# Patient Record
Sex: FEMALE | Race: BLACK OR AFRICAN AMERICAN | Employment: FULL TIME | URBAN - METROPOLITAN AREA
[De-identification: names, ages, dates, MRNs, and addresses within clinical notes are randomized per-mention and may not be internally consistent; named-entity substitution may affect disease eponyms.]

---

## 2017-01-24 PROBLEM — Z12.11 ENCOUNTER FOR HEMOCCULT SCREENING: Status: ACTIVE | Noted: 2017-01-24

## 2017-03-31 RX ORDER — PAROXETINE 10 MG/1
10 TABLET, FILM COATED ORAL DAILY
Qty: 30 TAB | Refills: 3 | Status: SHIPPED | OUTPATIENT
Start: 2017-03-31 | End: 2017-04-28 | Stop reason: SDUPTHER

## 2017-04-28 DIAGNOSIS — N95.1 HOT FLASH, MENOPAUSAL: Primary | ICD-10-CM

## 2017-04-28 RX ORDER — PAROXETINE 10 MG/1
10 TABLET, FILM COATED ORAL DAILY
Qty: 30 TAB | Refills: 6 | Status: SHIPPED | OUTPATIENT
Start: 2017-04-28 | End: 2017-10-30 | Stop reason: SDUPTHER

## 2017-06-30 ENCOUNTER — OFFICE VISIT (OUTPATIENT)
Dept: INTERNAL MEDICINE CLINIC | Age: 57
End: 2017-06-30

## 2017-06-30 VITALS
DIASTOLIC BLOOD PRESSURE: 82 MMHG | HEART RATE: 74 BPM | TEMPERATURE: 96.8 F | OXYGEN SATURATION: 98 % | WEIGHT: 226 LBS | HEIGHT: 66 IN | SYSTOLIC BLOOD PRESSURE: 130 MMHG | BODY MASS INDEX: 36.32 KG/M2 | RESPIRATION RATE: 17 BRPM

## 2017-06-30 DIAGNOSIS — N95.1 HOT FLASH, MENOPAUSAL: ICD-10-CM

## 2017-06-30 DIAGNOSIS — E66.01 MORBID OBESITY DUE TO EXCESS CALORIES (HCC): ICD-10-CM

## 2017-06-30 DIAGNOSIS — I10 ESSENTIAL HYPERTENSION: Primary | ICD-10-CM

## 2017-06-30 NOTE — MR AVS SNAPSHOT
Visit Information Date & Time Provider Department Dept. Phone Encounter #  
 6/30/2017  9:15 AM Kacie Tena [facility] [phone] [id number] Follow-up Instructions Return in about 6 months (around 12/30/2017). Follow-up and Disposition History Your Appointments 12/29/2017  9:15 AM  
Any with Neda Manriquez MD  
[address] [place] [address] and Primary Care [facility]) Appt Note: follow up 6mnths  
 BladeSiobhan Lui [address] 1 [facility] [place]  
  
   
 Oscar Lui 90 [id number] Upcoming Health Maintenance Date Due DTaP/Tdap/Td series (1 - Tdap) 6/1/1982 PAP AKA CERVICAL CYTOLOGY 6/1/1982 BREAST CANCER SCRN MAMMOGRAM 6/1/2011 INFLUENZA AGE 9 TO ADULT 8/1/2017 FOBT Q 1 YEAR AGE 50-75 1/15/2018 Allergies as of 6/30/2017  Review Complete On: 6/30/2017 By: Neda Manriquez MD  
 No Known Allergies Current Immunizations  Never Reviewed No immunizations on file. Not reviewed this visit You Were Diagnosed With   
  
 Codes Comments Essential hypertension    -  Primary ICD-10-CM: I10 
ICD-9-CM: 401.9 Morbid obesity due to excess calories (HCC)     ICD-10-CM: E66.01 
ICD-9-CM: 278.01 Hot flash, menopausal     ICD-10-CM: N95.1 ICD-9-CM: 627.2 Vitals BP Pulse Temp Resp Height(growth percentile) Weight(growth percentile) 130/82 (BP 1 Location: Right arm, BP Patient Position: Sitting) 74 96.8 °F (36 °C) (Oral) 17 5' 6\" (1.676 m) 226 lb (102.5 kg) SpO2 BMI OB Status Smoking Status 98% 36.48 kg/m2 Unknown Never Smoker BMI and BSA Data Body Mass Index Body Surface Area  
 36.48 kg/m 2 2.18 m 2 Preferred Pharmacy Pharmacy Name Phone [id number] [place], [place] - [address] [phone] Your Updated Medication List  
  
   
 This list is accurate as of: 17 10:37 AM.  Always use your most recent med list. amLODIPine 10 mg tablet Commonly known as:  Guy Goyo Take 0.5 Tabs by mouth daily. Diphth, Pertus(Acell), Tetanus 2.5-8-5 Lf-mcg-Lf/0.5mL Syrg Commonly known as:  BOOSTRIX TDAP  
0.5 mL by IntraMUSCular route once for 1 dose.  
  
 hydroCHLOROthiazide 12.5 mg tablet Commonly known as:  HYDRODIURIL Take 1 Tab by mouth daily. PARoxetine 10 mg tablet Commonly known as:  PAXIL Take 1 Tab by mouth daily. Prescriptions Sent to Pharmacy Refills Shirley Flalyce,, Tetanus (BOOSTRIX TDAP) 2.5-8-5 Lf-mcg-Lf/0.5mL syrg 0 Si.5 mL by IntraMUSCular route once for 1 dose. Class: Normal  
 Pharmacy: [facility] [id number] Ph #: [phone] Route: IntraMUSCular Follow-up Instructions Return in about 6 months (around 2017). To-Do List   
 2017 Imaging:  AC MAMMO BI SCREENING INCL CAD Introducing [facility]! Jazmin Titus introduces Med[employer] patient portal. Now you can access parts of your medical record, email your doctor's office, and request medication refills online. 1. In your internet browser, go to [email or web address] 2. Click on the First Time User? Click Here link in the Sign In box. You will see the New Member Sign Up page. 3. Enter your Med[employer] Access Code exactly as it appears below. You will not need to use this code after youve completed the sign-up process. If you do not sign up before the expiration date, you must request a new code. · Med[employer] Access Code: [email or web address] Expires: 2017 10:37 AM 
 
4. Enter the last four digits of your Social Security Number (xxxx) and Date of Birth (mm/dd/yyyy) as indicated and click Submit. You will be taken to the next sign-up page. 5. Create a [employer] ID. This will be your [employer] login ID and cannot be changed, so think of one that is secure and easy to remember. 6. Create a [employer] password. You can change your password at any time. 7. Enter your Password Reset Question and Answer. This can be used at a later time if you forget your password. 8. Enter your e-mail address. You will receive e-mail notification when new information is available in [address] 9. Click Sign Up. You can now view and download portions of your medical record. 10. Click the Download Summary menu link to download a portable copy of your medical information. If you have questions, please visit the Frequently Asked Questions section of the [employer] website. Remember, [employer] is NOT to be used for urgent needs. For medical emergencies, dial 911. Now available from your iPhone and Android! Please provide this summary of care documentation to your next provider. If you have any questions after today's visit, please call [phone].

## 2017-06-30 NOTE — PROGRESS NOTES
1. Have you been to the ER, urgent care clinic since your last visit? Hospitalized since your last visit? No    2. Have you seen or consulted any other health care providers outside of the [address] [place] [address] since your last visit? Include any pap smears or colon screening.  No

## 2017-06-30 NOTE — PROGRESS NOTES
SPORTS MEDICINE AND PRIMARY CARE  Rian Wu MD, [address] [address]  Phone:  [phone]  Fax: [phone]      Chief Complaint   Patient presents with    Follow-up     6 month visit          SUBECTIVE:    Alexander Arriaza is a 65 y.o. female The patient returns today alert, appropriate, ambulatory and has the capacity to give an accurate history. She has a known history of primary hypertension, obesity and degenerative joint disease. The patient returns today voicing no new complaints and tells me she is doing okay and is seen for evaluation. Current Outpatient Prescriptions   Medication Sig Dispense Refill    Diphth, Pertus,Acell,, Tetanus (BOOSTRIX TDAP) 2.5-8-5 Lf-mcg-Lf/0.5mL syrg 0.5 mL by IntraMUSCular route once for 1 dose. 0.5 mL 0    PARoxetine (PAXIL) 10 mg tablet Take 1 Tab by mouth daily. 30 Tab 6    hydroCHLOROthiazide (HYDRODIURIL) 12.5 mg tablet Take 1 Tab by mouth daily. 90 Tab 11    amLODIPine (NORVASC) 10 mg tablet Take 0.5 Tabs by mouth daily. 80 Tab 11     Past Medical History:   Diagnosis Date    Arthritis     Encounter for Hemoccult screening 01/24/2017    neg    Hot flash, menopausal 2015    Hypertension 2000    Obesity 2000    Prediabetes 12/29/17    [place]     Past Surgical History:   Procedure Laterality Date    HX GYN      HX ORTHOPAEDIC       No Known Allergies    REVIEW OF SYSTEMS:   No chest pain, no shortness of breath. Social History     Social History    Marital status:      Spouse name: N/A    Number of children: N/A    Years of education: N/A     Social History Main Topics    Smoking status: Never Smoker    Smokeless tobacco: Never Used    Alcohol use No    Drug use: None    Sexual activity: Not Asked     Other Topics Concern    None     Social History Narrative    Habits: There is no history of smoking, alcohol abuse or drug abuse.           Social History:   The patient is  and lives with her . The patient is the mother of 29 25and 39year-old children and three grandchildren. 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 Her Taoist background is Buddhism.          Family History:  Father  at 79 of a sickness. Mother  of complications of alcoholism. She has eight siblings. r  No family history on file. OBJECTIVE:  Visit Vitals    /82 (BP 1 Location: Right arm, BP Patient Position: Sitting)    Pulse 74    Temp 96.8 °F (36 °C) (Oral)    Resp 17    Ht 5' 6\" (1.676 m)    Wt 226 lb (102.5 kg)    SpO2 98%    BMI 36.48 kg/m2     ENT: perrla,  eom intact  NECK: supple. Thyroid normal  CHEST: clear to ascultation and percussion   HEART: regular rate and rhythm  ABD: soft, bowel sounds active  EXTREMITIES: no edema, pulse 1+     No visits with results within 3 Month(s) from this visit. Latest known visit with results is:    Office Visit on 2016   Component Date Value Ref Range Status    Specific Gravity 2016 1.013  1.005 - 1.030 Final    pH (UA) 2016 6.5  5.0 - 7.5 Final    Color 2016 Yellow  Yellow Final    Appearance 2016 Clear  Clear Final    Leukocyte Esterase 2016 Negative  Negative Final    Protein 2016 Negative  Negative/Trace Final    Glucose 2016 Negative  Negative Final    Ketone 2016 Negative  Negative Final    Blood 2016 Negative  Negative Final    Bilirubin 2016 Negative  Negative Final    Urobilinogen 2016 0.2  0.2 - 1.0 mg/dL Final    Nitrites 2016 Negative  Negative Final    Microscopic Examination 2016 Comment   Final    Microscopic not indicated and not performed.     WBC 2016 4.7  3.4 - 10.8 x10E3/uL Final    RBC 2016 5.22  3.77 - 5.28 x10E6/uL Final    HGB 2016 13.5  11.1 - 15.9 g/dL Final    HCT 2016 41.9  34.0 - 46.6 % Final    MCV 2016 80  79 - 97 fL Final    MCH 12/30/2016 25.9* 26.6 - 33.0 pg Final    MCHC 12/30/2016 32.2  31.5 - 35.7 g/dL Final    RDW 12/30/2016 14.8  12.3 - 15.4 % Final    PLATELET 09/56/0980 908  150 - 379 x10E3/uL Final    NEUTROPHILS 12/30/2016 44  % Final    Lymphocytes 12/30/2016 41  % Final    MONOCYTES 12/30/2016 9  % Final    EOSINOPHILS 12/30/2016 5  % Final    BASOPHILS 12/30/2016 1  % Final    ABS. NEUTROPHILS 12/30/2016 2.1  1.4 - 7.0 x10E3/uL Final    Abs Lymphocytes 12/30/2016 1.9  0.7 - 3.1 x10E3/uL Final    ABS. MONOCYTES 12/30/2016 0.4  0.1 - 0.9 x10E3/uL Final    ABS. EOSINOPHILS 12/30/2016 0.2  0.0 - 0.4 x10E3/uL Final    ABS. BASOPHILS 12/30/2016 0.0  0.0 - 0.2 x10E3/uL Final    IMMATURE GRANULOCYTES 12/30/2016 0  % Final    ABS. IMM. GRANS. 12/30/2016 0.0  0.0 - 0.1 x10E3/uL Final    Glucose 12/30/2016 79  65 - 99 mg/dL Final    BUN 12/30/2016 13  6 - 24 mg/dL Final    Creatinine 12/30/2016 0.81  0.57 - 1.00 mg/dL Final    GFR est non-AA 12/30/2016 82  >59 mL/min/1.73 Final    GFR est AA 12/30/2016 95  >59 mL/min/1.73 Final    BUN/Creatinine ratio 12/30/2016 16  9 - 23 Final    Sodium 12/30/2016 144  134 - 144 mmol/L Final    Potassium 12/30/2016 4.4  3.5 - 5.2 mmol/L Final    Chloride 12/30/2016 104  96 - 106 mmol/L Final    CO2 12/30/2016 24  18 - 29 mmol/L Final    Calcium 12/30/2016 9.9  8.7 - 10.2 mg/dL Final    Protein, total 12/30/2016 7.4  6.0 - 8.5 g/dL Final    Albumin 12/30/2016 4.4  3.5 - 5.5 g/dL Final    GLOBULIN, TOTAL 12/30/2016 3.0  1.5 - 4.5 g/dL Final    A-G Ratio 12/30/2016 1.5  1.1 - 2.5 Final    Bilirubin, total 12/30/2016 0.3  0.0 - 1.2 mg/dL Final    Alk.  phosphatase 12/30/2016 61  39 - 117 IU/L Final    AST (SGOT) 12/30/2016 11  0 - 40 IU/L Final    ALT (SGPT) 12/30/2016 9  0 - 32 IU/L Final    Cholesterol, total 12/30/2016 152  100 - 199 mg/dL Final    Triglyceride 12/30/2016 83  0 - 149 mg/dL Final    HDL Cholesterol 12/30/2016 60  >39 mg/dL Final    VLDL, calculated 12/30/2016 17  5 - 40 mg/dL Final    LDL, calculated 12/30/2016 75  0 - 99 mg/dL Final    TSH 12/30/2016 3.940  0.450 - 4.500 uIU/mL Final    Hemoglobin A1c 12/30/2016 5.9* 4.8 - 5.6 % Final    Comment:          Pre-diabetes: 5.7 - 6.4           Diabetes: >6.4           Glycemic control for adults with diabetes: <7.0      Estimated average glucose 12/30/2016 123  mg/dL Final    Hepatitis A Ab, IgM 12/30/2016 Negative  Negative Final    Hep B surface Ag screen 12/30/2016 Negative  Negative Final    Hep B Core Ab, IgM 12/30/2016 Negative  Negative Final    Hep C Virus Ab 12/30/2016 0.1  0.0 - 0.9 s/co ratio Final    Comment:                                   Negative:     < 0.8                               Indeterminate: 0.8 - 0.9                                    Positive:     > 0.9   The CDC recommends that a positive HCV antibody result   be followed up with a HCV Nucleic Acid Amplification   test (796856).  Occult Blood fecal, by [place] 01/15/2017 Negative  Negative Final          ASSESSMENT:  1. Essential hypertension    2. Morbid obesity due to excess calories ([address]    3. Hot flash, menopausal      The patient's blood pressure control is excellent and it is at goal.      She has a considerable amount of stress. Her  is in the hospital in [place] with complications of congestive heart failure. He had been seen by us and referred to a cardiologist.  Unfortunately, his condition deteriorated. She is concerned about his health. We encouraged diuretic with potassium supplement and orange juice and banana, which she will start doing. She will be due for her laboratory studies in 12/2017. We will see her at that time unless she has a problem before then and we could see her sooner. On her last set of laboratory studies, she was found to have prediabetes. We encouraged physical activity for 30 minutes five days a week and a heart healthy reducing diet. PLAN:  .  Orders Placed This Encounter    AC MAMMO BI SCREENING INCL CAD    Diphth, [address] Tetanus ([address] TDAP) 2.5-8-5 Lf-mcg-Lf/0.5mL syrg       Follow-up Disposition:  Return in about 6 months (around 12/30/2017). ATTENTION:   This medical record was transcribed using an electronic medical records system. Although proofread, it may and can contain electronic and spelling errors. Other human spelling and other errors may be present. Corrections may be executed at a later time. Please feel free to contact us for any clarifications as needed.

## 2017-07-08 ENCOUNTER — HOSPITAL ENCOUNTER (OUTPATIENT)
Dept: MAMMOGRAPHY | Age: 57
Discharge: HOME | End: 2017-07-08
Attending: INTERNAL MEDICINE
Payer: COMMERCIAL

## 2017-07-08 DIAGNOSIS — Z12.31 VISIT FOR SCREENING MAMMOGRAM: ICD-10-CM

## 2017-07-08 PROCEDURE — 77067 SCR MAMMO BI INCL CAD: CPT

## 2017-10-30 RX ORDER — PAROXETINE 10 MG/1
TABLET, FILM COATED ORAL
Qty: 30 TAB | Refills: 3 | Status: SHIPPED | OUTPATIENT
Start: 2017-10-30 | End: 2017-12-29 | Stop reason: SDUPTHER

## 2017-12-29 ENCOUNTER — OFFICE VISIT (OUTPATIENT)
Dept: INTERNAL MEDICINE CLINIC | Age: 57
End: 2017-12-29

## 2017-12-29 VITALS
DIASTOLIC BLOOD PRESSURE: 80 MMHG | WEIGHT: 234.1 LBS | RESPIRATION RATE: 18 BRPM | SYSTOLIC BLOOD PRESSURE: 127 MMHG | HEIGHT: 66 IN | TEMPERATURE: 97.8 F | OXYGEN SATURATION: 97 % | BODY MASS INDEX: 37.62 KG/M2 | HEART RATE: 83 BPM

## 2017-12-29 DIAGNOSIS — R73.03 PREDIABETES: ICD-10-CM

## 2017-12-29 DIAGNOSIS — Z23 ENCOUNTER FOR IMMUNIZATION: Primary | ICD-10-CM

## 2017-12-29 DIAGNOSIS — E04.1 THYROID NODULE: ICD-10-CM

## 2017-12-29 DIAGNOSIS — N95.1 HOT FLASH, MENOPAUSAL: ICD-10-CM

## 2017-12-29 DIAGNOSIS — E66.09 CLASS 2 OBESITY DUE TO EXCESS CALORIES WITHOUT SERIOUS COMORBIDITY WITH BODY MASS INDEX (BMI) OF 37.0 TO 37.9 IN ADULT: ICD-10-CM

## 2017-12-29 DIAGNOSIS — I10 ESSENTIAL HYPERTENSION: ICD-10-CM

## 2017-12-29 RX ORDER — PAROXETINE HYDROCHLORIDE 20 MG/1
20 TABLET, FILM COATED ORAL DAILY
Qty: 30 TAB | Refills: 11 | Status: SHIPPED | OUTPATIENT
Start: 2017-12-29 | End: 2018-03-30 | Stop reason: SDUPTHER

## 2017-12-29 NOTE — PROGRESS NOTES
The Paxil is no longer effective for her hot flashes. Around September she started having hot flashes more than previously noted. She wonders what else can be done. We suggest increasing Paxil to 20 mg daily to see if that helps and also suggest soy silk and black cohosh. We are very disappointed in her scales today. She obviously has had a wonderful Thanksgiving and [place]etu with a 12 pound weight gain since we last saw her. She tells me she is going to do better, so after the first of the year encouraged physical activity for 30 minutes five days a week and a heart healthy, weight reducing diet. Appropriate laboratory studies have been requested and will be sent to her in the mail. Six months from now we expect to see a significant improvement in her weight.

## 2017-12-29 NOTE — PROGRESS NOTES
SPORTS MEDICINE AND PRIMARY CARE  Oskar Rivera MD, [address] [address]  Phone:  [phone]  Fax: [phone]       Chief Complaint   Patient presents with    Hypertension     6 month f/u   . SUBJECTIVE:    Rohit Mendieta is a 65 y.o. female  Dictation on: 12/29/2017 10:28 AM by: Zeke Flores [[id number]]            Current Outpatient Prescriptions   Medication Sig Dispense Refill    PARoxetine (PAXIL) 20 mg tablet Take 1 Tab by mouth daily. 30 Tab 11    black cohosh 20 mg tab Take 40 mg by mouth daily. 30 Tab 11    hydroCHLOROthiazide (HYDRODIURIL) 12.5 mg tablet Take 1 Tab by mouth daily. 90 Tab 11    amLODIPine (NORVASC) 10 mg tablet Take 0.5 Tabs by mouth daily.  80 Tab 11     Past Medical History:   Diagnosis Date    Arthritis     Encounter for Hemoccult screening 01/24/2017    neg    Hot flash, menopausal 2015    Hypertension 2000    Obesity 2000    Prediabetes 12/29/17    [place]     Past Surgical History:   Procedure Laterality Date    HX GYN      HX ORTHOPAEDIC       No Known Allergies      REVIEW OF SYSTEMS:  General: negative for - chills or fever  ENT: negative for - headaches, nasal congestion or tinnitus  Respiratory: negative for - cough, hemoptysis, shortness of breath or wheezing  Cardiovascular : negative for - chest pain, edema, palpitations or shortness of breath  Gastrointestinal: negative for - abdominal pain, blood in stools, heartburn or nausea/vomiting  Genito-Urinary: no dysuria, trouble voiding, or hematuria  Musculoskeletal: negative for - gait disturbance, joint pain, joint stiffness or joint swelling  Neurological: no TIA or stroke symptoms  Hematologic: no bruises, no bleeding, no swollen glands  Integument: no lumps, mole changes, nail changes or rash  Endocrine: no malaise/lethargy or unexpected weight changes      Social History     Social History    Marital status:      Spouse name: N/A    Number of children: N/A    Years of education: N/A     Social History Main Topics    Smoking status: Never Smoker    Smokeless tobacco: Never Used    Alcohol use No    Drug use: No    Sexual activity: Yes     Partners: Male     Other Topics Concern    None     Social History Narrative    Habits: There is no history of smoking, alcohol abuse or drug abuse.           Social History:  The patient is  and lives with her . The patient is the mother of 29 25and 39year-old children and three grandchildren. 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 Her Alevism background is Hindu.          Family History:  Father  at 79 of a sickness. Mother  of complications of alcoholism. She has eight siblings. History reviewed. No pertinent family history. OBJECTIVE:    Visit Vitals    /80    Pulse 83    Temp 97.8 °F (36.6 °C) (Oral)    Resp 18    Ht 5' 6\" (1.676 m)    Wt 234 lb 1.6 oz (106.2 kg)    SpO2 97%    BMI 37.78 kg/m2     CONSTITUTIONAL: well , well nourished, appears age appropriate  EYES: perrla, eom intact  ENMT:moist mucous membranes, pharynx clear  NECK: supple. Thyroid normal  RESPIRATORY: Chest: clear bilaterally   CARDIOVASCULAR: Heart: regular rate and rhythm  GASTROINTESTINAL: Abdomen: soft, bowel sounds active  HEMATOLOGIC: no pathological lymph nodes palpated  MUSCULOSKELETAL: Extremities: no edema, pulse 1+   INTEGUMENT: No unusual rashes or suspicious skin lesions noted. Nails appear normal.  NEUROLOGIC: non-focal exam   MENTAL STATUS: alert and oriented, appropriate affect           ASSESSMENT:  1. Encounter for immunization    2. Essential hypertension    3. Prediabetes    4. Class 2 obesity due to excess calories without serious comorbidity with body mass index (BMI) of 37.0 to 37.9 in adult    5.  Hot flash, menopausal       Dictation on: 2017 10:30 AM by: Leesa Ceballos [[id number]] PLAN:  .  Orders Placed This Encounter    Influenza virus vaccine (QUADRIVALENT PRES FREE SYRINGE) IM ([id number])    URINALYSIS W/ RFLX MICROSCOPIC    CBC WITH AUTOMATED DIFF    METABOLIC PANEL, COMPREHENSIVE    LIPID PANEL    TSH 3RD GENERATION    HEMOGLOBIN A1C WITH EAG    PARoxetine (PAXIL) 20 mg tablet    black cohosh 20 mg tab       Follow-up Disposition:  Return in about 6 months (around 6/29/2018). ATTENTION:   This medical record was transcribed using an electronic medical records system. Although proofread, it may and can contain electronic and spelling errors. Other human spelling and other errors may be present. Corrections may be executed at a later time. Please feel free to contact us for any clarifications as needed.

## 2017-12-29 NOTE — PROGRESS NOTES
1. Have you been to the ER, urgent care clinic since your last visit? Hospitalized since your last visit? No    2. Have you seen or consulted any other health care providers outside of the [address] [place] [address] since your last visit? Include any pap smears or colon screening.  No

## 2017-12-29 NOTE — PROGRESS NOTES
Patient with history of hypertension, obesity, degenerative joint disease, and is seen for evaluation ***. Patient returns today voicing no new complaints. Her only concern is that related to her knees. Patient is seen for evaluation.

## 2017-12-29 NOTE — MR AVS SNAPSHOT
Visit Information Date & Time Provider Department Dept. Phone Encounter #  
 12/29/2017  9:15 AM Kacie Patel [facility] [phone] [id number] Follow-up Instructions Return in about 6 months (around 6/29/2018). Follow-up and Disposition History Upcoming Health Maintenance Date Due  
 PAP AKA CERVICAL CYTOLOGY 6/1/1982 Influenza Age 5 to Adult 8/1/2017 FOBT Q 1 YEAR AGE 50-75 1/15/2018 DTaP/Tdap/Td series (2 - Td) 12/29/2027 Allergies as of 12/29/2017  Review Complete On: 12/29/2017 By: Lia Vazquez MD  
 No Known Allergies Current Immunizations  Never Reviewed Name Date Influenza Vaccine (Quad) PF 12/29/2017 Not reviewed this visit You Were Diagnosed With   
  
 Codes Comments Encounter for immunization    -  Primary ICD-10-CM: Z93 ICD-9-CM: V03.89 Essential hypertension     ICD-10-CM: I10 
ICD-9-CM: 401.9 Prediabetes     ICD-10-CM: R73.03 
ICD-9-CM: 790.29 Class 2 obesity due to excess calories without serious comorbidity with body mass index (BMI) of 37.0 to 37.9 in adult     ICD-10-CM: E66.09, Q44.57 ICD-9-CM: 278.00, V85.37 Hot flash, menopausal     ICD-10-CM: N95.1 ICD-9-CM: 627.2 Vitals BP Pulse Temp Resp Height(growth percentile) Weight(growth percentile) 127/80 83 97.8 °F (36.6 °C) (Oral) 18 5' 6\" (1.676 m) 234 lb 1.6 oz (106.2 kg) SpO2 BMI OB Status Smoking Status 97% 37.78 kg/m2 Menopause Never Smoker Vitals History BMI and BSA Data Body Mass Index Body Surface Area 37.78 kg/m 2 2.22 m 2 Preferred Pharmacy Pharmacy Name Phone [id number], [place] - [address] [phone] Your Updated Medication List  
  
   
This list is accurate as of: 12/29/17 10:50 AM.  Always use your most recent med list. amLODIPine 10 mg tablet Commonly known as:  Rivero Fanti Take 0.5 Tabs by mouth daily. black cohosh 20 mg Tab Take 40 mg by mouth daily. hydroCHLOROthiazide 12.5 mg tablet Commonly known as:  HYDRODIURIL Take 1 Tab by mouth daily. PARoxetine 20 mg tablet Commonly known as:  PAXIL Take 1 Tab by mouth daily. Prescriptions Sent to Pharmacy Refills PARoxetine (PAXIL) 20 mg tablet 11 Sig: Take 1 Tab by mouth daily. Class: Normal  
 Pharmacy: [facility] [address] [place] [address] [address] Ph #: [phone] Route: Oral  
 black cohosh 20 mg tab 11 Sig: Take 40 mg by mouth daily. Class: Normal  
 Pharmacy: [facility]/pharmacy [address] [place] [address] S[address] Ph #: [phone] Route: Oral  
  
We Performed the Following CBC WITH AUTOMATED DIFF [84593 CPT(R)] HEMOGLOBIN A1C WITH EAG [32005 CPT(R)] INFLUENZA VIRUS VAC QUAD,SPLIT,PRESV FREE SYRINGE IM [id number] CPT(R)] LIPID PANEL [89525 CPT(R)] METABOLIC PANEL, COMPREHENSIVE [42712 CPT(R)] KY COLLECTION VENOUS BLOOD,VENIPUNCTURE [id number] CPT(R)] KY IMMUNIZ ADMIN,1 SINGLE/COMB VAC/TOXOID [id number] CPT(R)] TSH 3RD GENERATION [08125 CPT(R)] URINALYSIS W/ RFLX MICROSCOPIC [50772 CPT(R)] Follow-up Instructions Return in about 6 months (around 6/29/2018). Patient Instructions Vaccine Information Statement Influenza (Flu) Vaccine (Inactivated or Recombinant): What you need to know Many Vaccine Information Statements are available in Somali and other languages. See www.immunize.org/vis Hojas de Información Sobre Vacunas están disponibles en Español y en muchos otros idiomas. Visite www.immunize.org/vis 1. Why get vaccinated? Influenza (flu) is a contagious disease that spreads around the [place] every year, usually between October and May. Flu is caused by influenza viruses, and is spread mainly by coughing, sneezing, and close contact. Anyone can get flu. Flu strikes suddenly and can last several days. Symptoms vary by age, but can include: 
 fever/chills  sore throat  muscle aches  fatigue  cough  headache  runny or stuffy nose Flu can also lead to pneumonia and blood infections, and cause diarrhea and seizures in children. If you have a medical condition, such as heart or lung disease, flu can make it worse. Flu is more dangerous for some people. Infants and young children, people 72years of age and older, pregnant women, and people with certain health conditions or a weakened immune system are at greatest risk. Each year thousands of people in the [facility] die from flu, and many more are hospitalized. Flu vaccine can: 
 keep you from getting flu, 
 make flu less severe if you do get it, and 
 keep you from spreading flu to your family and other people. 2. Inactivated and recombinant flu vaccines A dose of flu vaccine is recommended every flu season. Children 6 months through 6years of age may need two doses during the same flu season. Everyone else needs only one dose each flu season. Some inactivated flu vaccines contain a very small amount of a mercury-based preservative called thimerosal. Studies have not shown thimerosal in vaccines to be harmful, but flu vaccines that do not contain thimerosal are available. There is no live flu virus in flu shots. They cannot cause the flu. There are many flu viruses, and they are always changing. Each year a new flu vaccine is made to protect against three or four viruses that are likely to cause disease in the upcoming flu season. But even when the vaccine doesnt exactly match these viruses, it may still provide some protection Flu vaccine cannot prevent: 
 flu that is caused by a virus not covered by the vaccine, or 
  illnesses that look like flu but are not. It takes about 2 weeks for protection to develop after vaccination, and protection lasts through the flu season. 3. Some people should not get this vaccine Tell the person who is giving you the vaccine:  If you have any severe, life-threatening allergies. If you ever had a life-threatening allergic reaction after a dose of flu vaccine, or have a severe allergy to any part of this vaccine, you may be advised not to get vaccinated. Most, but not all, types of flu vaccine contain a small amount of egg protein.  If you ever had Guillain-Barré Syndrome (also called GBS). Some people with a history of GBS should not get this vaccine. This should be discussed with your doctor.  If you are not feeling well. It is usually okay to get flu vaccine when you have a mild illness, but you might be asked to come back when you feel better. 4. Risks of a vaccine reaction With any medicine, including vaccines, there is a chance of reactions. These are usually mild and go away on their own, but serious reactions are also possible. Most people who get a flu shot do not have any problems with it. Minor problems following a flu shot include:  
 soreness, redness, or swelling where the shot was given  hoarseness  sore, red or itchy eyes  cough  fever  aches  headache  itching  fatigue If these problems occur, they usually begin soon after the shot and last 1 or 2 days. More serious problems following a flu shot can include the following:  There may be a small increased risk of Guillain-Barré Syndrome (GBS) after inactivated flu vaccine. This risk has been estimated at 1 or 2 additional cases per million people vaccinated. This is much lower than the risk of severe complications from flu, which can be prevented by flu vaccine.    
 
 Young children who get the flu shot along with pneumococcal vaccine (PCV13) and/or DTaP vaccine at the same time might be slightly more likely to have a seizure caused by fever. Ask your doctor for more information. Tell your doctor if a child who is getting flu vaccine has ever had a seizure. Problems that could happen after any injected vaccine:  People sometimes faint after a medical procedure, including vaccination. Sitting or lying down for about 15 minutes can help prevent fainting, and injuries caused by a fall. Tell your doctor if you feel dizzy, or have vision changes or ringing in the ears.  Some people get severe pain in the shoulder and have difficulty moving the arm where a shot was given. This happens very rarely.  Any medication can cause a severe allergic reaction. Such reactions from a vaccine are very rare, estimated at about 1 in a million doses, and would happen within a few minutes to a few hours after the vaccination. As with any medicine, there is a very remote chance of a vaccine causing a serious injury or death. The safety of vaccines is always being monitored. For more information, visit: www.cdc.gov/vaccinesafety/ 
 
 
The [facility] Vaccine Injury Compensation Program (VICP) is a federal program that was created to compensate people who may have been injured by certain vaccines. Persons who believe they may have been injured by a vaccine can learn about the program and about filing a claim by calling 9-299.450.5602 or visiting the [employer] website at www.[facility].gov/vaccinecompensation. There is a time limit to file a claim for compensation. 7. How can I learn more?  Ask your healthcare provider. He or she can give you the vaccine package insert or suggest other sources of information.  Call your local or state health department.  Contact the Centers for Disease Control and Prevention (CDC): 
- Call 5-817.266.1009 (1-800-CDC-INFO) or 
- Visit CDCs website at www.cdc.gov/flu Vaccine Information Statement Inactivated Influenza Vaccine 8/7/2015 
42 CARLOSSiobhan OmerAdam Edda 892DE-41 Department of Health and [employer] Centers for Disease Control and Prevention Office Use Only Menopause Diet: Care Instructions Your Care Instructions Healthy eating helps ease menopause symptoms. And it can reduce your risk for getting conditions such as osteoporosis and heart disease. Follow-up care is a key part of your treatment and safety. Be sure to make and go to all appointments, and call your doctor if you are having problems. It's also a good idea to know your test results and keep a list of the medicines you take. How can you care for yourself at home? · Limit fats in your diet. · Choose foods that have a lot of calcium. The recommended daily intake for adults ages 23 to 48 is 1,000 milligrams (mg). Adults over 50 need 1,200 mg a day. Take a calcium supplement if you don't get enough calcium in the foods you eat. · Add vitamin D to your daily diet. It helps your body use calcium.  The recommended daily intake of vitamin D is 600 international units (IU) a day for children and adults up to age 79. Adults age 70 and older need 800 IU a day. Take vitamin D supplements if you need to. · Include good sources of fiber in your diet each day. These include whole grains, beans, fruits, and vegetables. · Avoid simple sugars. This helps if you have mood swings, anxiety, or depression. · Avoid caffeine, or cut back on it. Caffeine can cause sleep problems. It can also make you feel anxious. To relieve these symptoms, pay attention to how much caffeine you are getting in drinks and chocolate. · Limit your intake of alcohol. Heavy drinking tends to make symptoms worse. Where can you learn more? Go to http://rafaela-joss.info/. Enter Q511 in the search box to learn more about \"Menopause Diet: Care Instructions. \" Current as of: May 12, 2017 Content Version: 11.4 © 4684-3344 [employer]. Care instructions adapted under license by [employer] (which disclaims liability or warranty for this information). If you have questions about a medical condition or this instruction, always ask your healthcare professional. [place] [address] any warranty or liability for your use of this information. Learning About Menopause What is menopause? For most women, menopause is a natural process of aging. Menstrual periods gradually stop. The ability to become pregnant ends. Some women feel relief that their childbearing years are ending. But other women struggle with the physical and emotional changes that come with menopause. For most women, menopause happens around age 48. But every woman's body has its own timeline. Some women stop having periods in their mid-40s. Others keep having periods well into their 50s. And some women go through menopause early because of cancer treatment or surgery to remove the ovaries. What can you expect with menopause? · It starts with perimenopause. This is the process of change that leads up to menopause. Perimenopause can start as early as your late 35s or as late as your early 46s. How long it lasts varies. But it usually lasts from 2 to 8 years. · During this time, your hormone levels will go up and down unevenly (fluctuate). This causes changes in your periods and other symptoms. In time, estrogen and progesterone levels drop enough that the menstrual cycle stops. Going a full year without having a period is usually considered menopause. · Low estrogen levels after menopause speed bone loss. This increases your risk of osteoporosis. Also, your risk of heart disease increases after menopause. · It's normal to have thinner, dryer, wrinkled skin after menopause. The vaginal lining and the lower urinary tract also thin and weaken. This can make it hard to have sex. It can also increase the risk of vaginal and urinary tract infections. What are the symptoms? · Lighter or heavier periods. Your menstrual cycle may be longer or shorter. You may skip periods. · Hot flashes. You may have a sudden feeling of intense body heat. You may sweat, and your head, neck, and chest may get red. Along with hot flashes, you may have a heartbeat that's too fast or not regular. You may also feel anxious or grouchy. In rare cases you might feel panic. · Trouble sleeping. · Mood swings or feeling grouchy, depressed, or worried. · Problems with remembering or thinking clearly. · Vaginal dryness. Some women have only a few mild symptoms. Others have severe symptoms that disrupt their sleep and daily lives. Symptoms tend to last or get worse the first year or more after menopause. Over time, hormones even out at low levels. Many symptoms improve or go away. But some women may have symptoms that don't go away. How are menopause symptoms treated? ?If you have mild symptoms, you may get some relief if you eat healthy foods, exercise, and lower your stress. Some women choose to take medicines if they have severe symptoms that aren't helped by making changes to their lifestyle. ? Lifestyle changes ? · Choose a heart-healthy diet that is low in saturated fat. It should include plenty of fish, fruits, vegetables, beans, and high-fiber grains and breads. Be sure you get enough calcium and vitamin D to help your bones stay strong. Low-fat or nonfat dairy products are a great source of calcium. ? · Get regular exercise. Exercise can help you manage your weight, keep your heart and bones strong, and lift your mood. ? · Limit caffeine, alcohol, and stress. These things can make symptoms worse. Limiting them may help you sleep better. ? · If you smoke, stop. Quitting smoking can reduce hot flashes and long-term health risks. Medicines ? If your symptoms are severe, talk with your doctor. You may want to try prescription medicines, such as: 
? · Low-dose birth control pills before menopause. ? · Low-dose hormone therapy (HT) after menopause. ? · Antidepressants. ? · A medicine called clonidine (Catapres) that is usually used to treat high blood pressure. ? All medicines for menopause symptoms have possible risks or side effects. A very small number of women develop serious health problems when taking hormone therapy. Be sure to talk to your doctor about your possible health risks before you start a treatment for menopause symptoms. ?Other treatments ? You can try: 
? · Breathing exercises. They may help reduce hot flashes and emotional symptoms. ? · Soy. Some women feel that eating lots of soy helps even out their menopause symptoms. ? · Yoga or biofeedback. They may help reduce stress. Follow-up care is a key part of your treatment and safety.  Be sure to make and go to all appointments, and call your doctor if you are having problems. It's also a good idea to know your test results and keep a list of the medicines you take. Where can you learn more? Go to http://rafaela-joss.info/. Enter H199 in the search box to learn more about \"Learning About Menopause. \" Current as of: October 13, 2016 Content Version: 11.4 © 5121-0977 [employer]. Care instructions adapted under license by [employer] (which disclaims liability or warranty for this information). If you have questions about a medical condition or this instruction, always ask your healthcare professional. [place] [address] any warranty or liability for your use of this information. Hot Flashes During Menopause: Care Instructions Your Care Instructions A hot flash is a sudden feeling of intense body heat. Your head, neck, and chest may get red. Your heartbeat may speed up, and you may feel anxious or irritable. You may find that hot flashes occur more often in warm rooms or during stressful times. Hot flashes and other symptoms are a normal response to the hormone changes that occur before your menstrual cycle goes away completely (menopause). Hot flashes often get better and go away with time. Making a few changes, such as exercising more, practicing meditation, quitting smoking, and drinking less alcohol, can help. Some women take hormone pills or other medicine to treat bothersome symptoms. Follow-up care is a key part of your treatment and safety. Be sure to make and go to all appointments, and call your doctor if you are having problems. It's also a good idea to know your test results and keep a list of the medicines you take. How can you care for yourself at home? · If you decide to take medicine to treat hot flashes, take it exactly as prescribed. Call your doctor if you think you are having a problem with your medicine. You will get more details on the specific medicine your doctor prescribes. · Learn to meditate. Sit quietly and focus on your breathing. Try to practice each day. Books, classes, and tapes can help you start a program. 
· Wear natural fabrics, such as cotton and silk. Dress in layers so you can take off clothes as needed. · Keep the room temperature cool, or use a fan. You are more likely to have a hot flash when you are too warm than when you are cool. · Use fewer blankets when you sleep at night. · Drink cold fluids rather than hot ones. Limit your intake of caffeine and alcohol. · Eat smaller meals more often during the day so your body makes less heat than when digesting large amounts of food. Eat low-fat and high-fiber foods. · Do not smoke. Smoking can make hot flashes worse. If you need help quitting, talk to your doctor about stop-smoking programs and medicines. These can increase your chances of quitting for good. · Get at least 30 minutes of exercise on most days of the week. Walking is a good choice. You also may want to do other activities, such as running, swimming, cycling, or playing tennis or team sports. Where can you learn more? Go to http://rafaela-joss.info/. Enter F700 in the search box to learn more about \"Hot Flashes During Menopause: Care Instructions. \" Current as of: October 13, 2016 Content Version: 11.4 © 5371-2926 Healthwise[employer]. Care instructions adapted under license by [employer] (which disclaims liability or warranty for this information). If you have questions about a medical condition or this instruction, always ask your healthcare professional. [place] [address] any warranty or liability for your use of this information. Patient Instructions History Introducing [facility]! Romayne Duster introduces [employer] patient portal. Now you can access parts of your medical record, email your doctor's office, and request medication refills online.    
 
1. In your internet browser, go to [email or web address] 2. Click on the First Time User? Click Here link in the Sign In box. You will see the New Member Sign Up page. 3. Enter your [email or web address] Access Code exactly as it appears below. You will not need to use this code after youve completed the sign-up process. If you do not sign up before the expiration date, you must request a new code. · [email or web address] Access Code: [email or web address] Expires: 3/29/2018 10:50 AM 
 
4. Enter the last four digits of your Social Security Number (xxxx) and Date of Birth (mm/dd/yyyy) as indicated and click Submit. You will be taken to the next sign-up page. 5. Create a [email or web address] ID. This will be your [email or web address] login ID and cannot be changed, so think of one that is secure and easy to remember. 6. Create a [email or web address] password. You can change your password at any time. 7. Enter your Password Reset Question and Answer. This can be used at a later time if you forget your password. 8. Enter your e-mail address. You will receive e-mail notification when new information is available in [address] 9. Click Sign Up. You can now view and download portions of your medical record. 10. Click the Download Summary menu link to download a portable copy of your medical information. If you have questions, please visit the Frequently Asked Questions section of the [email or web address] website. Remember, [email or web address] is NOT to be used for urgent needs. For medical emergencies, dial 911. Now available from your iPhone and Android! Please provide this summary of care documentation to your next provider. Your primary care clinician is listed as Cuauhtemoc Young. If you have any questions after today's visit, please call [phone].

## 2017-12-29 NOTE — PATIENT INSTRUCTIONS
Vaccine Information Statement    Influenza (Flu) Vaccine (Inactivated or Recombinant): What you need to know    Many Vaccine Information Statements are available in Turkmen and other languages. See www.immunize.org/vis  Hojas de Información Sobre Vacunas están disponibles en Español y en muchos otros idiomas. Visite www.immunize.org/vis    1. Why get vaccinated? Influenza (flu) is a contagious disease that spreads around the [place] every year, usually between October and May. Flu is caused by influenza viruses, and is spread mainly by coughing, sneezing, and close contact. Anyone can get flu. Flu strikes suddenly and can last several days. Symptoms vary by age, but can include:   fever/chills   sore throat   muscle aches   fatigue   cough   headache    runny or stuffy nose    Flu can also lead to pneumonia and blood infections, and cause diarrhea and seizures in children. If you have a medical condition, such as heart or lung disease, flu can make it worse. Flu is more dangerous for some people. Infants and young children, people 72years of age and older, pregnant women, and people with certain health conditions or a weakened immune system are at greatest risk. Each year thousands of people in the [facility] die from flu, and many more are hospitalized. Flu vaccine can:   keep you from getting flu,   make flu less severe if you do get it, and   keep you from spreading flu to your family and other people. 2. Inactivated and recombinant flu vaccines    A dose of flu vaccine is recommended every flu season. Children 6 months through 6years of age may need two doses during the same flu season. Everyone else needs only one dose each flu season.        Some inactivated flu vaccines contain a very small amount of a mercury-based preservative called thimerosal. Studies have not shown thimerosal in vaccines to be harmful, but flu vaccines that do not contain thimerosal are available. There is no live flu virus in flu shots. They cannot cause the flu. There are many flu viruses, and they are always changing. Each year a new flu vaccine is made to protect against three or four viruses that are likely to cause disease in the upcoming flu season. But even when the vaccine doesnt exactly match these viruses, it may still provide some protection    Flu vaccine cannot prevent:   flu that is caused by a virus not covered by the vaccine, or   illnesses that look like flu but are not. It takes about 2 weeks for protection to develop after vaccination, and protection lasts through the flu season. 3. Some people should not get this vaccine    Tell the person who is giving you the vaccine:     If you have any severe, life-threatening allergies. If you ever had a life-threatening allergic reaction after a dose of flu vaccine, or have a severe allergy to any part of this vaccine, you may be advised not to get vaccinated. Most, but not all, types of flu vaccine contain a small amount of egg protein.  If you ever had Guillain-Barré Syndrome (also called GBS). Some people with a history of GBS should not get this vaccine. This should be discussed with your doctor.  If you are not feeling well. It is usually okay to get flu vaccine when you have a mild illness, but you might be asked to come back when you feel better. 4. Risks of a vaccine reaction    With any medicine, including vaccines, there is a chance of reactions. These are usually mild and go away on their own, but serious reactions are also possible. Most people who get a flu shot do not have any problems with it.      Minor problems following a flu shot include:    soreness, redness, or swelling where the shot was given     hoarseness   sore, red or itchy eyes   cough   fever   aches   headache   itching   fatigue  If these problems occur, they usually begin soon after the shot and last 1 or 2 days. More serious problems following a flu shot can include the following:     There may be a small increased risk of Guillain-Barré Syndrome (GBS) after inactivated flu vaccine. This risk has been estimated at 1 or 2 additional cases per million people vaccinated. This is much lower than the risk of severe complications from flu, which can be prevented by flu vaccine.  Young children who get the flu shot along with pneumococcal vaccine (PCV13) and/or DTaP vaccine at the same time might be slightly more likely to have a seizure caused by fever. Ask your doctor for more information. Tell your doctor if a child who is getting flu vaccine has ever had a seizure. Problems that could happen after any injected vaccine:      People sometimes faint after a medical procedure, including vaccination. Sitting or lying down for about 15 minutes can help prevent fainting, and injuries caused by a fall. Tell your doctor if you feel dizzy, or have vision changes or ringing in the ears.  Some people get severe pain in the shoulder and have difficulty moving the arm where a shot was given. This happens very rarely.  Any medication can cause a severe allergic reaction. Such reactions from a vaccine are very rare, estimated at about 1 in a million doses, and would happen within a few minutes to a few hours after the vaccination. As with any medicine, there is a very remote chance of a vaccine causing a serious injury or death. The safety of vaccines is always being monitored. For more information, visit: www.cdc.gov/vaccinesafety/    5. What if there is a serious reaction? What should I look for?  Look for anything that concerns you, such as signs of a severe allergic reaction, very high fever, or unusual behavior.     Signs of a severe allergic reaction can include hives, swelling of the face and throat, difficulty breathing, a fast heartbeat, dizziness, and weakness  usually within a few minutes to a few hours after the vaccination. What should I do?  If you think it is a severe allergic reaction or other emergency that cant wait, call 9-1-1 and get the person to the nearest hospital. Otherwise, call your doctor.  Reactions should be reported to the Vaccine Adverse Event Reporting System (VAERS). Your doctor should file this report, or you can do it yourself through  the VAERS web site at www.vaers. [facility].gov, or by calling 4-865.565.7376. VAERS does not give medical advice. 6. The National Vaccine Injury Compensation Program    The [facility] Vaccine Injury Compensation Program (VICP) is a federal program that was created to compensate people who may have been injured by certain vaccines. Persons who believe they may have been injured by a vaccine can learn about the program and about filing a claim by calling 2-165.462.7838 or visiting the [employer] website at www.[facility].gov/vaccinecompensation. There is a time limit to file a claim for compensation. 7. How can I learn more?  Ask your healthcare provider. He or she can give you the vaccine package insert or suggest other sources of information.  Call your local or state health department.  Contact the Centers for Disease Control and Prevention (CDC):  - Call 8-661.302.5758 (1-800-CDC-INFO) or  - Visit CDCs website at www.cdc.gov/flu    Vaccine Information Statement   Inactivated Influenza Vaccine   8/7/2015  43 JOSÉ Mercedez Samantha [id number]    Department of Health and Human Services  Centers for Disease Control and Prevention    Office Use Only       Menopause Diet: Care Instructions  Your Care Instructions    Healthy eating helps ease menopause symptoms. And it can reduce your risk for getting conditions such as osteoporosis and heart disease. Follow-up care is a key part of your treatment and safety. Be sure to make and go to all appointments, and call your doctor if you are having problems.  It's also a good idea to know your test results and keep a list of the medicines you take. How can you care for yourself at home? · Limit fats in your diet. · Choose foods that have a lot of calcium. The recommended daily intake for adults ages 23 to 48 is 1,000 milligrams (mg). Adults over 50 need 1,200 mg a day. Take a calcium supplement if you don't get enough calcium in the foods you eat. · Add vitamin D to your daily diet. It helps your body use calcium. The recommended daily intake of vitamin D is 600 international units (IU) a day for children and adults up to age 79. Adults age 70 and older need 800 IU a day. Take vitamin D supplements if you need to. · Include good sources of fiber in your diet each day. These include whole grains, beans, fruits, and vegetables. · Avoid simple sugars. This helps if you have mood swings, anxiety, or depression. · Avoid caffeine, or cut back on it. Caffeine can cause sleep problems. It can also make you feel anxious. To relieve these symptoms, pay attention to how much caffeine you are getting in drinks and chocolate. · Limit your intake of alcohol. Heavy drinking tends to make symptoms worse. Where can you learn more? Go to http://rafaela-joss.info/. Enter Z720 in the search box to learn more about \"Menopause Diet: Care Instructions. \"  Current as of: May 12, 2017  Content Version: 11.4  © 4075-8404 [employer]. Care instructions adapted under license by [employer] (which disclaims liability or warranty for this information). If you have questions about a medical condition or this instruction, always ask your healthcare professional. [address] any warranty or liability for your use of this information. Learning About Menopause  What is menopause? For most women, menopause is a natural process of aging. Menstrual periods gradually stop. The ability to become pregnant ends.  Some women feel relief that their childbearing years are ending. But other women struggle with the physical and emotional changes that come with menopause. For most women, menopause happens around age 48. But every woman's body has its own timeline. Some women stop having periods in their mid-40s. Others keep having periods well into their 50s. And some women go through menopause early because of cancer treatment or surgery to remove the ovaries. What can you expect with menopause? · It starts with perimenopause. This is the process of change that leads up to menopause. Perimenopause can start as early as your late 35s or as late as your early 46s. How long it lasts varies. But it usually lasts from 2 to 8 years. · During this time, your hormone levels will go up and down unevenly (fluctuate). This causes changes in your periods and other symptoms. In time, estrogen and progesterone levels drop enough that the menstrual cycle stops. Going a full year without having a period is usually considered menopause. · Low estrogen levels after menopause speed bone loss. This increases your risk of osteoporosis. Also, your risk of heart disease increases after menopause. · It's normal to have thinner, dryer, wrinkled skin after menopause. The vaginal lining and the lower urinary tract also thin and weaken. This can make it hard to have sex. It can also increase the risk of vaginal and urinary tract infections. What are the symptoms? · Lighter or heavier periods. Your menstrual cycle may be longer or shorter. You may skip periods. · Hot flashes. You may have a sudden feeling of intense body heat. You may sweat, and your head, neck, and chest may get red. Along with hot flashes, you may have a heartbeat that's too fast or not regular. You may also feel anxious or grouchy. In rare cases you might feel panic. · Trouble sleeping. · Mood swings or feeling grouchy, depressed, or worried. · Problems with remembering or thinking clearly. · Vaginal dryness.   Some women have only a few mild symptoms. Others have severe symptoms that disrupt their sleep and daily lives. Symptoms tend to last or get worse the first year or more after menopause. Over time, hormones even out at low levels. Many symptoms improve or go away. But some women may have symptoms that don't go away. How are menopause symptoms treated? ?If you have mild symptoms, you may get some relief if you eat healthy foods, exercise, and lower your stress. Some women choose to take medicines if they have severe symptoms that aren't helped by making changes to their lifestyle. ? Lifestyle changes  ? · Choose a heart-healthy diet that is low in saturated fat. It should include plenty of fish, fruits, vegetables, beans, and high-fiber grains and breads. Be sure you get enough calcium and vitamin D to help your bones stay strong. Low-fat or nonfat dairy products are a great source of calcium. ? · Get regular exercise. Exercise can help you manage your weight, keep your heart and bones strong, and lift your mood. ? · Limit caffeine, alcohol, and stress. These things can make symptoms worse. Limiting them may help you sleep better. ? · If you smoke, stop. Quitting smoking can reduce hot flashes and long-term health risks. Medicines  ? If your symptoms are severe, talk with your doctor. You may want to try prescription medicines, such as:  ? · Low-dose birth control pills before menopause. ? · Low-dose hormone therapy (HT) after menopause. ? · Antidepressants. ? · A medicine called clonidine (Catapres) that is usually used to treat high blood pressure. ? All medicines for menopause symptoms have possible risks or side effects. A very small number of women develop serious health problems when taking hormone therapy. Be sure to talk to your doctor about your possible health risks before you start a treatment for menopause symptoms. ?Other treatments  ? You can try:  ? · Breathing exercises.  They may help reduce hot flashes and emotional symptoms. ? · Soy. Some women feel that eating lots of soy helps even out their menopause symptoms. ? · Yoga or biofeedback. They may help reduce stress. Follow-up care is a key part of your treatment and safety. Be sure to make and go to all appointments, and call your doctor if you are having problems. It's also a good idea to know your test results and keep a list of the medicines you take. Where can you learn more? Go to http://rafaela-joss.info/. Enter H199 in the search box to learn more about \"Learning About Menopause. \"  Current as of: October 13, 2016  Content Version: 11.4  © 6554-8525 [employer]. Care instructions adapted under license by [employer] (which disclaims liability or warranty for this information). If you have questions about a medical condition or this instruction, always ask your healthcare professional. [place] [address] any warranty or liability for your use of this information. Hot Flashes During Menopause: Care Instructions  Your Care Instructions    A hot flash is a sudden feeling of intense body heat. Your head, neck, and chest may get red. Your heartbeat may speed up, and you may feel anxious or irritable. You may find that hot flashes occur more often in warm rooms or during stressful times. Hot flashes and other symptoms are a normal response to the hormone changes that occur before your menstrual cycle goes away completely (menopause). Hot flashes often get better and go away with time. Making a few changes, such as exercising more, practicing meditation, quitting smoking, and drinking less alcohol, can help. Some women take hormone pills or other medicine to treat bothersome symptoms. Follow-up care is a key part of your treatment and safety. Be sure to make and go to all appointments, and call your doctor if you are having problems.  It's also a good idea to know your test results and keep a list of the medicines you take. How can you care for yourself at home? · If you decide to take medicine to treat hot flashes, take it exactly as prescribed. Call your doctor if you think you are having a problem with your medicine. You will get more details on the specific medicine your doctor prescribes. · Learn to meditate. Sit quietly and focus on your breathing. Try to practice each day. Books, classes, and tapes can help you start a program.  · Wear natural fabrics, such as cotton and silk. Dress in layers so you can take off clothes as needed. · Keep the room temperature cool, or use a fan. You are more likely to have a hot flash when you are too warm than when you are cool. · Use fewer blankets when you sleep at night. · Drink cold fluids rather than hot ones. Limit your intake of caffeine and alcohol. · Eat smaller meals more often during the day so your body makes less heat than when digesting large amounts of food. Eat low-fat and high-fiber foods. · Do not smoke. Smoking can make hot flashes worse. If you need help quitting, talk to your doctor about stop-smoking programs and medicines. These can increase your chances of quitting for good. · Get at least 30 minutes of exercise on most days of the week. Walking is a good choice. You also may want to do other activities, such as running, swimming, cycling, or playing tennis or team sports. Where can you learn more? Go to http://rafaela-joss.info/. Enter F700 in the search box to learn more about \"Hot Flashes During Menopause: Care Instructions. \"  Current as of: October 13, 2016  Content Version: 11.4  © 9145-6943 [employer]. Care instructions adapted under license by [employer] (which disclaims liability or warranty for this information).  If you have questions about a medical condition or this instruction, always ask your healthcare professional. Sean Linton disclaims any warranty or liability for your use of this information.

## 2017-12-30 LAB
ALBUMIN SERPL-MCNC: 4.1 G/DL (ref 3.5–5.5)
ALBUMIN/GLOB SERPL: 1.4 {RATIO} (ref 1.2–2.2)
ALP SERPL-CCNC: 60 IU/L (ref 39–117)
ALT SERPL-CCNC: 16 IU/L (ref 0–32)
APPEARANCE UR: CLEAR
AST SERPL-CCNC: 16 IU/L (ref 0–40)
BASOPHILS # BLD AUTO: 0 X10E3/UL (ref 0–0.2)
BASOPHILS NFR BLD AUTO: 1 %
BILIRUB SERPL-MCNC: 0.3 MG/DL (ref 0–1.2)
BILIRUB UR QL STRIP: NEGATIVE
BUN SERPL-MCNC: 14 MG/DL (ref 6–24)
BUN/CREAT SERPL: 17 (ref 9–23)
CALCIUM SERPL-MCNC: 10 MG/DL (ref 8.7–10.2)
CHLORIDE SERPL-SCNC: 105 MMOL/L (ref 96–106)
CHOLEST SERPL-MCNC: 164 MG/DL (ref 100–199)
CO2 SERPL-SCNC: 28 MMOL/L (ref 18–29)
COLOR UR: YELLOW
CREAT SERPL-MCNC: 0.81 MG/DL (ref 0.57–1)
EOSINOPHIL # BLD AUTO: 0.2 X10E3/UL (ref 0–0.4)
EOSINOPHIL NFR BLD AUTO: 4 %
ERYTHROCYTE [DISTWIDTH] IN BLOOD BY AUTOMATED COUNT: 15.1 % (ref 12.3–15.4)
EST. AVERAGE GLUCOSE BLD GHB EST-MCNC: 123 MG/DL
GLOBULIN SER CALC-MCNC: 3 G/DL (ref 1.5–4.5)
GLUCOSE SERPL-MCNC: 78 MG/DL (ref 65–99)
GLUCOSE UR QL: NEGATIVE
HBA1C MFR BLD: 5.9 % (ref 4.8–5.6)
HCT VFR BLD AUTO: 41.1 % (ref 34–46.6)
HDLC SERPL-MCNC: 55 MG/DL
HGB BLD-MCNC: 13.2 G/DL (ref 11.1–15.9)
HGB UR QL STRIP: NEGATIVE
IMM GRANULOCYTES # BLD: 0 X10E3/UL (ref 0–0.1)
IMM GRANULOCYTES NFR BLD: 0 %
KETONES UR QL STRIP: NEGATIVE
LDLC SERPL CALC-MCNC: 84 MG/DL (ref 0–99)
LEUKOCYTE ESTERASE UR QL STRIP: NEGATIVE
LYMPHOCYTES # BLD AUTO: 1.9 X10E3/UL (ref 0.7–3.1)
LYMPHOCYTES NFR BLD AUTO: 39 %
MCH RBC QN AUTO: 25.9 PG (ref 26.6–33)
MCHC RBC AUTO-ENTMCNC: 32.1 G/DL (ref 31.5–35.7)
MCV RBC AUTO: 81 FL (ref 79–97)
MICRO URNS: NORMAL
MONOCYTES # BLD AUTO: 0.4 X10E3/UL (ref 0.1–0.9)
MONOCYTES NFR BLD AUTO: 9 %
NEUTROPHILS # BLD AUTO: 2.4 X10E3/UL (ref 1.4–7)
NEUTROPHILS NFR BLD AUTO: 47 %
NITRITE UR QL STRIP: NEGATIVE
PH UR STRIP: 5 [PH] (ref 5–7.5)
PLATELET # BLD AUTO: 288 X10E3/UL (ref 150–379)
POTASSIUM SERPL-SCNC: 4.4 MMOL/L (ref 3.5–5.2)
PROT SERPL-MCNC: 7.1 G/DL (ref 6–8.5)
PROT UR QL STRIP: NEGATIVE
RBC # BLD AUTO: 5.1 X10E6/UL (ref 3.77–5.28)
SODIUM SERPL-SCNC: 146 MMOL/L (ref 134–144)
SP GR UR: 1.02 (ref 1–1.03)
TRIGL SERPL-MCNC: 126 MG/DL (ref 0–149)
TSH SERPL DL<=0.005 MIU/L-ACNC: 3.15 UIU/ML (ref 0.45–4.5)
UROBILINOGEN UR STRIP-MCNC: 0.2 MG/DL (ref 0.2–1)
VLDLC SERPL CALC-MCNC: 25 MG/DL (ref 5–40)
WBC # BLD AUTO: 4.9 X10E3/UL (ref 3.4–10.8)

## 2018-01-08 ENCOUNTER — HOSPITAL ENCOUNTER (OUTPATIENT)
Dept: ULTRASOUND IMAGING | Age: 58
Discharge: HOME | End: 2018-01-08
Attending: INTERNAL MEDICINE
Payer: COMMERCIAL

## 2018-01-08 DIAGNOSIS — E04.1 THYROID NODULE: Primary | ICD-10-CM

## 2018-01-08 DIAGNOSIS — E04.1 THYROID NODULE: ICD-10-CM

## 2018-01-08 PROCEDURE — 76536 US EXAM OF HEAD AND NECK: CPT

## 2018-01-09 DIAGNOSIS — E04.1 THYROID NODULE: Primary | ICD-10-CM

## 2018-01-10 DIAGNOSIS — E04.1 THYROID NODULE: Primary | ICD-10-CM

## 2018-01-11 DIAGNOSIS — I10 ESSENTIAL HYPERTENSION: Primary | ICD-10-CM

## 2018-01-11 DIAGNOSIS — E04.1 THYROID NODULE: ICD-10-CM

## 2018-01-17 ENCOUNTER — HOSPITAL ENCOUNTER (OUTPATIENT)
Dept: ULTRASOUND IMAGING | Age: 58
Discharge: HOME | End: 2018-01-17
Attending: INTERNAL MEDICINE
Payer: COMMERCIAL

## 2018-01-17 DIAGNOSIS — E04.1 THYROID NODULE: ICD-10-CM

## 2018-01-17 PROCEDURE — 88172 CYTP DX EVAL FNA 1ST EA SITE: CPT | Performed by: RADIOLOGY

## 2018-01-17 PROCEDURE — 10022 US GUIDE FINE NDL ASP W IMAGE: CPT

## 2018-01-17 PROCEDURE — 74011000250 HC RX REV CODE- 250: Performed by: RADIOLOGY

## 2018-01-17 PROCEDURE — 88173 CYTOPATH EVAL FNA REPORT: CPT | Performed by: RADIOLOGY

## 2018-01-17 RX ORDER — LIDOCAINE HYDROCHLORIDE 10 MG/ML
4 INJECTION, SOLUTION EPIDURAL; INFILTRATION; INTRACAUDAL; PERINEURAL
Status: COMPLETED | OUTPATIENT
Start: 2018-01-17 | End: 2018-01-17

## 2018-01-17 RX ADMIN — LIDOCAINE HYDROCHLORIDE 4 ML: 10 INJECTION, SOLUTION EPIDURAL; INFILTRATION; INTRACAUDAL; PERINEURAL at 09:40

## 2018-01-17 NOTE — DISCHARGE INSTRUCTIONS
[facility]  Department of Radiology  [phone] Ultrasound Department  [phone] Emergency Department    BIOPSY DISCHARGE INSTRUCTIONS for Therese Lua  General Instructions:  - A biopsy is the removal of a small piece of tissue for microscopic examination or testing.  - Healthy tissue can be obtained for the purpose of tissue-type matching for transplants. - Unhealthy tissues are more commonly biopsied to diagnose disease. General Biopsy:  - A mass can grow in any area of the body, and we are taking a specimen as ordered by your doctor. - The risks are the same. They include bleeding, pain, and infection. Home Care Instructions:  - You may resume your regular diet and medication regimen. - You may use over the counter acetaminophen (Tylenol) or ibuprofen (Advil) for the soreness. - You may apply an ice pack to the affected area for 20-30 minutes at time for the first 24 hours. -- After that, you may apply a heat pack. - You may remove the bandaid(s) tonight. - You may take a shower tonight. - Please keep the site clean and dry for 24-48 hours. - Do not soak in any kind of water (bath tub, hot tub, pool, ocean, etc) for 24-48 hours. - Do not participate in any kind of activity making you vigorously sweat for 24-48 hours. - Do not use any moisturizer or makeup on the site for 24-48 hours. Call If:  - You should call your doctor if you have any questions or concerns about the biopsy site. - Call if you should have increased pain, fever, redness, drainage, or bleeding more than a small spot on the bandage.      Follow-Up Instructions:  - Please see your doctor as he has requested.        ______________________________                                                   ______________________________  Doreen Reyes

## 2018-01-19 RX ORDER — HYDROCHLOROTHIAZIDE 12.5 MG/1
TABLET ORAL
Qty: 90 TAB | Refills: 3 | Status: SHIPPED | OUTPATIENT
Start: 2018-01-19 | End: 2019-01-16 | Stop reason: SDUPTHER

## 2018-02-13 PROBLEM — E04.1 RIGHT THYROID NODULE: Status: ACTIVE | Noted: 2018-01-17

## 2018-03-02 RX ORDER — AMLODIPINE BESYLATE 10 MG/1
TABLET ORAL
Qty: 90 TAB | Refills: 4 | Status: SHIPPED | OUTPATIENT
Start: 2018-03-02 | End: 2019-04-08 | Stop reason: SDUPTHER

## 2018-03-05 ENCOUNTER — CLINICAL SUPPORT (OUTPATIENT)
Dept: INTERNAL MEDICINE CLINIC | Age: 58
End: 2018-03-05

## 2018-03-05 VITALS — TEMPERATURE: 98.1 F

## 2018-03-05 DIAGNOSIS — Z23 ENCOUNTER FOR IMMUNIZATION: ICD-10-CM

## 2018-03-05 DIAGNOSIS — Z11.1 SCREENING EXAMINATION FOR PULMONARY TUBERCULOSIS: ICD-10-CM

## 2018-03-05 LAB
MM INDURATION POC: NORMAL MM (ref 0–5)
PPD POC: NORMAL NEGATIVE

## 2018-03-05 NOTE — MR AVS SNAPSHOT
[facility] 
 
 
 [address] [address] 
[phone] Patient: Po Ahn MRN: [id number] Visit Information Date & Time Provider Department Dept. Phone Encounter #  
 3/5/2018  9:50 AM [facility] [address] [place] [address] [phone] [id number] Your Appointments 6/29/2018  9:00 AM  
Any with Sienna Gonzalez MD  
[address] [place] [address] and Primary Care [address] [place] [address]) Appt Note: follow up 6mnths hypertension [address] [place]  
  
   
 [address] [id number] Upcoming Health Maintenance Date Due  
 PAP AKA CERVICAL CYTOLOGY 6/1/1982 FOBT Q 1 YEAR AGE 50-75 1/15/2018 BREAST CANCER SCRN MAMMOGRAM 7/8/2019 DTaP/Tdap/Td series (2 - Td) 12/29/2027 Allergies as of 3/5/2018  Review Complete On: 12/29/2017 By: Sienna Gonzalez MD  
 No Known Allergies Current Immunizations  Never Reviewed Name Date Influenza Vaccine (Quad) PF 12/29/2017 TB Skin Test (PPD) Intradermal  Incomplete Not reviewed this visit You Were Diagnosed With   
  
 Codes Comments Encounter for immunization     ICD-10-CM: P22 ICD-9-CM: V03.89 Screening examination for pulmonary tuberculosis     ICD-10-CM: Z11.1 ICD-9-CM: V74.1 Vitals Temp OB Status Smoking Status 98.1 °F (36.7 °C) Menopause Never Smoker Preferred Pharmacy Pharmacy Name Phone [id number], [place] - [address] [phone] Your Updated Medication List  
  
   
This list is accurate as of 3/5/18 10:25 AM.  Always use your most recent med list. amLODIPine 10 mg tablet Commonly known as:  John Nearing TAKE 0.5 TABS BY MOUTH DAILY. black cohosh 20 mg Tab Take 40 mg by mouth daily. hydroCHLOROthiazide 12.5 mg tablet Commonly known as:  HYDRODIURIL  
TAKE 1 TAB BY MOUTH DAILY. PARoxetine 20 mg tablet Commonly known as:  PAXIL Take 1 Tab by mouth daily. We Performed the Following AMB POC TUBERCULOSIS, INTRADERMAL (SKIN TEST) [34922 CPT(R)] MA IMMUNIZ ADMIN,1 SINGLE/COMB VAC/TOXOID [id number] CPT(R)] Introducing [facility]! Sancho Yan introduces [facility] patient portal. Now you can access parts of your medical record, email your doctor's office, and request medication refills online. 1. In your internet browser, go to [email or web address] 2. Click on the First Time User? Click Here link in the Sign In box. You will see the New Member Sign Up page. 3. Enter your [facility] Access Code exactly as it appears below. You will not need to use this code after youve completed the sign-up process. If you do not sign up before the expiration date, you must request a new code. · [facility] Access Code: [email or web address] Expires: 3/29/2018 10:50 AM 
 
4. Enter the last four digits of your Social Security Number (xxxx) and Date of Birth (mm/dd/yyyy) as indicated and click Submit. You will be taken to the next sign-up page. 5. Create a [facility] ID. This will be your [facility] login ID and cannot be changed, so think of one that is secure and easy to remember. 6. Create a [facility] password. You can change your password at any time. 7. Enter your Password Reset Question and Answer. This can be used at a later time if you forget your password. 8. Enter your e-mail address. You will receive e-mail notification when new information is available in [address] 9. Click Sign Up. You can now view and download portions of your medical record. 10. Click the Download Summary menu link to download a portable copy of your medical information. If you have questions, please visit the Frequently Asked Questions section of the [facility] website.  Remember, [facility] is NOT to be used for urgent needs. For medical emergencies, dial 911. Now available from your iPhone and Android! Please provide this summary of care documentation to your next provider. Your primary care clinician is listed as Anna Beaver. If you have any questions after today's visit, please call [phone].

## 2018-03-06 NOTE — PROGRESS NOTES
Patient was given PPD  0.1 mL in LFA . Patient was instructed to RTO before 3-8-18 to have it read.   Kanika Vera LPN

## 2018-03-30 RX ORDER — PAROXETINE 10 MG/1
TABLET, FILM COATED ORAL
Qty: 30 TAB | Refills: 3 | Status: SHIPPED | OUTPATIENT
Start: 2018-03-30 | End: 2018-07-29 | Stop reason: SDUPTHER

## 2018-05-12 DIAGNOSIS — E04.1 THYROID NODULE: ICD-10-CM

## 2018-05-24 DIAGNOSIS — E04.1 RIGHT THYROID NODULE: Primary | ICD-10-CM

## 2018-06-29 ENCOUNTER — OFFICE VISIT (OUTPATIENT)
Dept: INTERNAL MEDICINE CLINIC | Age: 58
End: 2018-06-29

## 2018-06-29 VITALS
HEART RATE: 73 BPM | SYSTOLIC BLOOD PRESSURE: 113 MMHG | TEMPERATURE: 97.9 F | WEIGHT: 236.8 LBS | HEIGHT: 66 IN | RESPIRATION RATE: 18 BRPM | DIASTOLIC BLOOD PRESSURE: 75 MMHG | BODY MASS INDEX: 38.06 KG/M2 | OXYGEN SATURATION: 96 %

## 2018-06-29 DIAGNOSIS — E04.1 RIGHT THYROID NODULE: ICD-10-CM

## 2018-06-29 DIAGNOSIS — R19.00 PELVIC MASS IN FEMALE: ICD-10-CM

## 2018-06-29 DIAGNOSIS — E66.01 SEVERE OBESITY (BMI 35.0-39.9): ICD-10-CM

## 2018-06-29 DIAGNOSIS — I10 ESSENTIAL HYPERTENSION: Primary | ICD-10-CM

## 2018-06-29 NOTE — PROGRESS NOTES
SPORTS MEDICINE AND PRIMARY CARE  Renita Junior MD, [address] [address]  Phone:  [phone]  Fax: [phone]       Chief Complaint   Patient presents with    Hypertension     f/u   . SUBJECTIVE:    Bennie Reaves is a 63 y.o. female The patient returns today with known history of primary hypertension, perimenopausal symptoms, prediabetes, obesity, a thyroid nodule with fine needle aspiration that revealed atypia of undetermined significance and recommended repeat. The patient returns today without new complaints. She continues to take care of Tabitha Patterson and is a very faithful wife. She sates her  is doing well. Current Outpatient Prescriptions   Medication Sig Dispense Refill    PARoxetine (PAXIL) 10 mg tablet TAKE 1 TAB BY MOUTH DAILY. 30 Tab 3    amLODIPine (NORVASC) 10 mg tablet TAKE 0.5 TABS BY MOUTH DAILY. 90 Tab 4    hydroCHLOROthiazide (HYDRODIURIL) 12.5 mg tablet TAKE 1 TAB BY MOUTH DAILY. 90 Tab 3    black cohosh 20 mg tab Take 40 mg by mouth daily.  27 Tab 11     Past Medical History:   Diagnosis Date    Arthritis     Encounter for Hemoccult screening 01/24/2017    neg    Hot flash, menopausal 2015    Hypertension 2000    Obesity 2000    Pelvic mass in female     Prediabetes 12/29/17    Right thyroid nodule 01/17/2018    Atypia of undetermined significance - repeat recommended    [place]     Past Surgical History:   Procedure Laterality Date    HX GYN      HX ORTHOPAEDIC       No Known Allergies      REVIEW OF SYSTEMS:  General: negative for - chills or fever  ENT: negative for - headaches, nasal congestion or tinnitus  Respiratory: negative for - cough, hemoptysis, shortness of breath or wheezing  Cardiovascular : negative for - chest pain, edema, palpitations or shortness of breath  Gastrointestinal: negative for - abdominal pain, blood in stools, heartburn or nausea/vomiting  Genito-Urinary: no dysuria, trouble voiding, or hematuria  Musculoskeletal: negative for - gait disturbance, joint pain, joint stiffness or joint swelling  Neurological: no TIA or stroke symptoms  Hematologic: no bruises, no bleeding, no swollen glands  Integument: no lumps, mole changes, nail changes or rash  Endocrine: no malaise/lethargy or unexpected weight changes      Social History     Social History    Marital status:      Spouse name: N/A    Number of children: N/A    Years of education: N/A     Social History Main Topics    Smoking status: Never Smoker    Smokeless tobacco: Never Used    Alcohol use No    Drug use: No    Sexual activity: Yes     Partners: Male     Other Topics Concern    None     Social History Narrative    Habits: There is no history of smoking, alcohol abuse or drug abuse.           Social History:  The patient is  and lives with her . The patient is the mother of 29 25and 39year-old children and three grandchildren. 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 Her [employer]ism background is [employer].          Family History:  Father  at 79 of a sickness. Mother  of complications of alcoholism. She has eight siblings. History reviewed. No pertinent family history. OBJECTIVE:    Visit Vitals    /75    Pulse 73    Temp 97.9 °F (36.6 °C) (Oral)    Resp 18    Ht 5' 6\" (1.676 m)    Wt 236 lb 12.8 oz (107.4 kg)    SpO2 96%    BMI 38.22 kg/m2     CONSTITUTIONAL: well , well nourished, appears age appropriate  EYES: perrla, eom intact  ENMT:moist mucous membranes, pharynx clear  NECK: supple.  Thyroid normal  RESPIRATORY: Chest: clear bilaterally   CARDIOVASCULAR: Heart: regular rate and rhythm  GASTROINTESTINAL: Abdomen: soft, bowel sounds active  HEMATOLOGIC: no pathological lymph nodes palpated  MUSCULOSKELETAL: Extremities: no edema, pulse 1+   INTEGUMENT: No unusual rashes or suspicious skin lesions noted. Nails appear normal.  NEUROLOGIC: non-focal exam   MENTAL STATUS: alert and oriented, appropriate affect           ASSESSMENT:  1. Essential hypertension    2. Right thyroid nodule    3. Pelvic mass in female    4. Severe obesity (BMI 35.0-39.9) (HCC)      On palpation, the thyroid nodule has not changed. We advised her the atypical cells seen on fine-needle aspiration and the recommendation for repeat aspiration. We gave her the alternative of surgical removal of the thyroid, specifically of the thyroid nodule, neither of which does she desire to have done. We gave her the consequences of her decision including the possibility of missing cancer of her thyroid. She is going to remain conservative. Abdominal examination reveals an enlarged fibroid presumably. We will ask for a pelvic sonogram but also refer her to gynecology for a Pap smear. She has not had a Pap smear since she has been here. Her BMI was discussed. Blood pressure control is at goal.     She will return to see us in about six months, sooner if she has any problems. She is reminded that she can walk in to see us at any time should she have an urgency and is unable to get an appointment. The patient is agreeable to repeating the ultrasound a year after the first one, therefore before her next appointment, we will have the ultrasound completed. PLAN:  .  Orders Placed This Encounter    US PELV NON OBS    US THYROID/PARATHYROID/SOFT TISS    [facility]       Follow-up Disposition:  Return in about 7 months (around 1/29/2019). ATTENTION:   This medical record was transcribed using an electronic medical records system. Although proofread, it may and can contain electronic and spelling errors. Other human spelling and other errors may be present. Corrections may be executed at a later time. Please feel free to contact us for any clarifications as needed.

## 2018-06-29 NOTE — PATIENT INSTRUCTIONS
Body Mass Index: Care Instructions  Your Care Instructions    Body mass index (BMI) can help you see if your weight is raising your risk for health problems. It uses a formula to compare how much you weigh with how tall you are. · A BMI lower than 18.5 is considered underweight. · A BMI between 18.5 and 24.9 is considered healthy. · A BMI between 25 and 29.9 is considered overweight. A BMI of 30 or higher is considered obese. If your BMI is in the normal range, it means that you have a lower risk for weight-related health problems. If your BMI is in the overweight or obese range, you may be at increased risk for weight-related health problems, such as high blood pressure, heart disease, stroke, arthritis or joint pain, and diabetes. If your BMI is in the underweight range, you may be at increased risk for health problems such as fatigue, lower protection (immunity) against illness, muscle loss, bone loss, hair loss, and hormone problems. BMI is just one measure of your risk for weight-related health problems. You may be at higher risk for health problems if you are not active, you eat an unhealthy diet, or you drink too much alcohol or use tobacco products. Follow-up care is a key part of your treatment and safety. Be sure to make and go to all appointments, and call your doctor if you are having problems. It's also a good idea to know your test results and keep a list of the medicines you take. How can you care for yourself at home? · Practice healthy eating habits. This includes eating plenty of fruits, vegetables, whole grains, lean protein, and low-fat dairy. · If your doctor recommends it, get more exercise. Walking is a good choice. Bit by bit, increase the amount you walk every day. Try for at least 30 minutes on most days of the week. · Do not smoke. Smoking can increase your risk for health problems. If you need help quitting, talk to your doctor about stop-smoking programs and medicines. These can increase your chances of quitting for good. · Limit alcohol to 2 drinks a day for men and 1 drink a day for women. Too much alcohol can cause health problems. If you have a BMI higher than 25  · Your doctor may do other tests to check your risk for weight-related health problems. This may include measuring the distance around your waist. A waist measurement of more than 40 inches in men or 35 inches in women can increase the risk of weight-related health problems. · Talk with your doctor about steps you can take to stay healthy or improve your health. You may need to make lifestyle changes to lose weight and stay healthy, such as changing your diet and getting regular exercise. If you have a BMI lower than 18.5  · Your doctor may do other tests to check your risk for health problems. · Talk with your doctor about steps you can take to stay healthy or improve your health. You may need to make lifestyle changes to gain or maintain weight and stay healthy, such as getting more healthy foods in your diet and doing exercises to build muscle. Where can you learn more? Go to http://rafaela-joss.info/. Enter S176 in the search box to learn more about \"Body Mass Index: Care Instructions. \"  Current as of: October 13, 2016  Content Version: 11.4  © 3067-2917 Healthwise, Incorporated. Care instructions adapted under license by [employer] (which disclaims liability or warranty for this information). If you have questions about a medical condition or this instruction, always ask your healthcare professional. [address] any warranty or liability for your use of this information.

## 2018-06-29 NOTE — MR AVS SNAPSHOT
Sameera Pulido 
 
 
 [address] [address] 
[phone] Patient: Parris Estrada MRN: [id number] NQR:0/0/8213 Visit Information Date & Time Provider Department Dept. Phone Encounter #  
 6/29/2018  9:00 AM [facility] [phone] [id number] Follow-up Instructions Return in about 7 months (around 1/29/2019). Follow-up and Disposition History Your Appointments 12/20/2018  9:00 AM  
Any with La Leyva MD  
[address] [place] [address] and Primary Care Ale Johnson) Appt Note: 6 mos follow up  
 [address] [facility]  
  
   
 [address] [id number] Upcoming Health Maintenance Date Due COLONOSCOPY 6/1/1979 PAP AKA CERVICAL CYTOLOGY 6/1/1982 Influenza Age 5 to Adult 8/1/2018 BREAST CANCER SCRN MAMMOGRAM 7/8/2019 DTaP/Tdap/Td series (2 - Td) 12/29/2027 Allergies as of 6/29/2018  Review Complete On: 6/29/2018 By: La Leyva MD  
 No Known Allergies Current Immunizations  Never Reviewed Name Date Influenza Vaccine (Quad) PF 12/29/2017 TB Skin Test (PPD) Intradermal  Incomplete Not reviewed this visit You Were Diagnosed With   
  
 Codes Comments Essential hypertension    -  Primary ICD-10-CM: I10 
ICD-9-CM: 401.9 Right thyroid nodule     ICD-10-CM: E04.1 ICD-9-CM: 241.0 Pelvic mass in female     ICD-10-CM: R19.00 ICD-9-CM: 789.30 Severe obesity (BMI 35.0-39.9) (HCC)     ICD-10-CM: E66.01 
ICD-9-CM: 278.01 Vitals BP Pulse Temp Resp Height(growth percentile) Weight(growth percentile) 113/75 73 97.9 °F (36.6 °C) (Oral) 18 5' 6\" (1.676 m) 236 lb 12.8 oz (107.4 kg) SpO2 BMI OB Status Smoking Status 96% 38.22 kg/m2 Menopause Never Smoker Vitals History BMI and BSA Data Body Mass Index Body Surface Area 38.22 kg/m 2 2.24 m 2 Preferred Pharmacy Pharmacy Name Phone [id number]- [facility] [address] [phone] Your Updated Medication List  
  
   
This list is accurate as of 6/29/18 10:16 AM.  Always use your most recent med list. amLODIPine 10 mg tablet Commonly known as:  Achilles Kitzmiller TAKE 0.5 TABS BY MOUTH DAILY. black cohosh 20 mg Tab Take 40 mg by mouth daily. hydroCHLOROthiazide 12.5 mg tablet Commonly known as:  HYDRODIURIL  
TAKE 1 TAB BY MOUTH DAILY. PARoxetine 10 mg tablet Commonly known as:  PAXIL TAKE 1 TAB BY MOUTH DAILY. We Performed the Following REFERRAL TO OBSTETRICS AND GYNECOLOGY [REF51 Custom] Follow-up Instructions Return in about 7 months (around 1/29/2019). To-Do List   
 06/29/2018 Imaging:  US PELV NON OBS   
  
 01/28/2019 Imaging:  US THYROID/PARATHYROID/SOFT TISS Referral Information Referral ID Referred By Referred To  
  
 [id number] Germaine Galdamez [address] Visits Status Start Date End Date 1 New Request 6/29/18 6/29/19 If your referral has a status of pending review or denied, additional information will be sent to support the outcome of this decision. Patient Instructions Body Mass Index: Care Instructions Your Care Instructions Body mass index (BMI) can help you see if your weight is raising your risk for health problems. It uses a formula to compare how much you weigh with how tall you are. · A BMI lower than 18.5 is considered underweight. · A BMI between 18.5 and 24.9 is considered healthy. · A BMI between 25 and 29.9 is considered overweight. A BMI of 30 or higher is considered obese.  
If your BMI is in the normal range, it means that you have a lower risk for weight-related health problems. If your BMI is in the overweight or obese range, you may be at increased risk for weight-related health problems, such as high blood pressure, heart disease, stroke, arthritis or joint pain, and diabetes. If your BMI is in the underweight range, you may be at increased risk for health problems such as fatigue, lower protection (immunity) against illness, muscle loss, bone loss, hair loss, and hormone problems. BMI is just one measure of your risk for weight-related health problems. You may be at higher risk for health problems if you are not active, you eat an unhealthy diet, or you drink too much alcohol or use tobacco products. Follow-up care is a key part of your treatment and safety. Be sure to make and go to all appointments, and call your doctor if you are having problems. It's also a good idea to know your test results and keep a list of the medicines you take. How can you care for yourself at home? · Practice healthy eating habits. This includes eating plenty of fruits, vegetables, whole grains, lean protein, and low-fat dairy. · If your doctor recommends it, get more exercise. Walking is a good choice. Bit by bit, increase the amount you walk every day. Try for at least 30 minutes on most days of the week. · Do not smoke. Smoking can increase your risk for health problems. If you need help quitting, talk to your doctor about stop-smoking programs and medicines. These can increase your chances of quitting for good. · Limit alcohol to 2 drinks a day for men and 1 drink a day for women. Too much alcohol can cause health problems. If you have a BMI higher than 25 · Your doctor may do other tests to check your risk for weight-related health problems. This may include measuring the distance around your waist. A waist measurement of more than 40 inches in men or 35 inches in women can increase the risk of weight-related health problems. · Talk with your doctor about steps you can take to stay healthy or improve your health. You may need to make lifestyle changes to lose weight and stay healthy, such as changing your diet and getting regular exercise. If you have a BMI lower than 18.5 · Your doctor may do other tests to check your risk for health problems. · Talk with your doctor about steps you can take to stay healthy or improve your health. You may need to make lifestyle changes to gain or maintain weight and stay healthy, such as getting more healthy foods in your diet and doing exercises to build muscle. Where can you learn more? Go to http://rafaela-joss.info/. Enter S176 in the search box to learn more about \"Body Mass Index: Care Instructions. \" Current as of: October 13, 2016 Content Version: 11.4 © 4719-9666 [employer]. Care instructions adapted under license by [employer] (which disclaims liability or warranty for this information). If you have questions about a medical condition or this instruction, always ask your healthcare professional. [address] any warranty or liability for your use of this information. Introducing [facility]! [facility] introduces Traveler | VIP patient portal. Now you can access parts of your medical record, email your doctor's office, and request medication refills online. 1. In your internet browser, go to [email or web address] 2. Click on the First Time User? Click Here link in the Sign In box. You will see the New Member Sign Up page. 3. Enter your Traveler | VIP Access Code exactly as it appears below. You will not need to use this code after youve completed the sign-up process. If you do not sign up before the expiration date, you must request a new code. · Traveler | VIP Access Code: [email or web address] Expires: 8/23/2018  1:35 PM 
 
4.  Enter the last four digits of your Social Security Number (xxxx) and Date of Birth (mm/dd/yyyy) as indicated and click Submit. You will be taken to the next sign-up page. 5. Create a Vastrm ID. This will be your Vastrm login ID and cannot be changed, so think of one that is secure and easy to remember. 6. Create a Vastrm password. You can change your password at any time. 7. Enter your Password Reset Question and Answer. This can be used at a later time if you forget your password. 8. Enter your e-mail address. You will receive e-mail notification when new information is available in [address] 9. Click Sign Up. You can now view and download portions of your medical record. 10. Click the Download Summary menu link to download a portable copy of your medical information. If you have questions, please visit the Frequently Asked Questions section of the Vastrm website. Remember, Vastrm is NOT to be used for urgent needs. For medical emergencies, dial 911. Now available from your iPhone and Android! Please provide this summary of care documentation to your next provider. Your primary care clinician is listed as Elis Mendiola. If you have any questions after today's visit, please call [phone].

## 2018-07-29 RX ORDER — PAROXETINE 10 MG/1
TABLET, FILM COATED ORAL
Qty: 30 TAB | Refills: 3 | Status: SHIPPED | OUTPATIENT
Start: 2018-07-29 | End: 2018-12-07 | Stop reason: SDUPTHER

## 2018-09-12 ENCOUNTER — HOSPITAL ENCOUNTER (OUTPATIENT)
Dept: MAMMOGRAPHY | Age: 58
Discharge: HOME | End: 2018-09-12
Attending: INTERNAL MEDICINE
Payer: COMMERCIAL

## 2018-09-12 DIAGNOSIS — Z12.39 BREAST SCREENING: ICD-10-CM

## 2018-09-12 PROCEDURE — 77067 SCR MAMMO BI INCL CAD: CPT

## 2018-12-07 RX ORDER — PAROXETINE 10 MG/1
TABLET, FILM COATED ORAL
Qty: 30 TAB | Refills: 3 | Status: SHIPPED | OUTPATIENT
Start: 2018-12-07 | End: 2019-04-13 | Stop reason: SDUPTHER

## 2018-12-20 ENCOUNTER — OFFICE VISIT (OUTPATIENT)
Dept: INTERNAL MEDICINE CLINIC | Age: 58
End: 2018-12-20

## 2018-12-20 VITALS
HEIGHT: 66 IN | BODY MASS INDEX: 38.11 KG/M2 | OXYGEN SATURATION: 96 % | DIASTOLIC BLOOD PRESSURE: 89 MMHG | RESPIRATION RATE: 18 BRPM | TEMPERATURE: 98 F | WEIGHT: 237.1 LBS | SYSTOLIC BLOOD PRESSURE: 131 MMHG | HEART RATE: 77 BPM

## 2018-12-20 DIAGNOSIS — E66.01 SEVERE OBESITY WITH BODY MASS INDEX (BMI) OF 35.0 TO 39.9 WITH SERIOUS COMORBIDITY (HCC): ICD-10-CM

## 2018-12-20 DIAGNOSIS — E04.1 RIGHT THYROID NODULE: ICD-10-CM

## 2018-12-20 DIAGNOSIS — Z23 ENCOUNTER FOR IMMUNIZATION: Primary | ICD-10-CM

## 2018-12-20 DIAGNOSIS — R73.03 PREDIABETES: ICD-10-CM

## 2018-12-20 DIAGNOSIS — I10 ESSENTIAL HYPERTENSION: ICD-10-CM

## 2018-12-20 NOTE — PATIENT INSTRUCTIONS
Vaccine Information Statement    Influenza (Flu) Vaccine (Inactivated or Recombinant): What you need to know    Many Vaccine Information Statements are available in Nepali and other languages. See www.immunize.org/vis  Hojas de Información Sobre Vacunas están disponibles en Español y en muchos otros idiomas. Visite www.immunize.org/vis    1. Why get vaccinated? Influenza (flu) is a contagious disease that spreads around the [place] every year, usually between October and May. Flu is caused by influenza viruses, and is spread mainly by coughing, sneezing, and close contact. Anyone can get flu. Flu strikes suddenly and can last several days. Symptoms vary by age, but can include:   fever/chills   sore throat   muscle aches   fatigue   cough   headache    runny or stuffy nose    Flu can also lead to pneumonia and blood infections, and cause diarrhea and seizures in children. If you have a medical condition, such as heart or lung disease, flu can make it worse. Flu is more dangerous for some people. Infants and young children, people 72years of age and older, pregnant women, and people with certain health conditions or a weakened immune system are at greatest risk. Each year thousands of people in the [facility] die from flu, and many more are hospitalized. Flu vaccine can:   keep you from getting flu,   make flu less severe if you do get it, and   keep you from spreading flu to your family and other people. 2. Inactivated and recombinant flu vaccines    A dose of flu vaccine is recommended every flu season. Children 6 months through 6years of age may need two doses during the same flu season. Everyone else needs only one dose each flu season.        Some inactivated flu vaccines contain a very small amount of a mercury-based preservative called thimerosal. Studies have not shown thimerosal in vaccines to be harmful, but flu vaccines that do not contain thimerosal are available. There is no live flu virus in flu shots. They cannot cause the flu. There are many flu viruses, and they are always changing. Each year a new flu vaccine is made to protect against three or four viruses that are likely to cause disease in the upcoming flu season. But even when the vaccine doesnt exactly match these viruses, it may still provide some protection    Flu vaccine cannot prevent:   flu that is caused by a virus not covered by the vaccine, or   illnesses that look like flu but are not. It takes about 2 weeks for protection to develop after vaccination, and protection lasts through the flu season. 3. Some people should not get this vaccine    Tell the person who is giving you the vaccine:     If you have any severe, life-threatening allergies. If you ever had a life-threatening allergic reaction after a dose of flu vaccine, or have a severe allergy to any part of this vaccine, you may be advised not to get vaccinated. Most, but not all, types of flu vaccine contain a small amount of egg protein.  If you ever had Guillain-Barré Syndrome (also called GBS). Some people with a history of GBS should not get this vaccine. This should be discussed with your doctor.  If you are not feeling well. It is usually okay to get flu vaccine when you have a mild illness, but you might be asked to come back when you feel better. 4. Risks of a vaccine reaction    With any medicine, including vaccines, there is a chance of reactions. These are usually mild and go away on their own, but serious reactions are also possible. Most people who get a flu shot do not have any problems with it.      Minor problems following a flu shot include:    soreness, redness, or swelling where the shot was given     hoarseness   sore, red or itchy eyes   cough   fever   aches   headache   itching   fatigue  If these problems occur, they usually begin soon after the shot and last 1 or 2 days. More serious problems following a flu shot can include the following:     There may be a small increased risk of Guillain-Barré Syndrome (GBS) after inactivated flu vaccine. This risk has been estimated at 1 or 2 additional cases per million people vaccinated. This is much lower than the risk of severe complications from flu, which can be prevented by flu vaccine.  Young children who get the flu shot along with pneumococcal vaccine (PCV13) and/or DTaP vaccine at the same time might be slightly more likely to have a seizure caused by fever. Ask your doctor for more information. Tell your doctor if a child who is getting flu vaccine has ever had a seizure. Problems that could happen after any injected vaccine:      People sometimes faint after a medical procedure, including vaccination. Sitting or lying down for about 15 minutes can help prevent fainting, and injuries caused by a fall. Tell your doctor if you feel dizzy, or have vision changes or ringing in the ears.  Some people get severe pain in the shoulder and have difficulty moving the arm where a shot was given. This happens very rarely.  Any medication can cause a severe allergic reaction. Such reactions from a vaccine are very rare, estimated at about 1 in a million doses, and would happen within a few minutes to a few hours after the vaccination. As with any medicine, there is a very remote chance of a vaccine causing a serious injury or death. The safety of vaccines is always being monitored. For more information, visit: www.cdc.gov/vaccinesafety/    5. What if there is a serious reaction? What should I look for?  Look for anything that concerns you, such as signs of a severe allergic reaction, very high fever, or unusual behavior.     Signs of a severe allergic reaction can include hives, swelling of the face and throat, difficulty breathing, a fast heartbeat, dizziness, and weakness  usually within a few minutes to a few hours after the vaccination. What should I do?  If you think it is a severe allergic reaction or other emergency that cant wait, call 9-1-1 and get the person to the nearest hospital. Otherwise, call your doctor.  Reactions should be reported to the Vaccine Adverse Event Reporting System (VAERS). Your doctor should file this report, or you can do it yourself through  the VAERS web site at www.vaers. [facility].gov, or by calling 5-532.643.7594. VAERS does not give medical advice. 6. The National Vaccine Injury Compensation Program    The [facility] Vaccine Injury Compensation Program (VICP) is a federal program that was created to compensate people who may have been injured by certain vaccines. Persons who believe they may have been injured by a vaccine can learn about the program and about filing a claim by calling 1-859.750.8203 or visiting the [employer] website at www.[facility].gov/vaccinecompensation. There is a time limit to file a claim for compensation. 7. How can I learn more?  Ask your healthcare provider. He or she can give you the vaccine package insert or suggest other sources of information.  Call your local or state health department.  Contact the Centers for Disease Control and Prevention (CDC):  - Call 8-191.959.6451 (1-800-CDC-INFO) or  - Visit CDCs website at www.cdc.gov/flu    Vaccine Information Statement   Inactivated Influenza Vaccine   8/7/2015  [address] JOS[address] [id number]-61    Department of Health and Human Services  Centers for Disease Control and Prevention    Office Use Only

## 2018-12-20 NOTE — PROGRESS NOTES
SPORTS MEDICINE AND PRIMARY CARE  Lillian Borden MD, [address] [address]  Phone:  [phone]  Fax: [phone]       Chief Complaint   Patient presents with    Hypertension   . SUBJECTIVE:    Hammad Pan is a 63 y.o. female Patient returns today with known history of primary hypertension, prediabetes, thyroid nodule, atypical of undetermined significance, and recommended repeat. Patient declined and is seen for evaluation. Current Outpatient Medications   Medication Sig Dispense Refill    PARoxetine (PAXIL) 10 mg tablet TAKE 1 TAB BY MOUTH DAILY. 30 Tab 3    amLODIPine (NORVASC) 10 mg tablet TAKE 0.5 TABS BY MOUTH DAILY. 90 Tab 4    hydroCHLOROthiazide (HYDRODIURIL) 12.5 mg tablet TAKE 1 TAB BY MOUTH DAILY. 90 Tab 3    black cohosh 20 mg tab Take 40 mg by mouth daily.  27 Tab 11     Past Medical History:   Diagnosis Date    Arthritis     Encounter for Hemoccult screening 01/24/2017    neg    Hot flash, menopausal 2015    Hypertension 2000    Menopause     Obesity 2000    Pelvic mass in female     Prediabetes 12/29/17    Right thyroid nodule 01/17/2018    Atypia of undetermined significance - repeat recommended    [place] 11[place]     Past Surgical History:   Procedure Laterality Date    HX GYN      HX ORTHOPAEDIC       No Known Allergies      REVIEW OF SYSTEMS:  General: negative for - chills or fever  ENT: negative for - headaches, nasal congestion or tinnitus  Respiratory: negative for - cough, hemoptysis, shortness of breath or wheezing  Cardiovascular : negative for - chest pain, edema, palpitations or shortness of breath  Gastrointestinal: negative for - abdominal pain, blood in stools, heartburn or nausea/vomiting  Genito-Urinary: no dysuria, trouble voiding, or hematuria  Musculoskeletal: negative for - gait disturbance, joint pain, joint stiffness or joint swelling  Neurological: no TIA or stroke symptoms  Hematologic: no bruises, no bleeding, no swollen glands  Integument: no lumps, mole changes, nail changes or rash  Endocrine: no malaise/lethargy or unexpected weight changes      Social History     Socioeconomic History    Marital status:      Spouse name: Not on file    Number of children: Not on file    Years of education: Not on file    Highest education level: Not on file   Tobacco Use    Smoking status: Never Smoker    Smokeless tobacco: Never Used   Substance and Sexual Activity    Alcohol use: No    Drug use: No    Sexual activity: Yes     Partners: Male   Social History Narrative    Habits: There is no history of smoking, alcohol abuse or drug abuse.           Social History:  The patient is  and lives with her . The patient is the mother of 29 26[address]year-old children and three grandchildren. 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 Her Gnosticism background is Synagogue.          Family History:  Father  at 79 of a sickness. Mother  of complications of alcoholism. She has eight siblings. Family History   Problem Relation Age of Onset    Breast Cancer Maternal Grandmother     Breast Cancer Maternal Aunt        OBJECTIVE:    Visit Vitals  /89   Pulse 77   Temp 98 °F (36.7 °C) (Oral)   Resp 18   Ht 5' 6\" (1.676 m)   Wt 237 lb 1.6 oz (107.5 kg)   SpO2 96%   BMI 38.27 kg/m²     CONSTITUTIONAL: well , well nourished, appears age appropriate  EYES: perrla, eom intact  ENMT:moist mucous membranes, pharynx clear  NECK: supple. Thyroid normal  RESPIRATORY: Chest: clear bilaterally   CARDIOVASCULAR: Heart: regular rate and rhythm  GASTROINTESTINAL: Abdomen: soft, bowel sounds active  HEMATOLOGIC: no pathological lymph nodes palpated  MUSCULOSKELETAL: Extremities: no edema, pulse 1+   INTEGUMENT: No unusual rashes or suspicious skin lesions noted.  Nails appear normal.  NEUROLOGIC: non-focal exam   MENTAL STATUS: alert and oriented, appropriate affect           ASSESSMENT:  1. Encounter for immunization    2. Essential hypertension    3. Severe obesity with body mass index (BMI) of 35.0 to 39.9 with serious comorbidity ([address]    4. Right thyroid nodule    5. Prediabetes      Patient's medical status is stable. We see that she had a wonderful Thanksgiving and we hope she has a nice [place] and New Year's and at the beginning of the year begins a heart healthy, weight reducing diet with physical activity for 30 minutes five days a week. We discuss with her the atypical cells seen on her thyroid biopsy, as opposed to repeating the biopsy we will get a second opinion from Dr. Kilo Contreras and she is in agreement with this. Her blood pressure control is approaching goal.    Laboratory studies will be requested. We ask her to try and get the colon results. I would like to see them before feeling comfortable that everything is okay. She will be back to see us in one year, sooner if she has any problems. She is reminded she can walk in to see us any time should she have an urgency and unable to get an appointment, and that we use [facility].      I have discussed the diagnosis with the patient and the intended plan as seen in the  orders above. The patient understands and agees with the plan. The patient has   received an after visit summary and questions were answered concerning  future plans  Patient labs and/or xrays were reviewed  Past records were reviewed. PLAN:  .  Orders Placed This Encounter    Influenza virus vaccine (QUADRIVALENT PRES FREE SYRINGE) IM (35792)    URINALYSIS W/ RFLX MICROSCOPIC    CBC WITH AUTOMATED DIFF    METABOLIC PANEL, COMPREHENSIVE    LIPID PANEL    TSH 3RD GENERATION    HEMOGLOBIN A1C WITH EAG    REFERRAL TO GENERAL SURGERY       Follow-up Disposition:  Return in about 1 year (around 12/20/2019), or if symptoms worsen or fail to improve.            ATTENTION:   This medical record was transcribed using an electronic medical records system. Although proofread, it may and can contain electronic and spelling errors. Other human spelling and other errors may be present. Corrections may be executed at a later time. Please feel free to contact us for any clarifications as needed.

## 2018-12-20 NOTE — PROGRESS NOTES
1. Have you been to the ER, urgent care clinic since your last visit? Hospitalized since your last visit? No    2. Have you seen or consulted any other health care providers outside of the [address] [place] [address] since your last visit? Include any pap smears or colon screening.  No

## 2018-12-21 LAB
ALBUMIN SERPL-MCNC: 4.2 G/DL (ref 3.5–5.5)
ALBUMIN/GLOB SERPL: 1.5 {RATIO} (ref 1.2–2.2)
ALP SERPL-CCNC: 61 IU/L (ref 39–117)
ALT SERPL-CCNC: 13 IU/L (ref 0–32)
APPEARANCE UR: CLEAR
AST SERPL-CCNC: 13 IU/L (ref 0–40)
BASOPHILS # BLD AUTO: 0 X10E3/UL (ref 0–0.2)
BASOPHILS NFR BLD AUTO: 1 %
BILIRUB SERPL-MCNC: 0.2 MG/DL (ref 0–1.2)
BILIRUB UR QL STRIP: NEGATIVE
BUN SERPL-MCNC: 14 MG/DL (ref 6–24)
BUN/CREAT SERPL: 20 (ref 9–23)
CALCIUM SERPL-MCNC: 10 MG/DL (ref 8.7–10.2)
CHLORIDE SERPL-SCNC: 106 MMOL/L (ref 96–106)
CHOLEST SERPL-MCNC: 152 MG/DL (ref 100–199)
CO2 SERPL-SCNC: 29 MMOL/L (ref 20–29)
COLOR UR: YELLOW
CREAT SERPL-MCNC: 0.69 MG/DL (ref 0.57–1)
EOSINOPHIL # BLD AUTO: 0.1 X10E3/UL (ref 0–0.4)
EOSINOPHIL NFR BLD AUTO: 3 %
ERYTHROCYTE [DISTWIDTH] IN BLOOD BY AUTOMATED COUNT: 15.1 % (ref 12.3–15.4)
EST. AVERAGE GLUCOSE BLD GHB EST-MCNC: 123 MG/DL
GLOBULIN SER CALC-MCNC: 2.8 G/DL (ref 1.5–4.5)
GLUCOSE SERPL-MCNC: 78 MG/DL (ref 65–99)
GLUCOSE UR QL: NEGATIVE
HBA1C MFR BLD: 5.9 % (ref 4.8–5.6)
HCT VFR BLD AUTO: 39.9 % (ref 34–46.6)
HDLC SERPL-MCNC: 51 MG/DL
HGB BLD-MCNC: 13.1 G/DL (ref 11.1–15.9)
HGB UR QL STRIP: NEGATIVE
IMM GRANULOCYTES # BLD: 0 X10E3/UL (ref 0–0.1)
IMM GRANULOCYTES NFR BLD: 0 %
KETONES UR QL STRIP: NEGATIVE
LDLC SERPL CALC-MCNC: 76 MG/DL (ref 0–99)
LEUKOCYTE ESTERASE UR QL STRIP: NEGATIVE
LYMPHOCYTES # BLD AUTO: 1.7 X10E3/UL (ref 0.7–3.1)
LYMPHOCYTES NFR BLD AUTO: 38 %
MCH RBC QN AUTO: 26.1 PG (ref 26.6–33)
MCHC RBC AUTO-ENTMCNC: 32.8 G/DL (ref 31.5–35.7)
MCV RBC AUTO: 80 FL (ref 79–97)
MICRO URNS: NORMAL
MONOCYTES # BLD AUTO: 0.4 X10E3/UL (ref 0.1–0.9)
MONOCYTES NFR BLD AUTO: 8 %
NEUTROPHILS # BLD AUTO: 2.2 X10E3/UL (ref 1.4–7)
NEUTROPHILS NFR BLD AUTO: 50 %
NITRITE UR QL STRIP: NEGATIVE
PH UR STRIP: 7 [PH] (ref 5–7.5)
PLATELET # BLD AUTO: 302 X10E3/UL (ref 150–379)
POTASSIUM SERPL-SCNC: 3.8 MMOL/L (ref 3.5–5.2)
PROT SERPL-MCNC: 7 G/DL (ref 6–8.5)
PROT UR QL STRIP: NEGATIVE
RBC # BLD AUTO: 5.01 X10E6/UL (ref 3.77–5.28)
SODIUM SERPL-SCNC: 147 MMOL/L (ref 134–144)
SP GR UR: 1.01 (ref 1–1.03)
TRIGL SERPL-MCNC: 127 MG/DL (ref 0–149)
TSH SERPL DL<=0.005 MIU/L-ACNC: 3.27 UIU/ML (ref 0.45–4.5)
UROBILINOGEN UR STRIP-MCNC: 0.2 MG/DL (ref 0.2–1)
VLDLC SERPL CALC-MCNC: 25 MG/DL (ref 5–40)
WBC # BLD AUTO: 4.4 X10E3/UL (ref 3.4–10.8)

## 2019-01-02 ENCOUNTER — OFFICE VISIT (OUTPATIENT)
Dept: SURGERY | Age: 59
End: 2019-01-02

## 2019-01-02 VITALS
DIASTOLIC BLOOD PRESSURE: 88 MMHG | RESPIRATION RATE: 18 BRPM | HEART RATE: 71 BPM | WEIGHT: 237 LBS | SYSTOLIC BLOOD PRESSURE: 130 MMHG | TEMPERATURE: 97.7 F | HEIGHT: 66 IN | BODY MASS INDEX: 38.09 KG/M2 | OXYGEN SATURATION: 97 %

## 2019-01-02 DIAGNOSIS — E04.1 RIGHT THYROID NODULE: Primary | ICD-10-CM

## 2019-01-02 NOTE — PROGRESS NOTES
Subjective:  
  
Samantha Bcaon  is a 63 y.o.  female who presents for evaluation of right thyroid nodule. Pt states that she he previously had an ultrasound of her thyroid approximately 1 year ago along with a needle biopsy with [facility] which confirmed it was benign. Since then, her PCP believes it has gotten bigger. Past Medical History:  
Diagnosis Date  Arthritis  Encounter for Hemoccult screening 01/24/2017  
 neg  Hot flash, menopausal 2015  Hypertension 2000  Menopause  Obesity 2000  Pelvic mass in female  Prediabetes 12/29/17  Right thyroid nodule 01/17/2018 Atypia of undetermined significance - repeat recommended  S/P colonoscopy 11/2015  
 [place] Past Surgical History:  
Procedure Laterality Date  HX GYN    
 HX ORTHOPAEDIC Social History Tobacco Use  Smoking status: Never Smoker  Smokeless tobacco: Never Used Substance Use Topics  Alcohol use: No  
 
 
Family History Problem Relation Age of Onset  Breast Cancer Maternal Grandmother  Breast Cancer Maternal Aunt Current Outpatient Medications on File Prior to Visit Medication Sig Dispense Refill  PARoxetine (PAXIL) 10 mg tablet TAKE 1 TAB BY MOUTH DAILY. 30 Tab 3  
 amLODIPine (NORVASC) 10 mg tablet TAKE 0.5 TABS BY MOUTH DAILY. 90 Tab 4  
 hydroCHLOROthiazide (HYDRODIURIL) 12.5 mg tablet TAKE 1 TAB BY MOUTH DAILY. 90 Tab 3  
 black cohosh 20 mg tab Take 40 mg by mouth daily. 30 Tab 11 No current facility-administered medications on file prior to visit. No Known Allergies Review of Systems:   
Pertinent items are noted in the History of Present Illness. Objective:  
 
Visit Vitals /88 (BP 1 Location: Left arm, BP Patient Position: Sitting) Pulse 71 Temp 97.7 °F (36.5 °C) (Oral) Resp 18 Ht 5' 6\" (1.676 m) Wt 237 lb (107.5 kg) SpO2 97% BMI 38.25 kg/m² Physical Exam: GENERAL: alert, cooperative, no distress, appears stated age NECK: the right lobe of the thyroid feels slightly larger than the left, but I cannot appreciate any nodules, there is no cervical adenopathy. Labs: No results found for this or any previous visit (from the past 24 hour(s)). Data Review:  US guided fine needle biopsy with [facility] (1/10/2018) Assessment and Plan: ICD-10-CM ICD-9-CM 1. Right thyroid nodule E04.1 241.0 I would recommend she have another ultrasound for comparison to the ultrasound from 1 year ago and if the lesion is any larger, we will repeat the needle biopsy. This document was scribed by Jennifer Diaz as dictated by Dr. Demarcus Marie. Signed By: Chao Cunningham MD   
 01/02/19

## 2019-01-02 NOTE — PROGRESS NOTES
1. Have you been to the ER, urgent care clinic since your last visit? Hospitalized since your last visit? No 
 
2. Have you seen or consulted any other health care providers outside of the [address] [place] [address] since your last visit? Include any pap smears or colon screening.  No

## 2019-01-12 ENCOUNTER — HOSPITAL ENCOUNTER (OUTPATIENT)
Dept: ULTRASOUND IMAGING | Age: 59
Discharge: HOME | End: 2019-01-12
Attending: SURGERY
Payer: COMMERCIAL

## 2019-01-12 DIAGNOSIS — E04.1 RIGHT THYROID NODULE: ICD-10-CM

## 2019-01-12 PROCEDURE — 76536 US EXAM OF HEAD AND NECK: CPT

## 2019-01-16 RX ORDER — HYDROCHLOROTHIAZIDE 12.5 MG/1
TABLET ORAL
Qty: 90 TAB | Refills: 3 | Status: SHIPPED | OUTPATIENT
Start: 2019-01-16 | End: 2019-12-11 | Stop reason: SDUPTHER

## 2019-03-04 ENCOUNTER — CLINICAL SUPPORT (OUTPATIENT)
Dept: INTERNAL MEDICINE CLINIC | Age: 59
End: 2019-03-04

## 2019-03-04 DIAGNOSIS — I10 ESSENTIAL HYPERTENSION: Primary | ICD-10-CM

## 2019-03-06 LAB
GAMMA INTERFERON BACKGROUND BLD IA-ACNC: 0.02 IU/ML
M TB IFN-G BLD-IMP: ABNORMAL
M TB IFN-G CD4+ BCKGRND COR BLD-ACNC: 0.03 IU/ML
MITOGEN IGNF BLD-ACNC: 0.26 IU/ML
QUANTIFERON TB2 AG: 0.02 IU/ML
SERVICE CMNT-IMP: ABNORMAL

## 2019-03-11 DIAGNOSIS — Z23 ENCOUNTER FOR IMMUNIZATION: ICD-10-CM

## 2019-03-11 DIAGNOSIS — Z11.1 SCREENING EXAMINATION FOR PULMONARY TUBERCULOSIS: ICD-10-CM

## 2019-03-13 ENCOUNTER — CLINICAL SUPPORT (OUTPATIENT)
Dept: INTERNAL MEDICINE CLINIC | Age: 59
End: 2019-03-13

## 2019-03-13 DIAGNOSIS — Z23 ENCOUNTER FOR IMMUNIZATION: Primary | ICD-10-CM

## 2019-03-13 LAB
MM INDURATION POC: NORMAL MM (ref 0–5)
PPD POC: NEGATIVE NEGATIVE

## 2019-03-18 DIAGNOSIS — M17.0 PRIMARY OSTEOARTHRITIS OF BOTH KNEES: Primary | ICD-10-CM

## 2019-04-08 RX ORDER — AMLODIPINE BESYLATE 10 MG/1
TABLET ORAL
Qty: 90 TAB | Refills: 0 | Status: SHIPPED | OUTPATIENT
Start: 2019-04-08 | End: 2019-10-02 | Stop reason: SDUPTHER

## 2019-04-13 RX ORDER — PAROXETINE 10 MG/1
TABLET, FILM COATED ORAL
Qty: 30 TAB | Refills: 3 | Status: SHIPPED | OUTPATIENT
Start: 2019-04-13 | End: 2019-08-03 | Stop reason: SDUPTHER

## 2019-07-18 ENCOUNTER — OFFICE VISIT (OUTPATIENT)
Dept: INTERNAL MEDICINE CLINIC | Age: 59
End: 2019-07-18

## 2019-07-18 VITALS
RESPIRATION RATE: 20 BRPM | HEART RATE: 86 BPM | WEIGHT: 231.6 LBS | OXYGEN SATURATION: 96 % | HEIGHT: 66 IN | TEMPERATURE: 97.8 F | SYSTOLIC BLOOD PRESSURE: 130 MMHG | BODY MASS INDEX: 37.22 KG/M2 | DIASTOLIC BLOOD PRESSURE: 83 MMHG

## 2019-07-18 DIAGNOSIS — M19.90 ARTHRITIS: ICD-10-CM

## 2019-07-18 DIAGNOSIS — R06.02 SOB (SHORTNESS OF BREATH): ICD-10-CM

## 2019-07-18 DIAGNOSIS — R19.00 PELVIC MASS IN FEMALE: ICD-10-CM

## 2019-07-18 DIAGNOSIS — R73.02 IGT (IMPAIRED GLUCOSE TOLERANCE): ICD-10-CM

## 2019-07-18 DIAGNOSIS — N95.1 HOT FLASH, MENOPAUSAL: ICD-10-CM

## 2019-07-18 DIAGNOSIS — E66.01 SEVERE OBESITY WITH BODY MASS INDEX (BMI) OF 35.0 TO 39.9 WITH SERIOUS COMORBIDITY (HCC): ICD-10-CM

## 2019-07-18 DIAGNOSIS — I10 ESSENTIAL HYPERTENSION: Primary | ICD-10-CM

## 2019-07-18 DIAGNOSIS — R73.03 PREDIABETES: ICD-10-CM

## 2019-07-18 NOTE — PROGRESS NOTES
1. Have you been to the ER, urgent care clinic since your last visit? Hospitalized since your last visit? No    2. Have you seen or consulted any other health care providers outside of the [address] [place] [address] since your last visit? Include any pap smears or colon screening.  No

## 2019-07-18 NOTE — PROGRESS NOTES
SPORTS MEDICINE AND PRIMARY CARE  Peewee Ferris MD, [address] [address]  Phone:  [phone]  Fax: [phone]       Chief Complaint   Patient presents with    Hypertension   . SUBJECTIVE:    Abhishek Delvalle is a 63 y.o. female Patient returns today with a known history of hypertension, menopausal hot flashes, obesity, thyroid nodules, prediabetes, and is seen for evaluation. Patient returns today with information from [address] [place] [address], where she had her colonoscopy at [address] [place] [address],[address], [address]. The fax number is [phone]. Will send a note to ask them to fax the colonoscopy. Since we last saw her, Dr. Precious Castellano has planned for a total knee replacement on the left on 10/07/19. She is losing weight in an effort to proceed with the procedure. We will begin our evaluation with an echocardiogram, repeat EKG today since she has not had one for three years, as we go forward preparing for her procedure. Other new complaints denied. Patient is seen for evaluation. Current Outpatient Medications   Medication Sig Dispense Refill    amLODIPine (NORVASC) 10 mg tablet TAKE 0.5 TABS BY MOUTH DAILY. 90 Tab 0    hydroCHLOROthiazide (HYDRODIURIL) 12.5 mg tablet TAKE 1 TAB BY MOUTH DAILY. 90 Tab 3    black cohosh 20 mg tab Take 40 mg by mouth daily. 30 Tab 11    PARoxetine (PAXIL) 10 mg tablet TAKE 1 TAB BY MOUTH DAILY.  27 Tab 3     Past Medical History:   Diagnosis Date    Arthritis     Encounter for Hemoccult screening 01/24/2017    neg    Hot flash, menopausal 2015    Hypertension 2000    Menopause     Obesity 2000    Pelvic mass in female     Prediabetes 12/29/17    Right thyroid nodule 01/17/2018    Atypia of undetermined significance - pt states dr. Juan Pablo Head said no further eval    [place] 11[place]     Past Surgical History:   Procedure Laterality Date    HX GYN      HX ORTHOPAEDIC       No Known Allergies      REVIEW OF SYSTEMS:  General: negative for - chills or fever  ENT: negative for - headaches, nasal congestion or tinnitus  Respiratory: negative for - cough, hemoptysis, shortness of breath or wheezing  Cardiovascular : negative for - chest pain, edema, palpitations or shortness of breath  Gastrointestinal: negative for - abdominal pain, blood in stools, heartburn or nausea/vomiting  Genito-Urinary: no dysuria, trouble voiding, or hematuria  Musculoskeletal: negative for - gait disturbance, joint pain, joint stiffness or joint swelling  Neurological: no TIA or stroke symptoms  Hematologic: no bruises, no bleeding, no swollen glands  Integument: no lumps, mole changes, nail changes or rash  Endocrine: no malaise/lethargy or unexpected weight changes      Social History     Socioeconomic History    Marital status:      Spouse name: Not on file    Number of children: Not on file    Years of education: Not on file    Highest education level: Not on file   Tobacco Use    Smoking status: Never Smoker    Smokeless tobacco: Never Used   Substance and Sexual Activity    Alcohol use: No    Drug use: No    Sexual activity: Yes     Partners: Male   Social History Narrative    Habits: There is no history of smoking, alcohol abuse or drug abuse.           Social History:  The patient is  and lives with her . The patient is the mother of 29 25and 39year-old children and three grandchildren. 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 Her Mormon background is Confucianism.          Family History:  Father  at 79 of a sickness. Mother  of complications of alcoholism. She has eight siblings.      Family History   Problem Relation Age of Onset    Breast Cancer Maternal Grandmother     Breast Cancer Maternal Aunt        OBJECTIVE:    Visit Vitals  /83   Pulse 86   Temp 97.8 °F (36.6 °C) (Oral)   Resp 20   Ht 5' 6\" (1.676 m)   Wt 231 lb 9.6 oz (105.1 kg) SpO2 96%   BMI 37.38 kg/m²     CONSTITUTIONAL: well , well nourished, appears age appropriate  EYES: perrla, eom intact  ENMT:moist mucous membranes, pharynx clear  NECK: supple. Thyroid normal  RESPIRATORY: Chest: clear bilaterally   CARDIOVASCULAR: Heart: regular rate and rhythm  GASTROINTESTINAL: Abdomen: soft, bowel sounds active  HEMATOLOGIC: no pathological lymph nodes palpated  MUSCULOSKELETAL: Extremities: no edema, pulse 1+   INTEGUMENT: No unusual rashes or suspicious skin lesions noted. Nails appear normal.  NEUROLOGIC: non-focal exam   MENTAL STATUS: alert and oriented, appropriate affect           ASSESSMENT:  1. Essential hypertension    2. Prediabetes    3. Severe obesity with body mass index (BMI) of 35.0 to 39.9 with serious comorbidity (HCC)    4. Pelvic mass in female    5. Hot flash, menopausal    6. Arthritis    7. SOB (shortness of breath)    8. IGT (impaired glucose tolerance)      Patient has advancing DJD, particularly of the left knee. She is preparing herself physically by losing weight and staying active for a procedure on 10/07/19. As noted above, will go forward with evaluation, including EKG, echocardiogram and appropriate laboratory studies, anticipating that she will have preoperative laboratory studies just prior to the procedure and request medical clearance. Depending on the results will make further recommendations. Blood pressure control is at goal.  We congratulate her. Her BMI reflects a 6 pound weight loss since we last saw her. She lost more, but had a wonderful time on the 4th of July and has to get back on the bandwagon. We are also watching prediabetes to be sure hemoglobin A1c is not creeping upward. She will return to see us the last of September. I have discussed the diagnosis with the patient and the intended plan as seen in the  orders above. The patient understands and agees with the plan.   The patient has   received an after visit summary and questions were answered concerning  future plans  Patient labs and/or xrays were reviewed  Past records were reviewed. PLAN:  .  Orders Placed This Encounter    URINALYSIS W/ RFLX MICROSCOPIC    CBC WITH AUTOMATED DIFF    METABOLIC PANEL, COMPREHENSIVE    BNP    HEMOGLOBIN A1C WITH EAG    AMB POC EKG ROUTINE W/ 12 LEADS, INTER & REP       Follow-up and Dispositions    · Return in about 2 months (around 9/18/2019). ATTENTION:   This medical record was transcribed using an electronic medical records system. Although proofread, it may and can contain electronic and spelling errors. Other human spelling and other errors may be present. Corrections may be executed at a later time. Please feel free to contact us for any clarifications as needed.

## 2019-07-19 LAB
ALBUMIN SERPL-MCNC: 4 G/DL (ref 3.5–5.5)
ALBUMIN/GLOB SERPL: 1.3 {RATIO} (ref 1.2–2.2)
ALP SERPL-CCNC: 64 IU/L (ref 39–117)
ALT SERPL-CCNC: 13 IU/L (ref 0–32)
APPEARANCE UR: CLEAR
AST SERPL-CCNC: 14 IU/L (ref 0–40)
BASOPHILS # BLD AUTO: 0 X10E3/UL (ref 0–0.2)
BASOPHILS NFR BLD AUTO: 1 %
BILIRUB SERPL-MCNC: 0.3 MG/DL (ref 0–1.2)
BILIRUB UR QL STRIP: NEGATIVE
BUN SERPL-MCNC: 18 MG/DL (ref 6–24)
BUN/CREAT SERPL: 22 (ref 9–23)
CALCIUM SERPL-MCNC: 10 MG/DL (ref 8.7–10.2)
CHLORIDE SERPL-SCNC: 105 MMOL/L (ref 96–106)
CO2 SERPL-SCNC: 28 MMOL/L (ref 20–29)
COLOR UR: YELLOW
CREAT SERPL-MCNC: 0.83 MG/DL (ref 0.57–1)
EOSINOPHIL # BLD AUTO: 0.2 X10E3/UL (ref 0–0.4)
EOSINOPHIL NFR BLD AUTO: 4 %
ERYTHROCYTE [DISTWIDTH] IN BLOOD BY AUTOMATED COUNT: 14.3 % (ref 12.3–15.4)
EST. AVERAGE GLUCOSE BLD GHB EST-MCNC: 126 MG/DL
GLOBULIN SER CALC-MCNC: 3 G/DL (ref 1.5–4.5)
GLUCOSE SERPL-MCNC: 73 MG/DL (ref 65–99)
GLUCOSE UR QL: NEGATIVE
HBA1C MFR BLD: 6 % (ref 4.8–5.6)
HCT VFR BLD AUTO: 43 % (ref 34–46.6)
HGB BLD-MCNC: 13.3 G/DL (ref 11.1–15.9)
HGB UR QL STRIP: NEGATIVE
IMM GRANULOCYTES # BLD AUTO: 0 X10E3/UL (ref 0–0.1)
IMM GRANULOCYTES NFR BLD AUTO: 0 %
KETONES UR QL STRIP: NEGATIVE
LEUKOCYTE ESTERASE UR QL STRIP: NEGATIVE
LYMPHOCYTES # BLD AUTO: 1.5 X10E3/UL (ref 0.7–3.1)
LYMPHOCYTES NFR BLD AUTO: 34 %
MCH RBC QN AUTO: 25.5 PG (ref 26.6–33)
MCHC RBC AUTO-ENTMCNC: 30.9 G/DL (ref 31.5–35.7)
MCV RBC AUTO: 82 FL (ref 79–97)
MICRO URNS: NORMAL
MONOCYTES # BLD AUTO: 0.5 X10E3/UL (ref 0.1–0.9)
MONOCYTES NFR BLD AUTO: 12 %
NEUTROPHILS # BLD AUTO: 2.1 X10E3/UL (ref 1.4–7)
NEUTROPHILS NFR BLD AUTO: 49 %
NITRITE UR QL STRIP: NEGATIVE
PH UR STRIP: 6 [PH] (ref 5–7.5)
PLATELET # BLD AUTO: 351 X10E3/UL (ref 150–450)
POTASSIUM SERPL-SCNC: 4.5 MMOL/L (ref 3.5–5.2)
PROT SERPL-MCNC: 7 G/DL (ref 6–8.5)
PROT UR QL STRIP: NEGATIVE
RBC # BLD AUTO: 5.22 X10E6/UL (ref 3.77–5.28)
SODIUM SERPL-SCNC: 143 MMOL/L (ref 134–144)
SP GR UR: 1.01 (ref 1–1.03)
UROBILINOGEN UR STRIP-MCNC: 0.2 MG/DL (ref 0.2–1)
WBC # BLD AUTO: 4.3 X10E3/UL (ref 3.4–10.8)

## 2019-07-20 LAB — BNP SERPL-MCNC: 25.4 PG/ML (ref 0–100)

## 2019-08-03 RX ORDER — PAROXETINE 10 MG/1
TABLET, FILM COATED ORAL
Qty: 30 TAB | Refills: 3 | Status: SHIPPED | OUTPATIENT
Start: 2019-08-03 | End: 2019-12-17 | Stop reason: SDUPTHER

## 2019-09-23 ENCOUNTER — OFFICE VISIT (OUTPATIENT)
Dept: INTERNAL MEDICINE CLINIC | Age: 59
End: 2019-09-23

## 2019-09-23 VITALS
TEMPERATURE: 97.9 F | HEIGHT: 66 IN | RESPIRATION RATE: 18 BRPM | HEART RATE: 90 BPM | SYSTOLIC BLOOD PRESSURE: 111 MMHG | OXYGEN SATURATION: 98 % | DIASTOLIC BLOOD PRESSURE: 75 MMHG | WEIGHT: 233.5 LBS | BODY MASS INDEX: 37.52 KG/M2

## 2019-09-23 DIAGNOSIS — M19.90 ARTHRITIS: ICD-10-CM

## 2019-09-23 DIAGNOSIS — Z23 ENCOUNTER FOR IMMUNIZATION: Primary | ICD-10-CM

## 2019-09-23 DIAGNOSIS — E66.09 CLASS 2 OBESITY DUE TO EXCESS CALORIES WITHOUT SERIOUS COMORBIDITY WITH BODY MASS INDEX (BMI) OF 37.0 TO 37.9 IN ADULT: ICD-10-CM

## 2019-09-23 DIAGNOSIS — E04.1 RIGHT THYROID NODULE: ICD-10-CM

## 2019-09-23 DIAGNOSIS — R73.03 PREDIABETES: ICD-10-CM

## 2019-09-23 DIAGNOSIS — I10 ESSENTIAL HYPERTENSION: ICD-10-CM

## 2019-09-23 NOTE — PROGRESS NOTES
SPORTS MEDICINE AND PRIMARY CARE  Tamica Bonds MD, [address] [address]  Phone:  [phone]  Fax: [phone]       Chief Complaint   Patient presents with    Hypertension   . SUBJECTIVE:    Rashaad Brito is a 63 y.o. female Patient returns today with known history of primary hypertension, DJD, obesity, prediabetes, right thyroid nodule, for which atypia of undetermined significance and repeat was recommended and patient has declined to have another biopsy. This was done about two years ago. She saw Dr. Pieter Castro, who recommended no further evaluation about her thyroid. Dr. Charmayne Chamber plans to do a total left knee replacement on 10/07/19 and patient comes in for evaluation. No paperwork is provided. We will therefore make available in [facility] this progress note, which will serve as her preop medical clearance. Current Outpatient Medications   Medication Sig Dispense Refill    PARoxetine (PAXIL) 10 mg tablet TAKE 1 TAB BY MOUTH DAILY. 30 Tab 3    amLODIPine (NORVASC) 10 mg tablet TAKE 0.5 TABS BY MOUTH DAILY. 90 Tab 0    hydroCHLOROthiazide (HYDRODIURIL) 12.5 mg tablet TAKE 1 TAB BY MOUTH DAILY. 90 Tab 3    black cohosh 20 mg tab Take 40 mg by mouth daily.  27 Tab 11     Past Medical History:   Diagnosis Date    Arthritis     Encounter for Hemoccult screening 01/24/2017    neg    Hot flash, menopausal 2015    Hypertension 2000    Menopause     Obesity 2000    Pelvic mass in female     Prediabetes 12/29/17    Right thyroid nodule 01/17/2018    Atypia of undetermined significance - pt states dr. Mullins Man said no further eval    [place] 11[place]2015    [place]     Past Surgical History:   Procedure Laterality Date    HX GYN      HX ORTHOPAEDIC       No Known Allergies      REVIEW OF SYSTEMS:  General: negative for - chills or fever  ENT: negative for - headaches, nasal congestion or tinnitus  Respiratory: negative for - cough, hemoptysis, shortness of breath or wheezing  Cardiovascular : negative for - chest pain, edema, palpitations or shortness of breath  Gastrointestinal: negative for - abdominal pain, blood in stools, heartburn or nausea/vomiting  Genito-Urinary: no dysuria, trouble voiding, or hematuria  Musculoskeletal: negative for - gait disturbance, joint pain, joint stiffness or joint swelling  Neurological: no TIA or stroke symptoms  Hematologic: no bruises, no bleeding, no swollen glands  Integument: no lumps, mole changes, nail changes or rash  Endocrine: no malaise/lethargy or unexpected weight changes      Social History     Socioeconomic History    Marital status:      Spouse name: Not on file    Number of children: Not on file    Years of education: Not on file    Highest education level: Not on file   Tobacco Use    Smoking status: Never Smoker    Smokeless tobacco: Never Used   Substance and Sexual Activity    Alcohol use: No    Drug use: No    Sexual activity: Yes     Partners: Male   Social History Narrative    Habits: There is no history of smoking, alcohol abuse or drug abuse.           Social History:  The patient is  and lives with her . The patient is the mother of 29 25and 39year-old children and three grandchildren. 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 Her Jewish background is Yazdanism.          Family History:  Father  at 79 of a sickness. Mother  of complications of alcoholism. She has eight siblings.      Family History   Problem Relation Age of Onset    Breast Cancer Maternal Grandmother     Breast Cancer Maternal Aunt        OBJECTIVE:    Visit Vitals  /75   Pulse 90   Temp 97.9 °F (36.6 °C) (Oral)   Resp 18   Ht 5' 6\" (1.676 m)   Wt 233 lb 8 oz (105.9 kg)   SpO2 98%   BMI 37.69 kg/m²     CONSTITUTIONAL: well , well nourished, appears age appropriate  EYES: perrla, eom intact  ENMT:moist mucous membranes, pharynx clear  NECK: supple. Thyroid normal  RESPIRATORY: Chest: clear bilaterally   CARDIOVASCULAR: Heart: regular rate and rhythm  GASTROINTESTINAL: Abdomen: soft, bowel sounds active  HEMATOLOGIC: no pathological lymph nodes palpated  MUSCULOSKELETAL: Extremities: no edema, pulse 1+   INTEGUMENT: No unusual rashes or suspicious skin lesions noted. Nails appear normal.  NEUROLOGIC: non-focal exam   MENTAL STATUS: alert and oriented, appropriate affect           ASSESSMENT:  1. Encounter for immunization    2. Essential hypertension    3. Arthritis    4. Class 2 obesity due to excess calories without serious comorbidity with body mass index (BMI) of 37.0 to 37.9 in adult    5. Prediabetes    6. Right thyroid nodule      Patient received immunization shot today. BP control is at goal.    DJD of the knee is the reason for her TKR, for which we concur. She has a known history of obesity. Fortunately her BMI is less than 40. But it also reflects another 2 pound weight gain since we last saw her, which is completely against her if she is going to have knee surgery. We encourage her to decrease her caloric intake. Known history of prediabetes, but hemoglobin A1c was 6 on the last visit in July. As discussed above, the right thyroid nodule is stable. EKG is acceptable with an EKG performed on 06/18/19, which revealed sinus rhythm and was otherwise unremarkable. She has no chest pain, no history of cardiac disease. Lab studies from July are unremarkable. Patient's medical status is stable from the planned surgical procedure. We will see her briefly while in the hospital if she has any medical issues. Patient is cleared for surgery. I have discussed the diagnosis with the patient and the intended plan as seen in the  orders above. The patient understands and agees with the plan.   The patient has   received an after visit summary and questions were answered concerning  future plans  Patient labs and/or xrays were reviewed  Past records were reviewed. PLAN:  .  Orders Placed This Encounter    Influenza virus vaccine (QUADRIVALENT PRES FREE SYRINGE) IM ([id number])       Follow-up and Dispositions    · Return in about 3 months (around 12/23/2019). ATTENTION:   This medical record was transcribed using an electronic medical records system. Although proofread, it may and can contain electronic and spelling errors. Other human spelling and other errors may be present. Corrections may be executed at a later time. Please feel free to contact us for any clarifications as needed.

## 2019-09-23 NOTE — PROGRESS NOTES
1. Have you been to the ER, urgent care clinic since your last visit? Hospitalized since your last visit? No    2. Have you seen or consulted any other health care providers outside of the [address] [place] [address] since your last visit? Include any pap smears or colon screening.  No     Wants to discuss up coming knee surgery

## 2019-09-23 NOTE — PATIENT INSTRUCTIONS
Vaccine Information Statement Influenza (Flu) Vaccine (Inactivated or Recombinant): What You Need to Know Many Vaccine Information Statements are available in Occitan and other languages. See www.immunize.org/vis Hojas de información sobre vacunas están disponibles en español y en muchos otros idiomas. Visite www.immunize.org/vis 1. Why get vaccinated? Influenza vaccine can prevent influenza (flu). Flu is a contagious disease that spreads around the [place] every year, usually between October and May. Anyone can get the flu, but it is more dangerous for some people. Infants and young children, people 72years of age and older, pregnant women, and people with certain health conditions or a weakened immune system are at greatest risk of flu complications. Pneumonia, bronchitis, sinus infections and ear infections are examples of flu-related complications. If you have a medical condition, such as heart disease, cancer or diabetes, flu can make it worse. Flu can cause fever and chills, sore throat, muscle aches, fatigue, cough, headache, and runny or stuffy nose. Some people may have vomiting and diarrhea, though this is more common in children than adults. Each year thousands of people in the [facility] die from flu, and many more are hospitalized. Flu vaccine prevents millions of illnesses and flu-related visits to the doctor each year. 2. Influenza vaccines CDC recommends everyone 10months of age and older get vaccinated every flu season. Children 6 months through 6years of age may need 2 doses during a single flu season. Everyone else needs only 1 dose each flu season. It takes about 2 weeks for protection to develop after vaccination. There are many flu viruses, and they are always changing. Each year a new flu vaccine is made to protect against three or four viruses that are likely to cause disease in the upcoming flu season.  Even when the vaccine doesnt exactly match these viruses, it may still provide some protection. Influenza vaccine does not cause flu. Influenza vaccine may be given at the same time as other vaccines. 3. Talk with your health care provider Tell your vaccine provider if the person getting the vaccine: 
 Has had an allergic reaction after a previous dose of influenza vaccine, or has any severe, life-threatening allergies.  Has ever had Guillain-Barré Syndrome (also called GBS). In some cases, your health care provider may decide to postpone influenza vaccination to a future visit. People with minor illnesses, such as a cold, may be vaccinated. People who are moderately or severely ill should usually wait until they recover before getting influenza vaccine. Your health care provider can give you more information. 4. Risks of a reaction  Soreness, redness, and swelling where shot is given, fever, muscle aches, and headache can happen after influenza vaccine.  There may be a very small increased risk of Guillain-Barré Syndrome (GBS) after inactivated influenza vaccine (the flu shot). [facility] children who get the flu shot along with pneumococcal vaccine (PCV13), and/or DTaP vaccine at the same time might be slightly more likely to have a seizure caused by fever. Tell your health care provider if a child who is getting flu vaccine has ever had a seizure. People sometimes faint after medical procedures, including vaccination. Tell your provider if you feel dizzy or have vision changes or ringing in the ears. As with any medicine, there is a very remote chance of a vaccine causing a severe allergic reaction, other serious injury, or death. 5. What if there is a serious problem? An allergic reaction could occur after the vaccinated person leaves the clinic.  If you see signs of a severe allergic reaction (hives, swelling of the face and throat, difficulty breathing, a fast heartbeat, dizziness, or weakness), call 9-1-1 and get the person to the nearest hospital. 
 
For other signs that concern you, call your health care provider. Adverse reactions should be reported to the Vaccine Adverse Event Reporting System (VAERS). Your health care provider will usually file this report, or you can do it yourself. Visit the VAERS website at www.vaers. hhs.gov or call 5-263.534.6860. VAERS is only for reporting reactions, and VAERS staff do not give medical advice. 6. The National Vaccine Injury Compensation Program 
 
The [facility] Vaccine Injury Compensation Program (VICP) is a federal program that was created to compensate people who may have been injured by certain vaccines. Visit the VICP website at www.hrsa.gov/vaccinecompensation or call 4-718.307.5178 to learn about the program and about filing a claim. There is a time limit to file a claim for compensation. 7. How can I learn more?  Ask your health care provider.  Call your local or state health department.  Contact the Centers for Disease Control and Prevention (CDC): 
- Call 5-106.827.6952 (1-800-CDC-INFO) or 
- Visit CDCs influenza website at www.cdc.gov/flu Vaccine Information Statement (Interim) Inactivated Influenza Vaccine 8/15/2019 
[address] 526OA-53 Department of Health and Combined [employer] Centers for Disease Control and Prevention Office Use Only

## 2019-09-30 ENCOUNTER — HOSPITAL ENCOUNTER (OUTPATIENT)
Dept: PREADMISSION TESTING | Age: 59
Discharge: HOME | End: 2019-09-30
Payer: COMMERCIAL

## 2019-09-30 VITALS
BODY MASS INDEX: 36.1 KG/M2 | DIASTOLIC BLOOD PRESSURE: 89 MMHG | RESPIRATION RATE: 20 BRPM | SYSTOLIC BLOOD PRESSURE: 156 MMHG | TEMPERATURE: 98 F | WEIGHT: 230 LBS | HEIGHT: 67 IN | HEART RATE: 90 BPM

## 2019-09-30 LAB
ABO + RH BLD: NORMAL
ANION GAP SERPL CALC-SCNC: 8 MMOL/L (ref 5–15)
APPEARANCE UR: CLEAR
BACTERIA URNS QL MICRO: NEGATIVE /HPF
BILIRUB UR QL: NEGATIVE
BLOOD GROUP ANTIBODIES SERPL: NORMAL
BUN SERPL-MCNC: 23 MG/DL (ref 6–20)
BUN/CREAT SERPL: 29 (ref 12–20)
CALCIUM SERPL-MCNC: 9.2 MG/DL (ref 8.5–10.1)
CHLORIDE SERPL-SCNC: 109 MMOL/L (ref 97–108)
CO2 SERPL-SCNC: 27 MMOL/L (ref 21–32)
COLOR UR: NORMAL
CREAT SERPL-MCNC: 0.78 MG/DL (ref 0.55–1.02)
EPITH CASTS URNS QL MICRO: NORMAL /LPF
ERYTHROCYTE [DISTWIDTH] IN BLOOD BY AUTOMATED COUNT: 14.9 % (ref 11.5–14.5)
EST. AVERAGE GLUCOSE BLD GHB EST-MCNC: 126 MG/DL
GLUCOSE SERPL-MCNC: 95 MG/DL (ref 65–100)
GLUCOSE UR STRIP.AUTO-MCNC: NEGATIVE MG/DL
HBA1C MFR BLD: 6 % (ref 4.2–6.3)
HCT VFR BLD AUTO: 39.8 % (ref 35–47)
HGB BLD-MCNC: 12.8 G/DL (ref 11.5–16)
HGB UR QL STRIP: NEGATIVE
HYALINE CASTS URNS QL MICRO: NORMAL /LPF (ref 0–5)
INR PPP: 1 (ref 0.9–1.1)
KETONES UR QL STRIP.AUTO: NEGATIVE MG/DL
LEUKOCYTE ESTERASE UR QL STRIP.AUTO: NEGATIVE
MCH RBC QN AUTO: 25.6 PG (ref 26–34)
MCHC RBC AUTO-ENTMCNC: 32.2 G/DL (ref 30–36.5)
MCV RBC AUTO: 79.6 FL (ref 80–99)
NITRITE UR QL STRIP.AUTO: NEGATIVE
NRBC # BLD: 0 K/UL (ref 0–0.01)
NRBC BLD-RTO: 0 PER 100 WBC
PH UR STRIP: 5 [PH] (ref 5–8)
PLATELET # BLD AUTO: 303 K/UL (ref 150–400)
PMV BLD AUTO: 10.6 FL (ref 8.9–12.9)
POTASSIUM SERPL-SCNC: 3.5 MMOL/L (ref 3.5–5.1)
PROT UR STRIP-MCNC: NEGATIVE MG/DL
PROTHROMBIN TIME: 10.6 SEC (ref 9–11.1)
RBC # BLD AUTO: 5 M/UL (ref 3.8–5.2)
RBC #/AREA URNS HPF: NORMAL /HPF (ref 0–5)
SODIUM SERPL-SCNC: 144 MMOL/L (ref 136–145)
SP GR UR REFRACTOMETRY: 1.01 (ref 1–1.03)
SPECIMEN EXP DATE BLD: NORMAL
UA: UC IF INDICATED,UAUC: NORMAL
UROBILINOGEN UR QL STRIP.AUTO: 0.2 EU/DL (ref 0.2–1)
WBC # BLD AUTO: 4.5 K/UL (ref 3.6–11)
WBC URNS QL MICRO: NORMAL /HPF (ref 0–4)

## 2019-09-30 PROCEDURE — 80048 BASIC METABOLIC PNL TOTAL CA: CPT

## 2019-09-30 PROCEDURE — 83036 HEMOGLOBIN GLYCOSYLATED A1C: CPT

## 2019-09-30 PROCEDURE — 85027 COMPLETE CBC AUTOMATED: CPT

## 2019-09-30 PROCEDURE — 36415 COLL VENOUS BLD VENIPUNCTURE: CPT

## 2019-09-30 PROCEDURE — 85610 PROTHROMBIN TIME: CPT

## 2019-09-30 PROCEDURE — 86900 BLOOD TYPING SEROLOGIC ABO: CPT

## 2019-09-30 PROCEDURE — 81001 URINALYSIS AUTO W/SCOPE: CPT

## 2019-09-30 RX ORDER — IBUPROFEN 200 MG
600 TABLET ORAL
COMMUNITY
End: 2019-10-08

## 2019-10-01 LAB
BACTERIA SPEC CULT: NORMAL
BACTERIA SPEC CULT: NORMAL
SERVICE CMNT-IMP: NORMAL

## 2019-10-06 NOTE — H&P
Date of Surgery Update:  Gustavo Frankel was seen and examined. Past Medical History:   Diagnosis Date    Arthritis     Encounter for Hemoccult screening 01/24/2017    neg    Hot flash, menopausal 2015    Hypertension 2000    Menopause     Obesity 2000    Pelvic mass in female     Prediabetes 12/29/17    Right thyroid nodule 01/17/2018    Atypia of undetermined significance - pt states dr. Napoleon Randhawa said no further eval    [place] 11/[place]     Prior to Admission Medications   Prescriptions Last Dose Informant Patient Reported? Taking? PARoxetine (PAXIL) 10 mg tablet 10/7/2019 at 0500  No Yes   Sig: TAKE 1 TAB BY MOUTH DAILY. amLODIPine (NORVASC) 10 mg tablet 10/7/2019 at 0500  No Yes   Sig: TAKE 0.5 TABS BY MOUTH DAILY. hydroCHLOROthiazide (HYDRODIURIL) 12.5 mg tablet 10/6/2019 at Unknown time  No Yes   Sig: TAKE 1 TAB BY MOUTH DAILY. ibuprofen (MOTRIN) 200 mg tablet 9/30/2019 at Unknown time  Yes Yes   Sig: Take 600 mg by mouth every eight (8) hours as needed for Pain. Facility-Administered Medications: None      Allergy to:Patient has no known allergies. Physical Examination: General appearance - alert, well appearing, and in no distress  Chest - clear to auscultation, no wheezes, rales or rhonchi, symmetric air entry  Heart - normal rate and regular rhythm  Abdomen - soft, nontender, nondistended, no masses or organomegaly  History and physical has been reviewed. The patient has been examined. There have been no significant clinical changes since the completion of the originally dated History and Physical.    Signed By: Dimple Schmitz MD     October 7, 2019 7:31 AM           Progress Notes          []Hide copied text    []Hover for details  PCP: Sudarshan Jameson MD      Problem List      None        Subjective:    There is no problem list on file for this patient.     Chief Complaint: Follow-up of the Left Knee and Follow-up of the Right Knee     HPI: Thomas Rojas Josias Haas is a 63 y.o. female who returns for follow-up of her bilateral knee pain. She has documented bone-on-bone osteoarthritis of both knees. During her last visit on 04/24/2019, she complained of bilateral knee pain, with her left knee being worse than her right, and requested bilateral cortisone injections in her knees. Since I administered cortisone injections to her bilateral knees a month prior, I did not administer cortisone injections to her bilateral knees. She returns today for repeat cortisone injections. She reports that she still has bilateral knee pain and that her right knee pain is worse than her left. She states her right knee pain interferes with her normal daily activities. She states she has difficulty ambulating for exercise and over long distances due to the pain. She is ambulating with a single point cane today. She has also tried cortisone injections to her bilateral knees without relief. She has failed conservative management and asks about total knee replacement surgery today.      Objective:          Vitals:     06/20/19 0858   BP: (!) 137/95      Height: 5' 7\"       Wt Readings from Last 3 Encounters:   06/20/19 228 lb   04/24/19 228 lb   03/26/19 228 lb      Body mass index is 35.71 kg/m².     Musculoskeletal :Right and Left Knee: She has good range of motion of the bilateral knee. Ligaments intact. No effusion. Positive patellofemoral grind. Negative Johnathan's sign bilaterally. Strong pedal pulses. No pedal edema. Skin healthy and intact.  No apparent atrophy.      Procedures  Large Joint Arthrocentesis: L knee  Consent given by: patient  Timeout: Immediately prior to procedure a time out was called to verify the correct patient, procedure, equipment, support staff and site/side marked as required   Supporting Documentation  Indications: pain   Procedure Details  Location: Knee L knee   Needle size: 22 G  Approach: anterolateral  Patient tolerance: patient tolerated the procedure well with no immediate complications        Medications administered: 1 mL Betamethasone 6 (3-3) MG/ML; 2 mL bupivacaine 0.5 %    Large Joint Arthrocentesis: R knee  Consent given by: patient  Timeout: Immediately prior to procedure a time out was called to verify the correct patient, procedure, equipment, support staff and site/side marked as required   Supporting Documentation  Indications: pain   Procedure Details  Location: Knee R knee   Needle size: 22 G  Approach: anterolateral  Patient tolerance: patient tolerated the procedure well with no immediate complications        Medications administered: 2 mL bupivacaine 0.5 %; 1 mL Betamethasone 6 (3-3) MG/ML             Radiographs:             No imaging obtained    Assessment:      1. Primary osteoarthritis of both knees       Plan:      I reviewed and discussed x-rays taken of the patient's bilateral knees from 3/26/2019 with the patient which show bone-on-bone osteoarthritis of the bilateral knees. I discussed the diagnosis of osteoarthritis of the bilateral knees. We discussed treatment options including continued conservative management vs TKR surgery. I once again explained to the patient that she may ultimately require TKR surgery. We discussed total knee replacement surgery at length with pt including expected outcomes and post-op recovery as well as risks and complications, specifically DVT, PE, infection, and nerve injury. After much discussion, pt desires to hold off on surgery until September. I recommend continued conservative management in the interim including cortisone injections. I administered cortisone injections to her bilateral knees. I also recommend she lose weight in preparation for surgery.   The patient will call us to schedule surgery at her convenience.          Orders Placed This Encounter    Large Joint Arthrocentesis: L knee    Large Joint Arthrocentesis: R knee    BMI >=25 PATIENT INSTRUCTIONS & EDUCATION Return for Scheduled surgery.      Medical documentation was entered by me, [address] [place] [address] for Dr. Hailey Sheth. 6/20/2019     I, Meena Brown MD, personally, performed the services described in this documentation, as scribed in my presence, and it is both accurate and complete.       Electronically signed by Meena Brown MD at 6/20/2019  5:54 PM      Links     Previous Version       Office Visit on 6/20/2019          Revision History         Note shared with patient

## 2019-10-07 ENCOUNTER — HOSPITAL ENCOUNTER (INPATIENT)
Age: 59
LOS: 1 days | Discharge: HOME HEALTH | DRG: 470 | End: 2019-10-08
Attending: ORTHOPAEDIC SURGERY | Admitting: ORTHOPAEDIC SURGERY
Payer: COMMERCIAL

## 2019-10-07 ENCOUNTER — ANESTHESIA EVENT (OUTPATIENT)
Dept: SURGERY | Age: 59
DRG: 470 | End: 2019-10-07
Payer: COMMERCIAL

## 2019-10-07 ENCOUNTER — ANESTHESIA (OUTPATIENT)
Dept: SURGERY | Age: 59
DRG: 470 | End: 2019-10-07
Payer: COMMERCIAL

## 2019-10-07 DIAGNOSIS — M17.12 ARTHRITIS OF KNEE, LEFT: Primary | ICD-10-CM

## 2019-10-07 LAB
GLUCOSE BLD STRIP.AUTO-MCNC: 117 MG/DL (ref 65–100)
SERVICE CMNT-IMP: ABNORMAL

## 2019-10-07 PROCEDURE — 77030027138 HC INCENT SPIROMETER -A

## 2019-10-07 PROCEDURE — 97116 GAIT TRAINING THERAPY: CPT

## 2019-10-07 PROCEDURE — 82962 GLUCOSE BLOOD TEST: CPT

## 2019-10-07 PROCEDURE — 74011250636 HC RX REV CODE- 250/636: Performed by: NURSE ANESTHETIST, CERTIFIED REGISTERED

## 2019-10-07 PROCEDURE — 77030007866 HC KT SPN ANES BBMI -B: Performed by: ANESTHESIOLOGY

## 2019-10-07 PROCEDURE — 77030008462 HC STPLR SKN PROX J&J -A: Performed by: ORTHOPAEDIC SURGERY

## 2019-10-07 PROCEDURE — 74011250637 HC RX REV CODE- 250/637: Performed by: ORTHOPAEDIC SURGERY

## 2019-10-07 PROCEDURE — 74011000250 HC RX REV CODE- 250: Performed by: ORTHOPAEDIC SURGERY

## 2019-10-07 PROCEDURE — 74011250636 HC RX REV CODE- 250/636: Performed by: PHYSICIAN ASSISTANT

## 2019-10-07 PROCEDURE — 74011000250 HC RX REV CODE- 250: Performed by: NURSE ANESTHETIST, CERTIFIED REGISTERED

## 2019-10-07 PROCEDURE — 74011250636 HC RX REV CODE- 250/636: Performed by: ORTHOPAEDIC SURGERY

## 2019-10-07 PROCEDURE — C1713 ANCHOR/SCREW BN/BN,TIS/BN: HCPCS | Performed by: ORTHOPAEDIC SURGERY

## 2019-10-07 PROCEDURE — 65270000029 HC RM PRIVATE

## 2019-10-07 PROCEDURE — 77030003601 HC NDL NRV BLK BBMI -A

## 2019-10-07 PROCEDURE — 74011000258 HC RX REV CODE- 258: Performed by: NURSE ANESTHETIST, CERTIFIED REGISTERED

## 2019-10-07 PROCEDURE — 74011250636 HC RX REV CODE- 250/636: Performed by: ANESTHESIOLOGY

## 2019-10-07 PROCEDURE — 77030014077 HC TOWER MX CEM J&J -C: Performed by: ORTHOPAEDIC SURGERY

## 2019-10-07 PROCEDURE — 77030040361 HC SLV COMPR DVT MDII -B

## 2019-10-07 PROCEDURE — 77030028907 HC WRP KNEE WO BGS SOLM -B

## 2019-10-07 PROCEDURE — 74011000258 HC RX REV CODE- 258: Performed by: ORTHOPAEDIC SURGERY

## 2019-10-07 PROCEDURE — 77030013079 HC BLNKT BAIR HGGR 3M -A: Performed by: ANESTHESIOLOGY

## 2019-10-07 PROCEDURE — 76210000016 HC OR PH I REC 1 TO 1.5 HR: Performed by: ORTHOPAEDIC SURGERY

## 2019-10-07 PROCEDURE — 76010000171 HC OR TIME 2 TO 2.5 HR INTENSV-TIER 1: Performed by: ORTHOPAEDIC SURGERY

## 2019-10-07 PROCEDURE — 77030011640 HC PAD GRND REM COVD -A: Performed by: ORTHOPAEDIC SURGERY

## 2019-10-07 PROCEDURE — 76060000035 HC ANESTHESIA 2 TO 2.5 HR: Performed by: ORTHOPAEDIC SURGERY

## 2019-10-07 PROCEDURE — 0SRD0J9 REPLACEMENT OF LEFT KNEE JOINT WITH SYNTHETIC SUBSTITUTE, CEMENTED, OPEN APPROACH: ICD-10-PCS | Performed by: ORTHOPAEDIC SURGERY

## 2019-10-07 PROCEDURE — 74011250637 HC RX REV CODE- 250/637: Performed by: PHYSICIAN ASSISTANT

## 2019-10-07 PROCEDURE — 97530 THERAPEUTIC ACTIVITIES: CPT

## 2019-10-07 PROCEDURE — 97161 PT EVAL LOW COMPLEX 20 MIN: CPT

## 2019-10-07 PROCEDURE — 77030040922 HC BLNKT HYPOTHRM STRY -A

## 2019-10-07 PROCEDURE — 77030018836 HC SOL IRR NACL ICUM -A: Performed by: ORTHOPAEDIC SURGERY

## 2019-10-07 PROCEDURE — 77030018846 HC SOL IRR STRL H20 ICUM -A: Performed by: ORTHOPAEDIC SURGERY

## 2019-10-07 PROCEDURE — 77030006822 HC BLD SAW SAG BRSM -B: Performed by: ORTHOPAEDIC SURGERY

## 2019-10-07 PROCEDURE — 77030035236 HC SUT PDS STRATFX BARB J&J -B: Performed by: ORTHOPAEDIC SURGERY

## 2019-10-07 PROCEDURE — 77030005513 HC CATH URETH FOL11 MDII -B: Performed by: ORTHOPAEDIC SURGERY

## 2019-10-07 PROCEDURE — C1776 JOINT DEVICE (IMPLANTABLE): HCPCS | Performed by: ORTHOPAEDIC SURGERY

## 2019-10-07 PROCEDURE — 77030031139 HC SUT VCRL2 J&J -A: Performed by: ORTHOPAEDIC SURGERY

## 2019-10-07 PROCEDURE — 77030000032 HC CUF TRNQT ZIMM -B: Performed by: ORTHOPAEDIC SURGERY

## 2019-10-07 DEVICE — IMPLANTABLE DEVICE
Type: IMPLANTABLE DEVICE | Site: KNEE | Status: FUNCTIONAL
Brand: PERSONA® NATURAL TIBIA®

## 2019-10-07 DEVICE — KIT TKR CEM VIT E SURF AND INSTRMT: Type: IMPLANTABLE DEVICE | Status: FUNCTIONAL

## 2019-10-07 DEVICE — IMPLANTABLE DEVICE
Type: IMPLANTABLE DEVICE | Site: KNEE | Status: FUNCTIONAL
Brand: PERSONA® VIVACIT-E®

## 2019-10-07 DEVICE — IMPLANTABLE DEVICE
Type: IMPLANTABLE DEVICE | Site: KNEE | Status: FUNCTIONAL
Brand: PERSONA™

## 2019-10-07 DEVICE — IMPLANTABLE DEVICE
Type: IMPLANTABLE DEVICE | Site: KNEE | Status: FUNCTIONAL
Brand: PERSONA®

## 2019-10-07 DEVICE — SMARTSET HV HIGH VISCOSITY BONE CEMENT 40G
Type: IMPLANTABLE DEVICE | Site: KNEE | Status: FUNCTIONAL
Brand: SMARTSET

## 2019-10-07 RX ORDER — SODIUM CHLORIDE 0.9 % (FLUSH) 0.9 %
5-40 SYRINGE (ML) INJECTION EVERY 8 HOURS
Status: DISCONTINUED | OUTPATIENT
Start: 2019-10-07 | End: 2019-10-08 | Stop reason: HOSPADM

## 2019-10-07 RX ORDER — HYDROCHLOROTHIAZIDE 25 MG/1
12.5 TABLET ORAL DAILY
Status: DISCONTINUED | OUTPATIENT
Start: 2019-10-07 | End: 2019-10-08 | Stop reason: HOSPADM

## 2019-10-07 RX ORDER — SODIUM CHLORIDE 0.9 % (FLUSH) 0.9 %
5-40 SYRINGE (ML) INJECTION AS NEEDED
Status: DISCONTINUED | OUTPATIENT
Start: 2019-10-07 | End: 2019-10-07 | Stop reason: HOSPADM

## 2019-10-07 RX ORDER — SODIUM CHLORIDE 9 MG/ML
125 INJECTION, SOLUTION INTRAVENOUS CONTINUOUS
Status: DISPENSED | OUTPATIENT
Start: 2019-10-07 | End: 2019-10-08

## 2019-10-07 RX ORDER — PREGABALIN 75 MG/1
75 CAPSULE ORAL ONCE
Status: COMPLETED | OUTPATIENT
Start: 2019-10-07 | End: 2019-10-07

## 2019-10-07 RX ORDER — ACETAMINOPHEN 500 MG
1000 TABLET ORAL ONCE
Status: COMPLETED | OUTPATIENT
Start: 2019-10-07 | End: 2019-10-07

## 2019-10-07 RX ORDER — DIPHENHYDRAMINE HYDROCHLORIDE 50 MG/ML
12.5 INJECTION, SOLUTION INTRAMUSCULAR; INTRAVENOUS AS NEEDED
Status: DISCONTINUED | OUTPATIENT
Start: 2019-10-07 | End: 2019-10-07 | Stop reason: HOSPADM

## 2019-10-07 RX ORDER — HYDROMORPHONE HYDROCHLORIDE 1 MG/ML
0.5 INJECTION, SOLUTION INTRAMUSCULAR; INTRAVENOUS; SUBCUTANEOUS
Status: ACTIVE | OUTPATIENT
Start: 2019-10-07 | End: 2019-10-08

## 2019-10-07 RX ORDER — ROPIVACAINE HYDROCHLORIDE 5 MG/ML
INJECTION, SOLUTION EPIDURAL; INFILTRATION; PERINEURAL
Status: COMPLETED | OUTPATIENT
Start: 2019-10-07 | End: 2019-10-07

## 2019-10-07 RX ORDER — ROPIVACAINE HYDROCHLORIDE 5 MG/ML
30 INJECTION, SOLUTION EPIDURAL; INFILTRATION; PERINEURAL ONCE
Status: DISCONTINUED | OUTPATIENT
Start: 2019-10-07 | End: 2019-10-07 | Stop reason: HOSPADM

## 2019-10-07 RX ORDER — POLYETHYLENE GLYCOL 3350 17 G/17G
17 POWDER, FOR SOLUTION ORAL DAILY
Status: DISCONTINUED | OUTPATIENT
Start: 2019-10-07 | End: 2019-10-08 | Stop reason: HOSPADM

## 2019-10-07 RX ORDER — MELOXICAM 7.5 MG/1
15 TABLET ORAL DAILY
Status: DISCONTINUED | OUTPATIENT
Start: 2019-10-08 | End: 2019-10-08 | Stop reason: HOSPADM

## 2019-10-07 RX ORDER — MIDAZOLAM HYDROCHLORIDE 1 MG/ML
0.5 INJECTION, SOLUTION INTRAMUSCULAR; INTRAVENOUS
Status: DISCONTINUED | OUTPATIENT
Start: 2019-10-07 | End: 2019-10-07 | Stop reason: HOSPADM

## 2019-10-07 RX ORDER — LIDOCAINE HYDROCHLORIDE 10 MG/ML
0.1 INJECTION, SOLUTION EPIDURAL; INFILTRATION; INTRACAUDAL; PERINEURAL AS NEEDED
Status: DISCONTINUED | OUTPATIENT
Start: 2019-10-07 | End: 2019-10-07 | Stop reason: HOSPADM

## 2019-10-07 RX ORDER — ACETAMINOPHEN 325 MG/1
650 TABLET ORAL
Status: DISCONTINUED | OUTPATIENT
Start: 2019-10-07 | End: 2019-10-07

## 2019-10-07 RX ORDER — ONDANSETRON 2 MG/ML
4 INJECTION INTRAMUSCULAR; INTRAVENOUS
Status: ACTIVE | OUTPATIENT
Start: 2019-10-07 | End: 2019-10-08

## 2019-10-07 RX ORDER — ACETAMINOPHEN 325 MG/1
650 TABLET ORAL ONCE
Status: DISCONTINUED | OUTPATIENT
Start: 2019-10-07 | End: 2019-10-07 | Stop reason: HOSPADM

## 2019-10-07 RX ORDER — PAROXETINE HYDROCHLORIDE 20 MG/1
10 TABLET, FILM COATED ORAL DAILY
Status: DISCONTINUED | OUTPATIENT
Start: 2019-10-08 | End: 2019-10-08 | Stop reason: HOSPADM

## 2019-10-07 RX ORDER — ONDANSETRON 2 MG/ML
INJECTION INTRAMUSCULAR; INTRAVENOUS AS NEEDED
Status: DISCONTINUED | OUTPATIENT
Start: 2019-10-07 | End: 2019-10-07 | Stop reason: HOSPADM

## 2019-10-07 RX ORDER — CEFAZOLIN SODIUM/WATER 2 G/20 ML
2 SYRINGE (ML) INTRAVENOUS EVERY 8 HOURS
Status: COMPLETED | OUTPATIENT
Start: 2019-10-07 | End: 2019-10-08

## 2019-10-07 RX ORDER — PROPOFOL 10 MG/ML
INJECTION, EMULSION INTRAVENOUS
Status: DISCONTINUED | OUTPATIENT
Start: 2019-10-07 | End: 2019-10-07 | Stop reason: HOSPADM

## 2019-10-07 RX ORDER — SODIUM CHLORIDE 0.9 % (FLUSH) 0.9 %
5-40 SYRINGE (ML) INJECTION AS NEEDED
Status: DISCONTINUED | OUTPATIENT
Start: 2019-10-07 | End: 2019-10-08 | Stop reason: HOSPADM

## 2019-10-07 RX ORDER — MORPHINE SULFATE 10 MG/ML
2 INJECTION, SOLUTION INTRAMUSCULAR; INTRAVENOUS
Status: DISCONTINUED | OUTPATIENT
Start: 2019-10-07 | End: 2019-10-07 | Stop reason: HOSPADM

## 2019-10-07 RX ORDER — DEXAMETHASONE SODIUM PHOSPHATE 4 MG/ML
INJECTION, SOLUTION INTRA-ARTICULAR; INTRALESIONAL; INTRAMUSCULAR; INTRAVENOUS; SOFT TISSUE AS NEEDED
Status: DISCONTINUED | OUTPATIENT
Start: 2019-10-07 | End: 2019-10-07 | Stop reason: HOSPADM

## 2019-10-07 RX ORDER — SODIUM CHLORIDE, SODIUM LACTATE, POTASSIUM CHLORIDE, CALCIUM CHLORIDE 600; 310; 30; 20 MG/100ML; MG/100ML; MG/100ML; MG/100ML
75 INJECTION, SOLUTION INTRAVENOUS CONTINUOUS
Status: DISCONTINUED | OUTPATIENT
Start: 2019-10-07 | End: 2019-10-07 | Stop reason: HOSPADM

## 2019-10-07 RX ORDER — SODIUM CHLORIDE 9 MG/ML
25 INJECTION, SOLUTION INTRAVENOUS CONTINUOUS
Status: DISCONTINUED | OUTPATIENT
Start: 2019-10-07 | End: 2019-10-07 | Stop reason: HOSPADM

## 2019-10-07 RX ORDER — MELOXICAM 7.5 MG/1
7.5 TABLET ORAL DAILY
Qty: 21 TAB | Refills: 0 | Status: SHIPPED | OUTPATIENT
Start: 2019-10-08 | End: 2019-12-24 | Stop reason: ALTCHOICE

## 2019-10-07 RX ORDER — ONDANSETRON 2 MG/ML
4 INJECTION INTRAMUSCULAR; INTRAVENOUS AS NEEDED
Status: DISCONTINUED | OUTPATIENT
Start: 2019-10-07 | End: 2019-10-07 | Stop reason: HOSPADM

## 2019-10-07 RX ORDER — SODIUM CHLORIDE 0.9 % (FLUSH) 0.9 %
5-40 SYRINGE (ML) INJECTION EVERY 8 HOURS
Status: DISCONTINUED | OUTPATIENT
Start: 2019-10-07 | End: 2019-10-07 | Stop reason: HOSPADM

## 2019-10-07 RX ORDER — LIDOCAINE HYDROCHLORIDE 20 MG/ML
INJECTION, SOLUTION EPIDURAL; INFILTRATION; INTRACAUDAL; PERINEURAL AS NEEDED
Status: DISCONTINUED | OUTPATIENT
Start: 2019-10-07 | End: 2019-10-07 | Stop reason: HOSPADM

## 2019-10-07 RX ORDER — FENTANYL CITRATE 50 UG/ML
50 INJECTION, SOLUTION INTRAMUSCULAR; INTRAVENOUS AS NEEDED
Status: DISCONTINUED | OUTPATIENT
Start: 2019-10-07 | End: 2019-10-07 | Stop reason: HOSPADM

## 2019-10-07 RX ORDER — MIDAZOLAM HYDROCHLORIDE 1 MG/ML
INJECTION, SOLUTION INTRAMUSCULAR; INTRAVENOUS AS NEEDED
Status: DISCONTINUED | OUTPATIENT
Start: 2019-10-07 | End: 2019-10-07 | Stop reason: HOSPADM

## 2019-10-07 RX ORDER — NALOXONE HYDROCHLORIDE 0.4 MG/ML
0.4 INJECTION, SOLUTION INTRAMUSCULAR; INTRAVENOUS; SUBCUTANEOUS AS NEEDED
Status: DISCONTINUED | OUTPATIENT
Start: 2019-10-07 | End: 2019-10-08 | Stop reason: HOSPADM

## 2019-10-07 RX ORDER — CEFAZOLIN SODIUM/WATER 2 G/20 ML
2 SYRINGE (ML) INTRAVENOUS ONCE
Status: COMPLETED | OUTPATIENT
Start: 2019-10-07 | End: 2019-10-07

## 2019-10-07 RX ORDER — MIDAZOLAM HYDROCHLORIDE 1 MG/ML
1 INJECTION, SOLUTION INTRAMUSCULAR; INTRAVENOUS AS NEEDED
Status: DISCONTINUED | OUTPATIENT
Start: 2019-10-07 | End: 2019-10-07 | Stop reason: HOSPADM

## 2019-10-07 RX ORDER — SODIUM CHLORIDE, SODIUM LACTATE, POTASSIUM CHLORIDE, CALCIUM CHLORIDE 600; 310; 30; 20 MG/100ML; MG/100ML; MG/100ML; MG/100ML
125 INJECTION, SOLUTION INTRAVENOUS CONTINUOUS
Status: DISCONTINUED | OUTPATIENT
Start: 2019-10-07 | End: 2019-10-07 | Stop reason: HOSPADM

## 2019-10-07 RX ORDER — AMLODIPINE BESYLATE 5 MG/1
5 TABLET ORAL DAILY
Status: DISCONTINUED | OUTPATIENT
Start: 2019-10-08 | End: 2019-10-08 | Stop reason: HOSPADM

## 2019-10-07 RX ORDER — ACETAMINOPHEN 325 MG/1
650 TABLET ORAL EVERY 6 HOURS
Status: DISCONTINUED | OUTPATIENT
Start: 2019-10-07 | End: 2019-10-08 | Stop reason: HOSPADM

## 2019-10-07 RX ORDER — BUPIVACAINE HYDROCHLORIDE 5 MG/ML
INJECTION, SOLUTION EPIDURAL; INTRACAUDAL
Status: COMPLETED | OUTPATIENT
Start: 2019-10-07 | End: 2019-10-07

## 2019-10-07 RX ORDER — OXYCODONE HYDROCHLORIDE 5 MG/1
5 TABLET ORAL
Status: DISCONTINUED | OUTPATIENT
Start: 2019-10-07 | End: 2019-10-08 | Stop reason: HOSPADM

## 2019-10-07 RX ORDER — AMOXICILLIN 250 MG
1 CAPSULE ORAL 2 TIMES DAILY
Status: DISCONTINUED | OUTPATIENT
Start: 2019-10-07 | End: 2019-10-08 | Stop reason: HOSPADM

## 2019-10-07 RX ORDER — HYDROMORPHONE HYDROCHLORIDE 1 MG/ML
0.2 INJECTION, SOLUTION INTRAMUSCULAR; INTRAVENOUS; SUBCUTANEOUS
Status: DISCONTINUED | OUTPATIENT
Start: 2019-10-07 | End: 2019-10-07 | Stop reason: HOSPADM

## 2019-10-07 RX ORDER — OXYCODONE HYDROCHLORIDE 5 MG/1
10 TABLET ORAL
Status: DISCONTINUED | OUTPATIENT
Start: 2019-10-07 | End: 2019-10-08 | Stop reason: HOSPADM

## 2019-10-07 RX ORDER — SODIUM CHLORIDE, SODIUM LACTATE, POTASSIUM CHLORIDE, CALCIUM CHLORIDE 600; 310; 30; 20 MG/100ML; MG/100ML; MG/100ML; MG/100ML
INJECTION, SOLUTION INTRAVENOUS
Status: DISCONTINUED | OUTPATIENT
Start: 2019-10-07 | End: 2019-10-07 | Stop reason: HOSPADM

## 2019-10-07 RX ORDER — FENTANYL CITRATE 50 UG/ML
25 INJECTION, SOLUTION INTRAMUSCULAR; INTRAVENOUS
Status: DISCONTINUED | OUTPATIENT
Start: 2019-10-07 | End: 2019-10-07 | Stop reason: HOSPADM

## 2019-10-07 RX ORDER — CELECOXIB 200 MG/1
200 CAPSULE ORAL ONCE
Status: COMPLETED | OUTPATIENT
Start: 2019-10-07 | End: 2019-10-07

## 2019-10-07 RX ORDER — FACIAL-BODY WIPES
10 EACH TOPICAL DAILY PRN
Status: DISCONTINUED | OUTPATIENT
Start: 2019-10-09 | End: 2019-10-08 | Stop reason: HOSPADM

## 2019-10-07 RX ORDER — AMOXICILLIN 250 MG
1 CAPSULE ORAL 2 TIMES DAILY
Qty: 30 TAB | Refills: 0 | Status: SHIPPED | OUTPATIENT
Start: 2019-10-07 | End: 2019-12-24 | Stop reason: ALTCHOICE

## 2019-10-07 RX ADMIN — ACETAMINOPHEN 1000 MG: 500 TABLET ORAL at 07:14

## 2019-10-07 RX ADMIN — APIXABAN 2.5 MG: 2.5 TABLET, FILM COATED ORAL at 22:04

## 2019-10-07 RX ADMIN — SENNOSIDES, DOCUSATE SODIUM 1 TABLET: 50; 8.6 TABLET, FILM COATED ORAL at 12:57

## 2019-10-07 RX ADMIN — Medication 2 G: at 15:30

## 2019-10-07 RX ADMIN — ACETAMINOPHEN 650 MG: 325 TABLET, FILM COATED ORAL at 12:56

## 2019-10-07 RX ADMIN — PHENYLEPHRINE HYDROCHLORIDE 30 MCG/MIN: 10 INJECTION INTRAVENOUS at 08:01

## 2019-10-07 RX ADMIN — CELECOXIB 200 MG: 200 CAPSULE ORAL at 07:15

## 2019-10-07 RX ADMIN — SODIUM CHLORIDE 125 ML/HR: 900 INJECTION, SOLUTION INTRAVENOUS at 10:13

## 2019-10-07 RX ADMIN — TRANEXAMIC ACID 1 G: 100 INJECTION, SOLUTION INTRAVENOUS at 08:10

## 2019-10-07 RX ADMIN — SODIUM CHLORIDE, POTASSIUM CHLORIDE, SODIUM LACTATE AND CALCIUM CHLORIDE: 600; 310; 30; 20 INJECTION, SOLUTION INTRAVENOUS at 09:30

## 2019-10-07 RX ADMIN — SODIUM CHLORIDE 125 ML/HR: 900 INJECTION, SOLUTION INTRAVENOUS at 18:59

## 2019-10-07 RX ADMIN — SODIUM CHLORIDE, SODIUM LACTATE, POTASSIUM CHLORIDE, AND CALCIUM CHLORIDE 125 ML/HR: 600; 310; 30; 20 INJECTION, SOLUTION INTRAVENOUS at 07:04

## 2019-10-07 RX ADMIN — SODIUM CHLORIDE, POTASSIUM CHLORIDE, SODIUM LACTATE AND CALCIUM CHLORIDE: 600; 310; 30; 20 INJECTION, SOLUTION INTRAVENOUS at 07:25

## 2019-10-07 RX ADMIN — Medication 2 G: at 07:52

## 2019-10-07 RX ADMIN — MIDAZOLAM 2 MG: 1 INJECTION INTRAMUSCULAR; INTRAVENOUS at 07:40

## 2019-10-07 RX ADMIN — MIDAZOLAM 2 MG: 1 INJECTION INTRAMUSCULAR; INTRAVENOUS at 07:23

## 2019-10-07 RX ADMIN — BUPIVACAINE HYDROCHLORIDE 11 MG: 5 INJECTION, SOLUTION EPIDURAL; INTRACAUDAL; PERINEURAL at 07:45

## 2019-10-07 RX ADMIN — DEXAMETHASONE SODIUM PHOSPHATE 4 MG: 4 INJECTION, SOLUTION INTRAMUSCULAR; INTRAVENOUS at 08:19

## 2019-10-07 RX ADMIN — FENTANYL CITRATE 100 MCG: 50 INJECTION INTRAMUSCULAR; INTRAVENOUS at 07:22

## 2019-10-07 RX ADMIN — ROPIVACAINE HYDROCHLORIDE 30 ML: 5 INJECTION, SOLUTION EPIDURAL; INFILTRATION; PERINEURAL at 08:08

## 2019-10-07 RX ADMIN — LIDOCAINE HYDROCHLORIDE 20 MG: 20 INJECTION, SOLUTION EPIDURAL; INFILTRATION; INTRACAUDAL; PERINEURAL at 07:45

## 2019-10-07 RX ADMIN — PREGABALIN 75 MG: 75 CAPSULE ORAL at 07:15

## 2019-10-07 RX ADMIN — POLYETHYLENE GLYCOL 3350 17 G: 17 POWDER, FOR SOLUTION ORAL at 12:56

## 2019-10-07 RX ADMIN — PROPOFOL 100 MCG/KG/MIN: 10 INJECTION, EMULSION INTRAVENOUS at 07:48

## 2019-10-07 RX ADMIN — OXYCODONE HYDROCHLORIDE 5 MG: 5 TABLET ORAL at 19:16

## 2019-10-07 RX ADMIN — ACETAMINOPHEN 650 MG: 325 TABLET, FILM COATED ORAL at 19:16

## 2019-10-07 RX ADMIN — ONDANSETRON HYDROCHLORIDE 4 MG: 2 INJECTION, SOLUTION INTRAMUSCULAR; INTRAVENOUS at 08:19

## 2019-10-07 RX ADMIN — SENNOSIDES, DOCUSATE SODIUM 1 TABLET: 50; 8.6 TABLET, FILM COATED ORAL at 19:16

## 2019-10-07 NOTE — ANESTHESIA PROCEDURE NOTES
Peripheral Block    Start time: 10/7/2019 7:21 AM  End time: 10/7/2019 7:25 AM  Performed by: Maribeth Opitz, MD  Authorized by: Maribeth Opitz, MD       Pre-procedure: Indications: at surgeon's request and post-op pain management    Preanesthetic Checklist: patient identified, risks and benefits discussed, site marked, timeout performed, anesthesia consent given and patient being monitored    Timeout Time: 07:21          Block Type:   Block Type:   Adductor canal  Laterality:  Left  Monitoring:  Standard ASA monitoring, continuous pulse ox, frequent vital sign checks, heart rate, responsive to questions and oxygen  Injection Technique:  Single shot  Procedures: ultrasound guided    Patient Position: supine  Prep: DuraPrep    Needle Type:  Stimuplex  Needle Gauge:  22 G  Needle Localization:  Ultrasound guidance    Assessment:  Number of attempts:  1  Injection Assessment:  Incremental injection every 5 mL, local visualized surrounding nerve on ultrasound, negative aspiration for blood, no paresthesia and no intravascular symptoms  Patient tolerance:  Patient tolerated the procedure well with no immediate complications

## 2019-10-07 NOTE — OP NOTES
10/7/2019  OPERATIVE REPORT      PREOPERATIVE DIAGNOSIS: Osteoarthritis, left knee. POSTOPERATIVE DIAGNOSIS: Osteoarthritis, left knee. OPERATIVE PROCEDURE: left total knee replacement. SURGEON: Terri Plasencia MD    ASSISTANT SURGEON: Pravin Rojo, [facility]    ANESTHESIA: Spinal.    Antibiotic:Ancef    INDICATIONS: : A 63 y.o.  female  with end stage osteoarthritis to the left knee, not responsive to conservative management. COMPLICATIONS:None    PROCEDURE: The patient was taken to the operating room, underwent  placement of spinal anesthesia. The knee and leg were prepped and  draped in the usual fashion. The leg was exsanguinated with the Esmarch  bandage and tourniquet inflated to 350 mmHg. A longitudinal midline  incision made down through skin and subcutaneous tissue. A medial  parapatellar arthrotomy was performed, and extended proximally along the  medial border of the quadriceps tendon, distally along the medial border of  the insertion of the patella tendon. Medial release was performed. Retropatellar fat pad was sharply excised. The patella was subluxated  laterally, the knee was flexed to 90 degrees. Drill was used to enter the  intramedullary canal of the distal femur. The 5 degree intramedullary guide  was applied and the distal femoral cut was performed. The femur was sized  to a 8. A 8 cutting block was applied, and the anterior, posterior,  chamfer cuts were performed. The extramedullary tibial guide was applied, and the tibia was cut in  neutral alignment. The tibia was sized to a E. Knee was balanced to varus  and valgus stress. Flexion and extension gaps were equal. Trial reduction  was performed, using 10 mm insert. Excellent range of motion and stability  were noted. The patella was everted, and the patella was cut using freehand  technique. Patella was sized to a 35. Drill holes were placed, and patella  button applied. The patella tracked normally.  All trial components were  removed, and surfaces were prepared using drill and punch. All residual  meniscal tissue was sharply excised. Hemostasis was obtained using Bovie  apparatus. All components were cemented in place, taking care to remove all  excess cement. The knee was maintained in full extension while the cement  hardened. The tourniquet was let down after a total tourniquet time of 43  minutes. Hemostasis was obtained using the Bovie apparatus. The joint was  extensively irrigated with antibiotic solution. The arthrotomy was repaired  using #2 Vicryl in interrupted figure-of-eight fashion. Subcutaneous tissue  was approximated using 2-0 Vicryl in interrupted fashion. Skin edges were  approximated using stapling apparatus. Joint was infiltrated with 30 mL of  0.5% Marcaine with epinephrine. Bulky sterile dressing was applied, and the  patient was transferred to recovery room in stable condition. There were no  Complications. The Physician assistant was critical during the procedure in assisting with positioning ,retraction and wound closure. ESTIMATED BLOOD LOSS: 100 cc.     SPECIMEN: Bone      Lexi Holcomb M.D.  10/7/2019  9:21 AM

## 2019-10-07 NOTE — PROGRESS NOTES
TRANSFER - IN REPORT:    Verbal report received from [facility] (name) on Annabel Burr  being received from PACU (unit) for routine post - op      Report consisted of patients Situation, Background, Assessment and   Recommendations(SBAR). Information from the following report(s) SBAR, Kardex, OR Summary, Intake/Output, MAR and Recent Results was reviewed with the receiving nurse. Opportunity for questions and clarification was provided. Assessment completed upon patients arrival to unit and care assumed.

## 2019-10-07 NOTE — PROGRESS NOTES
Ortho Post-Op Note    10/7/2019  3:19 PM    POD:  Day of Surgery  S/P:  Procedure(s):  LEFT TOTAL KNEE REPLACEMENT     Afebrile/VSS, NAD, A&O x 3  Doing well without complaints of nausea  Pain well controlled  Calves soft/NTTP Bilaterally  Incision OK, no drainage or dehiscence. Dressing clean and dry  Moving lower extremities well. Neurocirculatory exam intact and within normal range. T2DM- Metformin at home, Corrective Lispro SS here  Lab Results   Component Value Date/Time    HGB 12.8 09/30/2019 08:42 AM    INR 1.0 09/30/2019 08:42 AM     Recent Labs     09/30/19  0842 07/18/19  1029 12/20/18  1024   CREA 0.78 0.83 0.69   BUN 23* 18 14     Estimated Creatinine Clearance: 97.7 mL/min (by C-G formula based on SCr of 0.78 mg/dL).     PLAN:  DVT prophylaxis: Eliquis 2.5 mg bid  WBAT with PT-mobilization  Pain Control- tylenol, mobic, ice, and oxycodone  Plan to D/C home with HH/PT in 1-2 days      DANIELA Lincoln

## 2019-10-07 NOTE — ANESTHESIA PROCEDURE NOTES
Spinal Block    Start time: 10/7/2019 7:44 AM  End time: 10/7/2019 7:45 AM  Performed by: Kevin Brown CRNA  Authorized by: Jeniffer Lorenzo MD     Pre-procedure: Indications: primary anesthetic  Preanesthetic Checklist: patient identified, risks and benefits discussed, anesthesia consent, site marked, patient being monitored and timeout performed    Timeout Time: 07:39          Spinal Block:   Patient Position:  Seated    Prep: Betadine      Location:  L3-4  Technique:  Single shot    Local Dose (mL):  3    Needle:   Needle type: Sprotte.   Needle Gauge:  24 G  Attempts:  1      Events: CSF confirmed, no blood with aspiration and no paresthesia        Assessment:  Insertion:  Uncomplicated  Patient tolerance:  Patient tolerated the procedure well with no immediate complications

## 2019-10-07 NOTE — PROGRESS NOTES
Primary Nurse Nadine Geiger and Lizzy Hamilton RN performed a dual skin assessment on this patient No impairment noted  Dawson score is 20.

## 2019-10-07 NOTE — ANESTHESIA PREPROCEDURE EVALUATION
Relevant Problems   No relevant active problems       Anesthetic History   No history of anesthetic complications            Review of Systems / Medical History  Patient summary reviewed, nursing notes reviewed and pertinent labs reviewed    Pulmonary  Within defined limits                 Neuro/Psych   Within defined limits           Cardiovascular    Hypertension: well controlled                   GI/Hepatic/Renal  Within defined limits              Endo/Other      Hypothyroidism: well controlled  Obesity and arthritis     Other Findings              Physical Exam    Airway  Mallampati: II  TM Distance: > 6 cm  Neck ROM: normal range of motion   Mouth opening: Normal     Cardiovascular  Regular rate and rhythm,  S1 and S2 normal,  no murmur, click, rub, or gallop             Dental  No notable dental hx       Pulmonary  Breath sounds clear to auscultation               Abdominal  GI exam deferred       Other Findings            Anesthetic Plan    ASA: 3  Anesthesia type: spinal      Post-op pain plan if not by surgeon: peripheral nerve block single    Induction: Intravenous  Anesthetic plan and risks discussed with: Patient

## 2019-10-07 NOTE — PROGRESS NOTES
Problem: Mobility Impaired (Adult and Pediatric)  Goal: *Acute Goals and Plan of Care (Insert Text)  Description  FUNCTIONAL STATUS PRIOR TO ADMISSION: Patient was independent and active without use of DME.    HOME SUPPORT PRIOR TO ADMISSION: The patient lived with  but did not require assist. Lives in 2 story home with 12 steps to second floor. Physical Therapy Goals  Initiated 10/7/2019    1. Patient will move from supine to sit and sit to supine , scoot up and down and roll side to side in bed with modified independence within 4 days. 2. Patient will perform sit to stand with modified independence within 4 days. 3. Patient will ambulate with modified independence for 150 feet with the least restrictive device within 4 days. 4. Patient will ascend/descend 12 stairs with cane and one handrail with modified independence within 4 days. 5. Patient will perform home exercise program per protocol with independence within 4 days. 6. Patient will demonstrate AROM 0-90 degrees in operative joint within 4 days. Outcome: Progressing Towards Goal   PHYSICAL THERAPY EVALUATION  Patient: Eduar Mckenzie (74 y.o. female)  Date: 10/7/2019  Primary Diagnosis: Arthritis of knee, left [M17.12]  Procedure(s) (LRB):  LEFT TOTAL KNEE REPLACEMENT (CHOICE) (Left) Day of Surgery   Precautions:   Fall, WBAT      ASSESSMENT  Based on the objective data described below, the patient presents with  impairment in functional mobility, activity tolerance and balance s/p L TKA. PLOF: Independent with ADLs and IADLs. Lives in two story house with , 12 steps with rail to second floor, no steps to enter the home. Patient eager to get up as her \"leg feels really stiff\". Patient's mobility was on target for POD#0. Will address more exercises, increase gait distance, negotiate stairs and assess for discharge at am PT session tomorrow.  Patient instructed NOT to get up from bed, chair or commode without calling for assistance. She will benefit from [address] [facility] PT in order to achieve maximum level of safe, functional mobility, balance and return to independent PLOF. Current Level of Function Impacting Discharge (mobility/balance): Contact guard for all mobility. Used bedside commode. Ambulated 72 ft with Rw, step-to, slightly antalgic gait, but very steady. Patient attended pre-op JOINT CLASS, is independent with post-op TKA exercise protocol and has same in written, illlustrated form. Functional Outcome Measure: The patient scored 55/100 on the Barthel outcome measure which is indicative of moderate impairment in functional mobility and ADLs. Other factors to consider for discharge: Motivated/A & O x 4/Supportive Family/Independent PLOF      Patient will benefit from skilled therapy intervention to address the above noted impairments. PLAN :  Recommendations and Planned Interventions: bed mobility training, transfer training, gait training, therapeutic exercises, patient and family training/education and therapeutic activities      Frequency/Duration: Patient will be followed by physical therapy:  twice daily to address goals. Recommendation for discharge: (in order for the patient to meet his/her long term goals)  Physical therapy at least 2 days/week in the home     This discharge recommendation:  Has been made in collaboration with the attending provider and/or case management    Equipment recommendations for successful discharge (if) home: straight cane: recommended that  purchase one at the drug store. SUBJECTIVE:   Patient stated I am ready to get up! Eddie Castro    OBJECTIVE DATA SUMMARY:   HISTORY:    Past Medical History:   Diagnosis Date    Arthritis     Encounter for Hemoccult screening 01/24/2017    neg    Hot flash, menopausal 2015    Hypertension 2000    Menopause     Obesity 2000    Pelvic mass in female     Prediabetes 12/29/17    Right thyroid nodule 01/17/2018    Atypia of undetermined significance - pt states dr. Kathia Quach said no further eval    S/P colonoscopy 11/2015    [place], [place]     Past Surgical History:   Procedure Laterality Date    HX KNEE ARTHROSCOPY Right 2013    HX KNEE ARTHROSCOPY Left 2015    HX TUBAL LIGATION         Personal factors and/or comorbidities impacting plan of care: Motivated/A & O x 4/Supportive Family/Independent PLOF     Home Situation  Home Environment: Private residence  # Steps to Enter: 0  Rails to Enter: No  Hand Rails : Right  Wheelchair Ramp: No  One/Two Story Residence: Two story(12 steps)  Lift Chair Available: No  Living Alone: No  Support Systems: Spouse/Significant Other/Partner, Friends \ neighbors  Patient Expects to be Discharged to[de-identified] Private residence  Current DME Used/Available at Home: Walker, rolling    EXAMINATION/PRESENTATION/DECISION MAKING:   Critical Behavior:              Hearing: Auditory  Auditory Impairment: None  Range Of Motion:  AROM: Generally decreased, functional           PROM: Generally decreased, functional           Strength:    Strength: Generally decreased, functional                    Tone & Sensation:   Tone: Normal              Sensation: Intact               Coordination:  Coordination: Within functional limits  Vision:      Functional Mobility:  Bed Mobility:     Supine to Sit: Contact guard assistance  Sit to Supine: Contact guard assistance  Scooting: Contact guard assistance  Transfers:  Sit to Stand: Contact guard assistance  Stand to Sit: Contact guard assistance                       Balance:   Sitting: Intact  Standing: Impaired; Without support  Standing - Static: Good;Constant support  Standing - Dynamic : Good;Constant support  Ambulation/Gait Training:  Distance (ft): 65 Feet (ft)  Assistive Device: Walker, rolling;Gait belt  Ambulation - Level of Assistance: Contact guard assistance        Gait Abnormalities: Antalgic;Decreased step clearance; Step to gait     Left Side Weight Bearing: As tolerated  Base of Support: Widened;Shift to right  Stance: Left decreased  Speed/Katty: Slow  Step Length: Right shortened  Swing Pattern: Left asymmetrical              Therapeutic Exercises: Ankle Pumps  Quad sets (5 second hold)  X 10 reps every hour   Heel slides x 10 TID    Functional Measure:  Barthel Index:    Bathin  Bladder: 10  Bowels: 10  Groomin  Dressin  Feeding: 10  Mobility: 0  Stairs: 0  Toilet Use: 5  Transfer (Bed to Chair and Back): 10  Total: 55/100       The Barthel ADL Index: Guidelines  1. The index should be used as a record of what a patient does, not as a record of what a patient could do. 2. The main aim is to establish degree of independence from any help, physical or verbal, however minor and for whatever reason. 3. The need for supervision renders the patient not independent. 4. A patient's performance should be established using the best available evidence. Asking the patient, friends/relatives and nurses are the usual sources, but direct observation and common sense are also important. However direct testing is not needed. 5. Usually the patient's performance over the preceding 24-48 hours is important, but occasionally longer periods will be relevant. 6. Middle categories imply that the patient supplies over 50 per cent of the effort. 7. Use of aids to be independent is allowed. Helder Plasencia., Barthel, D.W. (9564). Functional evaluation: the Barthel Index. [address] [facility] (14)2. SANDRA Lira, Joselyn Sharif., Michael Abdalla., [address] (). Measuring the change indisability after inpatient rehabilitation; comparison of the responsiveness of the Barthel Index and Functional [place] Measure. Journal of Neurology, Neurosurgery, and Psychiatry, 66(4), 856-077. Praful Duarte, N.J.A, MARISELA Land, & Melia Rock M.A. (2004.) Assessment of post-stroke quality of life in cost-effectiveness studies:  The usefulness of the Barthel Index and the EuroQoL-5D. Quality of Life Research, 15, 370-50           Physical Therapy Evaluation Charge Determination   History Examination Presentation Decision-Making   LOW Complexity : Zero comorbidities / personal factors that will impact the outcome / POC LOW Complexity : 1-2 Standardized tests and measures addressing body structure, function, activity limitation and / or participation in recreation  LOW Complexity : Stable, uncomplicated  LOW Complexity : FOTO score of       Based on the above components, the patient evaluation is determined to be of the following complexity level: LOW     Pain Rating:  3/10    Activity Tolerance:   Good  Please refer to the flowsheet for vital signs taken during this treatment. Vitals:    10/07/19 1258 10/07/19 1336 10/07/19 1420 10/07/19 1426   BP: 110/75 112/74 (P) 117/76 (!) (P) 150/93   BP 1 Location:  Right arm (P) Right arm (P) Right arm   BP Patient Position:  At rest (P) At rest;Head of bed elevated (Comment degrees)  Comment: 50 degrees    Pulse:  82 (P) 99 (!) (P) 102   Resp:  16     Temp:  97.4 °F (36.3 °C)     SpO2:  96%     Weight:       Height:            After treatment patient left in no apparent distress:   Supine in bed, Call bell within reach, Caregiver / family present, Side rails x 3 and nurse notified. COMMUNICATION/EDUCATION:   The patients plan of care was discussed with: Registered Nurse. Fall prevention education was provided and the patient/caregiver indicated understanding., Patient/family have participated as able in goal setting and plan of care. and Patient/family agree to work toward stated goals and plan of care.     Thank you for this referral.  Nate Brito   Time Calculation: 35 mins

## 2019-10-07 NOTE — PERIOP NOTES
TRANSFER - OUT REPORT:    Verbal report given to [address] on Blaise Ricci  being transferred to room 560 for routine post - op       Report consisted of patients Situation, Background, Assessment and   Recommendations(SBAR). Time Pre op antibiotic given:2 gram ancef  Anesthesia Stop time: 7162  Young Present on Transfer to floor:no  Order for Young on Chart:  Discharge Prescriptions with Chart:    Information from the following report(s) SBAR, OR Summary, Intake/Output and MAR was reviewed with the receiving nurse. Opportunity for questions and clarification was provided. Is the patient on 02? NO       L/Min        Other     Is the patient on a monitor? NO    Is the nurse transporting with the patient? NO    Surgical Waiting Area notified of patient's transfer from PACU?  YES      The following personal items collected during your admission accompanied patient upon transfer:   Dental Appliance: Dental Appliances: None  Vision:    Hearing Aid:    Jewelry: Jewelry: Ring(x2 rings to safe)  Clothing: Clothing: (clothes to pacu)  Other Valuables:    Valuables sent to safe: Personal Items Sent to Safe: x2 rings

## 2019-10-07 NOTE — ROUTINE PROCESS
Patient: Gustavo Frankel MRN: [id number]  SSN: [id number] YOB: 1960  Age: 63 y.o. Sex: female Patient is status post Procedure(s): LEFT TOTAL KNEE REPLACEMENT (CHOICE). Surgeon(s) and Role: Jennifer Dowell MD - Primary Local/Dose/Irrigation:  See [facility] Peripheral IV 10/07/19 Right; Inferior Arm (Active) Dressing/Packing:  Wound Knee Left-Dressing Type: ABD pad;4 x 4;Staples;Non adherent;Elastic bandage (10/07/19 0891) Splint/Cast:  ] Other:

## 2019-10-07 NOTE — ANESTHESIA POSTPROCEDURE EVALUATION
Post-Anesthesia Evaluation and Assessment    Patient: dEuar Mckenzie MRN: [id number]  SSN: [id number]    YOB: 1960  Age: 63 y.o. Sex: female      I have evaluated the patient and they are stable and ready for discharge from the PACU. Cardiovascular Function/Vital Signs  Visit Vitals  /78   Pulse 75   Temp 36.4 °C (97.5 °F)   Resp 16   Ht 5' 7\" (1.702 m)   Wt 104.3 kg (230 lb)   SpO2 93%   BMI 36.02 kg/m²       Patient is status post Other anesthesia for Procedure(s):  LEFT TOTAL KNEE REPLACEMENT (CHOICE). Nausea/Vomiting: None    Postoperative hydration reviewed and adequate. Pain:  Pain Scale 1: Numeric (0 - 10) (10/07/19 0941)  Pain Intensity 1: 0 (10/07/19 0941)   Managed    Neurological Status:   Neuro (WDL): Exceptions to WDL(spinal) (10/07/19 0941)  Neuro  LUE Motor Response: Purposeful (10/07/19 0941)  LLE Motor Response: Numbness; Pharmacologically paralyzed(dermatones at L3) (10/07/19 0941)  RUE Motor Response: Purposeful (10/07/19 0941)  RLE Motor Response: Numbness; Pharmacologically paralyzed(dermatones at L3) (10/07/19 0941)   At baseline    Mental Status, Level of Consciousness: Alert and  oriented to person, place, and time    Pulmonary Status:   O2 Device: Nasal cannula (10/07/19 0941)   Adequate oxygenation and airway patent    Complications related to anesthesia: None    Post-anesthesia assessment completed. No concerns    Signed By: Ming Abdi MD     October 7, 2019              Post-Anesthesia Evaluation and Assessment    Patient: Eduar Mckenzie MRN: [id number]  SSN: [id number]    YOB: 1960  Age: 63 y.o. Sex: female      I have evaluated the patient and they are stable and ready for discharge from the PACU.      Cardiovascular Function/Vital Signs  Visit Vitals  /78   Pulse 75   Temp 36.4 °C (97.5 °F)   Resp 16   Ht 5' 7\" (1.702 m)   Wt 104.3 kg (230 lb)   SpO2 93%   BMI 36.02 kg/m²       Patient is status post Other anesthesia for Procedure(s):  LEFT TOTAL KNEE REPLACEMENT (CHOICE). Nausea/Vomiting: None    Postoperative hydration reviewed and adequate. Pain:  Pain Scale 1: Numeric (0 - 10) (10/07/19 0941)  Pain Intensity 1: 0 (10/07/19 0941)   Managed    Neurological Status:   Neuro (WDL): Exceptions to WDL(spinal) (10/07/19 0941)  Neuro  LUE Motor Response: Purposeful (10/07/19 0941)  LLE Motor Response: Numbness; Pharmacologically paralyzed(dermatones at L3) (10/07/19 0941)  RUE Motor Response: Purposeful (10/07/19 0941)  RLE Motor Response: Numbness; Pharmacologically paralyzed(dermatones at L3) (10/07/19 0941)   At baseline    Mental Status, Level of Consciousness: Alert and  oriented to person, place, and time    Pulmonary Status:   O2 Device: Nasal cannula (10/07/19 0941)   Adequate oxygenation and airway patent    Complications related to anesthesia: None    Post-anesthesia assessment completed. No concerns    Signed By: Simona Carvajal MD     October 7, 2019              Procedure(s):  LEFT TOTAL KNEE REPLACEMENT (CHOICE). spinal    <BSHSIANPOST>    Vitals Value Taken Time   /78 10/7/2019  9:50 AM   Temp 36.4 °C (97.5 °F) 10/7/2019  9:41 AM   Pulse 71 10/7/2019  9:58 AM   Resp 15 10/7/2019  9:58 AM   SpO2 98 % 10/7/2019  9:58 AM   Vitals shown include unvalidated device data.

## 2019-10-07 NOTE — BRIEF OP NOTE
BRIEF OPERATIVE NOTE    Date of Procedure: 10/7/2019   Preoperative Diagnosis: OSTEOARTHRITIS OF THE LEFT KNEE  Postoperative Diagnosis: OSTEOARTHRITIS OF THE LEFT KNEE    Procedure(s):  LEFT TOTAL KNEE REPLACEMENT (CHOICE)  Surgeon(s) and Role:     * Jeremy Mathias MD - Primary         Surgical Assistant: Anastasiya Singleton, [facility]    Surgical Staff:  Circ-1: Monique Box RN  Physician Assistant: DANIELA Holt  Scrub Tech-1: Sheryle Mungo  Scrub RN - Intern: Blanche Hanson  Surg Asst-1: Wilhemina Ronny  Event Time In Time Out   Incision Start 4898    Incision Close       Anesthesia: spinal  Estimated Blood Loss: 100cc  Specimens: * No specimens in log *   Findings: DJD   Complications: none  Implants:   Implant Name Type Inv.  Item Serial No.  Lot No. LRB No. Used Action   CEMENT BNE SMARTSET HV 40GM -- ORDER IN SETS OF 20 - SNA  CEMENT BNE SMARTSET HV 40GM -- ORDER IN SETS OF 20 NA [employer] [id number] Left 2 Implanted   articular surface   NA [employer] [id number] Left 1 Implanted   TIB PRN NP STM 5 DEG SZ EL -- PERSONA - SNA  TIB PRN NP STM 5 DEG SZ EL -- PERSONA NA [employer] [id number] Left 1 Implanted   FEM CR ÁNGEL CCR STD SZ 8 LT -- PERSONA - SNA  FEM CR ÁNGEL CCR STD SZ 8 LT -- PERSONA NA [employer] [id number] Left 1 Implanted   INSERT ALL POLY PAT PLY 35MM -- PERSONA - SNA  INSERT ALL POLY PAT PLY 35MM -- PERSONA NA [employer] [id number] Left 1 Implanted

## 2019-10-08 ENCOUNTER — HOME HEALTH ADMISSION (OUTPATIENT)
Dept: HOME HEALTH SERVICES | Facility: HOME HEALTH | Age: 59
End: 2019-10-08
Payer: COMMERCIAL

## 2019-10-08 VITALS
WEIGHT: 230 LBS | HEART RATE: 67 BPM | SYSTOLIC BLOOD PRESSURE: 136 MMHG | HEIGHT: 67 IN | OXYGEN SATURATION: 100 % | TEMPERATURE: 97.5 F | DIASTOLIC BLOOD PRESSURE: 90 MMHG | RESPIRATION RATE: 16 BRPM | BODY MASS INDEX: 36.1 KG/M2

## 2019-10-08 LAB
ANION GAP SERPL CALC-SCNC: 6 MMOL/L (ref 5–15)
BUN SERPL-MCNC: 21 MG/DL (ref 6–20)
BUN/CREAT SERPL: 24 (ref 12–20)
CALCIUM SERPL-MCNC: 9.2 MG/DL (ref 8.5–10.1)
CHLORIDE SERPL-SCNC: 107 MMOL/L (ref 97–108)
CO2 SERPL-SCNC: 27 MMOL/L (ref 21–32)
CREAT SERPL-MCNC: 0.87 MG/DL (ref 0.55–1.02)
GLUCOSE SERPL-MCNC: 126 MG/DL (ref 65–100)
HGB BLD-MCNC: 11.8 G/DL (ref 11.5–16)
POTASSIUM SERPL-SCNC: 3.9 MMOL/L (ref 3.5–5.1)
SODIUM SERPL-SCNC: 140 MMOL/L (ref 136–145)

## 2019-10-08 PROCEDURE — 74011250637 HC RX REV CODE- 250/637: Performed by: PHYSICIAN ASSISTANT

## 2019-10-08 PROCEDURE — 80048 BASIC METABOLIC PNL TOTAL CA: CPT

## 2019-10-08 PROCEDURE — 85018 HEMOGLOBIN: CPT

## 2019-10-08 PROCEDURE — 36415 COLL VENOUS BLD VENIPUNCTURE: CPT

## 2019-10-08 PROCEDURE — 97116 GAIT TRAINING THERAPY: CPT

## 2019-10-08 PROCEDURE — 74011250636 HC RX REV CODE- 250/636: Performed by: PHYSICIAN ASSISTANT

## 2019-10-08 PROCEDURE — 97530 THERAPEUTIC ACTIVITIES: CPT

## 2019-10-08 RX ORDER — OXYCODONE HYDROCHLORIDE 5 MG/1
5 TABLET ORAL
Qty: 60 TAB | Refills: 0 | Status: SHIPPED | OUTPATIENT
Start: 2019-10-08 | End: 2019-10-18

## 2019-10-08 RX ADMIN — PAROXETINE HYDROCHLORIDE 10 MG: 20 TABLET, FILM COATED ORAL at 10:04

## 2019-10-08 RX ADMIN — ACETAMINOPHEN 650 MG: 325 TABLET, FILM COATED ORAL at 00:08

## 2019-10-08 RX ADMIN — ACETAMINOPHEN 650 MG: 325 TABLET, FILM COATED ORAL at 08:12

## 2019-10-08 RX ADMIN — SENNOSIDES, DOCUSATE SODIUM 1 TABLET: 50; 8.6 TABLET, FILM COATED ORAL at 10:04

## 2019-10-08 RX ADMIN — Medication 2 G: at 00:08

## 2019-10-08 RX ADMIN — OXYCODONE HYDROCHLORIDE 5 MG: 5 TABLET ORAL at 13:13

## 2019-10-08 RX ADMIN — OXYCODONE HYDROCHLORIDE 5 MG: 5 TABLET ORAL at 16:50

## 2019-10-08 RX ADMIN — APIXABAN 2.5 MG: 2.5 TABLET, FILM COATED ORAL at 10:04

## 2019-10-08 RX ADMIN — OXYCODONE HYDROCHLORIDE 5 MG: 5 TABLET ORAL at 04:14

## 2019-10-08 RX ADMIN — OXYCODONE HYDROCHLORIDE 5 MG: 5 TABLET ORAL at 08:12

## 2019-10-08 RX ADMIN — POLYETHYLENE GLYCOL 3350 17 G: 17 POWDER, FOR SOLUTION ORAL at 09:58

## 2019-10-08 RX ADMIN — MELOXICAM 15 MG: 7.5 TABLET ORAL at 10:04

## 2019-10-08 RX ADMIN — ACETAMINOPHEN 650 MG: 325 TABLET, FILM COATED ORAL at 13:13

## 2019-10-08 NOTE — PROGRESS NOTES
Ortho Daily Progress Note    10/8/2019  9:10 AM    POD:  1 Day Post-Op  S/P:  Procedure(s):  LEFT TOTAL KNEE REPLACEMENT     Afebrile/VSS, NAD, A&O x 3  Doing well without complaints of nausea  Pain well controlled  Calves soft/NTTP Bilaterally  Incision OK, no drainage or dehiscence. Dressing changed to light dry sterile  Moving lower extremities well. Neurocirculatory exam intact and within normal range. Lab Results   Component Value Date/Time    HGB 11.8 10/08/2019 02:51 AM    INR 1.0 09/30/2019 08:42 AM     Recent Labs     10/08/19  0251 09/30/19  0842 07/18/19  1029 12/20/18  1024   CREA 0.87 0.78 0.83 0.69   BUN 21* 23* 18 14     Estimated Creatinine Clearance: 87.6 mL/min (based on SCr of 0.87 mg/dL).     PLAN:  DVT prophylaxis: Eliquis 2.5 mg bid  WBAT with PT-mobilization  Pain Control- tylenol, mobic, ice, and oxycodone  Plan to D/C home today after PT      DANIELA Weston

## 2019-10-08 NOTE — PROGRESS NOTES
Problem: Mobility Impaired (Adult and Pediatric)  Goal: *Acute Goals and Plan of Care (Insert Text)  Description  FUNCTIONAL STATUS PRIOR TO ADMISSION: Patient was independent and active without use of DME.    HOME SUPPORT PRIOR TO ADMISSION: The patient lived with  but did not require assist. Lives in 2 story home with 12 steps to second floor. Physical Therapy Goals  Initiated 10/7/2019    1. Patient will move from supine to sit and sit to supine , scoot up and down and roll side to side in bed with modified independence within 4 days. 2. Patient will perform sit to stand with modified independence within 4 days. 3. Patient will ambulate with modified independence for 150 feet with the least restrictive device within 4 days. 4. Patient will ascend/descend 12 stairs with cane and one handrail with modified independence within 4 days. 5. Patient will perform home exercise program per protocol with independence within 4 days. 6. Patient will demonstrate AROM 0-90 degrees in operative joint within 4 days. 10/8/2019 1049 by Sudeep, [place] FAROOQ  Outcome: Progressing Towards Goal  PHYSICAL THERAPY TREATMENT  Patient: Meena Tellez (45 y.o. female)  Date: 10/8/2019  Diagnosis: Arthritis of knee, left [M17.12] Arthritis of knee, left  Procedure(s) (LRB):  LEFT TOTAL KNEE REPLACEMENT (CHOICE) (Left) 1 Day Post-Op  Precautions: Fall, WBAT  Chart, physical therapy assessment, plan of care and goals were reviewed. ASSESSMENT  Based on the objective data described below,pt os progressing well and BP has improved compared to this morning. Pt had no c/o lightheadedness or dizziness. Was able to complete stairs x8 using single rail and SPC w/ CGA provided. Pt completed approx 120Ft of gait w/RW and CGA-SBA. Step through gait noted w/o knee buckling. Pt mobility steady overall w/ AD.  Pt is cleared from PT standpoint for d/c home w/ 's assist.     Current Level of Function Impacting Discharge (mobility/balance): CGA-SBA for functional transfers and gait w/ RW. Other factors to consider for discharge: 2 story home w/ 12 interior steps. Lives w/ supportive          PLAN :  Patient continues to benefit from skilled intervention to address the above impairments. Continue treatment per established plan of care. to address goals. Recommendation for discharge: (in order for the patient to meet his/her long term goals)  Physical therapy at least 2 days/week in the home     This discharge recommendation:  Has been made in collaboration with the attending provider and/or case management    Equipment recommendations for successful discharge (if) home: patient owns DME required for discharge       SUBJECTIVE:   Patient stated I'm ready to go home. You can't get any rest here.     OBJECTIVE DATA SUMMARY:   Critical Behavior:                Range of Motion:      Active flexion: 90 degrees (seated)                      Functional Mobility Training:  Bed Mobility:     Supine to Sit: Stand-by assistance  Sit to Supine: (remained OOB in chair)           Transfers:  Sit to Stand: Contact guard assistance  Stand to Sit: Contact guard assistance                             Balance:  Sitting: Intact  Standing: Impaired; Without support  Standing - Static: Constant support;Good  Standing - Dynamic : Constant support;Good  Ambulation/Gait Training:  Distance (ft): 120 Feet (ft)  Assistive Device: Gait belt;Walker, rolling  Ambulation - Level of Assistance: Contact guard assistance        Gait Abnormalities: Antalgic;Decreased step clearance     Left Side Weight Bearing: As tolerated  Base of Support: Widened  Stance: Left decreased  Speed/Katty: Pace decreased (<100 feet/min)  Step Length: Left shortened;Right shortened  Swing Pattern: Left asymmetrical                Stairs:  Number of Stairs Trained: 8(4 steps x2)  Stairs - Level of Assistance: Contact guard assistance; Adaptive equipment(SPC)   Rail Use: Right (SPC in opposite hand)    Therapeutic Exercises:     EXERCISE   Sets   Reps   Active Active Assist   Passive Self ROM   Comments   Ankle Pumps   ? ?                                        ?                                        ?                                           Quad Sets   ? ?                                        ?                                        ?                                           Hamstring Sets   ? ?                                        ?                                        ?                                           Short Arc Quads   ? ?                                        ?                                        ?                                           Knee Extension Stretch     ? ?                                          ?                                          ?                                           Heel Slides   ? ?                                        ?                                        ?                                           Long Arc Quads   ? ?                                        ?                                        ?                                           Knee Flexion Stretch   ? ?                                        ?                                        ?                                           Straight Leg Raises   ?                                         ?                                        ?                                        ?                                               Pain Rating:  3-4/10 (L)    Activity Tolerance:   Good  Please refer to the flowsheet for vital signs taken during this treatment.     After treatment patient left in no apparent distress:   Sitting in chair, Call bell within reach and Caregiver / family present    COMMUNICATION/COLLABORATION:   The patients plan of care was discussed with: Registered Nurse    Kenya Sheets PTA   Time Calculation: 40 mins

## 2019-10-08 NOTE — DISCHARGE SUMMARY
Orthopedic Service Discharge Summary    Patient ID:  Eufemia Monaco  [id number]  female  63 y.o.  1960    Admit date: 10/7/2019    Discharge date and time: 10/8/19    Admitting Physician: Rodrigo Hernandez MD     Discharge Physician: Rodrigo Hernandez MD    Consulting Physician(s): Treatment Team: Attending Provider: Cristhian Madrigal MD    Date of Surgery: 10/7/2019     Preoperative Diagnosis:  OSTEOARTHRITIS OF THE LEFT KNEE    Postoperative Diagnosis: OSTEOARTHRITIS OF THE LEFT KNEE    Procedure(s): Procedure(s):  LEFT TOTAL KNEE REPLACEMENT     Surgeon: Leah Moore) and Role:     Reymundo Forbes MD - Primary      Anesthesia:  Other    Preoperative Medical Clearance:                           HPI:  Pt is a 63 y.o. female who has a history of Arthritis of knee, left [M17.12]  with pain and limitations of activities of daily living who presents at this time for a left total knee arthroplasty following the failure of conservative management. PMH:   Past Medical History:   Diagnosis Date    Arthritis     Encounter for Hemoccult screening 01/24/2017    neg    Hot flash, menopausal 2015    Hypertension 2000    Menopause     Obesity 2000    Pelvic mass in female     Prediabetes 12/29/17    Right thyroid nodule 01/17/2018    Atypia of undetermined significance - pt states dr. Bethanie Inman said no further eval    [place]       Medications upon admission :   Prior to Admission Medications   Prescriptions Last Dose Informant Patient Reported? Taking? PARoxetine (PAXIL) 10 mg tablet 10/7/2019 at 0500  No Yes   Sig: TAKE 1 TAB BY MOUTH DAILY. amLODIPine (NORVASC) 10 mg tablet 10/7/2019 at 0500  No Yes   Sig: TAKE 0.5 TABS BY MOUTH DAILY. hydroCHLOROthiazide (HYDRODIURIL) 12.5 mg tablet 10/6/2019 at Unknown time  No Yes   Sig: TAKE 1 TAB BY MOUTH DAILY.    ibuprofen (MOTRIN) 200 mg tablet 9/30/2019 at Unknown time  Yes Yes   Sig: Take 600 mg by mouth every eight (8) hours as needed for Pain. Facility-Administered Medications: None        Allergies:  No Known Allergies     Hospital Course: The patient underwent surgery. Complications:  None; patient tolerated the procedure well. Was taken to the PACU in stable condition and then transferred to the Orthopedics floor. Condition on discharge:  stable    Perioperative Antibiotics:  Ancef     Postoperative Pain Management:  Acetaminophen, Oxycodone and mobic     DVT Prophylaxis: Eliquis 2.5 mg bid for 2 weeks post op    Postoperative transfusions: None     Post Op complications: none    Hemoglobin at discharge:    Lab Results   Component Value Date/Time    HGB 11.8 10/08/2019 02:51 AM    INR 1.0 09/30/2019 08:42 AM       Dressing was changed on POD # 1. Incision - clean, dry and intact. No significant erythema or swelling. Neurovascular exam within normal limits. Wound appears to be healing without any evidence of infection. Physical Therapy started on the day following surgery and progressed to ambulation with the aid of a rolling walker for distances of **50 feet with modified independence. Range of motion  limited by pain. At the time of discharge, able to go up and down stairs and had understanding of precautions needed following surgery. Discharged to: Home. Discharge instructions:  -See Full Summary of discharge instructions attached  -Anticoagulate with Eliquis  -Resume pre hospital diet            -Resume home medications   Current Discharge Medication List      START taking these medications    Details   oxyCODONE IR (ROXICODONE) 5 mg immediate release tablet Take 1 Tab by mouth every four (4) hours as needed for Pain for up to 10 days. Max Daily Amount: 30 mg. Indications: pain  Qty: 60 Tab, Refills: 0    Associated Diagnoses: Arthritis of knee, left      apixaban (ELIQUIS) 2.5 mg tablet Take 1 Tab by mouth every twelve (12) hours.  Indications: Deep Vein Thrombosis Prevention  Qty: 12 Tab, Refills: 0 meloxicam (MOBIC) 7.5 mg tablet Take 1 Tab by mouth daily. Qty: 21 Tab, Refills: 0      senna-docusate (PERICOLACE) 8.6-50 mg per tablet Take 1 Tab by mouth two (2) times a day. Indications: constipation  Qty: 30 Tab, Refills: 0         CONTINUE these medications which have NOT CHANGED    Details   amLODIPine (NORVASC) 10 mg tablet TAKE 0.5 TABS BY MOUTH DAILY. Qty: 45 Tab, Refills: 11      PARoxetine (PAXIL) 10 mg tablet TAKE 1 TAB BY MOUTH DAILY. Qty: 30 Tab, Refills: 3      hydroCHLOROthiazide (HYDRODIURIL) 12.5 mg tablet TAKE 1 TAB BY MOUTH DAILY.   Qty: 90 Tab, Refills: 3         STOP taking these medications       ibuprofen (MOTRIN) 200 mg tablet Comments:   Reason for Stopping:            per medical continuation form  -Discharge activity: Activity as tolerated and No heavy lifting, pushing, pulling with the implant side for 2 months  -Ambulate with Walkers, Type:  Walker, appropriate total joint protocol  -Wound Care Keep wound clean and dry, Reinforce dressing PRN, Ice to area for comfort and As directed  -Follow up in office in 2-3 weeks      Signed:  DANIELA Parada  10/8/2019  9:14 AM        Manohar Jasso MD

## 2019-10-08 NOTE — DISCHARGE INSTRUCTIONS
Discharge Instructions Knee Replacement  Dr. Dami Beltran    Patient Name  Thea Tse  Date of procedure  10/7/2019    Procedure  Procedure(s):  LEFT TOTAL KNEE REPLACEMENT (CHOICE)  Surgeon  Surgeon(s) and Role:     Oliver Marmolejo MD - Primary  Date of discharge: 10/8/19  PCP: Rosario Jernigan MD    Follow up appointments   Follow up with Dr. Dami Beltran in 2 weeks. Call [phone] to make an appointment.  If home health has been arranged for you the agency will contact you to arrange dates/times for visits. Please call them if you do not hear from them within 24 hours after you are discharged    When to call your Orthopaedic Surgeon: Call [phone]. If you call after 5pm or on a weekend, the on call physician will be contacted   Unrelieved pain   Signs of infection-if your incision is red; continues to have drainage; drainage has a foul odor or if you have a persistent fever over 101 degrees   Signs of a blood clot in your leg-calf pain, tenderness, redness, swelling of lower leg    When to call your Primary Care Physician:   Concerns about medical conditions such as diabetes, high blood pressure, asthma, congestive heart failure   Call if blood sugars are elevated, persistent headache or dizziness, coughing or congestion, constipation or diarrhea, burning with urination, abnormal heart rate    When to call [phone] go to the nearest emergency room   Acute onset of chest pain, shortness of breath, difficulty breathing      Activity   Weight bearing as tolerated with walker or crutches.  Refer to your patient handbook for instructions and pictures   Complete your Home Exercise Program daily as instructed by your therapist.  Refer to your patient handbook for instructions and pictures   Get up every one hour and walk (except at night when sleeping)   Do not drive or operate heavy machinery      Incision Care   You will have staples in your knee incision; they will be removed at the surgeons office visit in 2 weeks   Keep a dressing (ABD and paper tape) on your incision and change daily. Wash your hands thoroughly before changing the dressing. Once you incision is not draining, you may leave it open to air   You may shower and get your incision wet but do not submerge your incision under water in a bath tub, hot tub or swimming pool for 6 weeks after surgery. Preventing blood clots   Take Eliquis 2.5 mg twice daily for 2 weeks as prescribed    Pain management   Keep ice wrap in place except when walking; changing gel packs every 4 hours   Lie down and elevate your leg on pillows for about 30 minutes after walking to decrease swelling and pain   Do ankle pumps (10 repetitions) every hour while awake and get up frequently to walk   Take Tylenol 1000 mg every 6 hours for 2 weeks    Take narcotic pain pill as prescribed if needed. Take with food; avoid alcohol while taking pain medication. Decrease the amount of narcotic pain medication as your pain lessens     Diet   Resume usual diet; drink plenty of fluids; eat foods high in fiber   Take over-the-counter stool softeners and laxatives to prevent constipation    Patient Education      Apixaban (Eliquis) - (By mouth)   Why this medicine is used:   Treats and prevents blood clots. Contact a nurse or doctor right away if you have:  · Sudden or severe headache  · Back pain, numbness, tingling, weakness in your legs or feet  · Loss of bladder or bowel control  · Bloody vomit or vomit that looks like coffee grounds; bloody or black, tarry stools  · Bleeding that does not stop or bruises that do not heal     Common side effects:  · Minor bleeding or bruising  Patient Education        Learning About Diabetes Food Guidelines  Your Care Instructions    Meal planning is important to manage diabetes. It helps keep your blood sugar at a target level (which you set with your doctor). You don't have to eat special foods.  You can eat what your family eats, including sweets once in a while. But you do have to pay attention to how often you eat and how much you eat of certain foods. You may want to work with a dietitian or a certified diabetes educator (CDE) to help you plan meals and snacks. A dietitian or CDE can also help you lose weight if that is one of your goals. What should you know about eating carbs? Managing the amount of carbohydrate (carbs) you eat is an important part of healthy meals when you have diabetes. Carbohydrate is found in many foods. · Learn which foods have carbs. And learn the amounts of carbs in different foods. ? Bread, cereal, pasta, and rice have about 15 grams of carbs in a serving. A serving is 1 slice of bread (1 ounce), ½ cup of cooked cereal, or 1/3 cup of cooked pasta or rice. ? Fruits have 15 grams of carbs in a serving. A serving is 1 small fresh fruit, such as an apple or orange; ½ of a banana; ½ cup of cooked or canned fruit; ½ cup of fruit juice; 1 cup of melon or raspberries; or 2 tablespoons of dried fruit. ? Milk and no-sugar-added yogurt have 15 grams of carbs in a serving. A serving is 1 cup of milk or 2/3 cup of no-sugar-added yogurt. ? Starchy vegetables have 15 grams of carbs in a serving. A serving is ½ cup of mashed potatoes or sweet potato; 1 cup winter squash; ½ of a small baked potato; ½ cup of cooked beans; or ½ cup cooked corn or green peas. · Learn how much carbs to eat each day and at each meal. A dietitian or CDE can teach you how to keep track of the amount of carbs you eat. This is called carbohydrate counting. · If you are not sure how to count carbohydrate grams, use the Plate Method to plan meals. It is a good, quick way to make sure that you have a balanced meal. It also helps you spread carbs throughout the day. ? Divide your plate by types of foods.  Put non-starchy vegetables on half the plate, meat or other protein food on one-quarter of the plate, and a grain or starchy vegetable in the final quarter of the plate. To this you can add a small piece of fruit and 1 cup of milk or yogurt, depending on how many carbs you are supposed to eat at a meal.  · Try to eat about the same amount of carbs at each meal. Do not \"save up\" your daily allowance of carbs to eat at one meal.  · Proteins have very little or no carbs per serving. Examples of proteins are beef, chicken, turkey, fish, eggs, tofu, cheese, cottage cheese, and peanut butter. A serving size of meat is 3 ounces, which is about the size of a deck of cards. Examples of meat substitute serving sizes (equal to 1 ounce of meat) are 1/4 cup of cottage cheese, 1 egg, 1 tablespoon of peanut butter, and ½ cup of tofu. How can you eat out and still eat healthy? · Learn to estimate the serving sizes of foods that have carbohydrate. If you measure food at home, it will be easier to estimate the amount in a serving of restaurant food. · If the meal you order has too much carbohydrate (such as potatoes, corn, or baked beans), ask to have a low-carbohydrate food instead. Ask for a salad or green vegetables. · If you use insulin, check your blood sugar before and after eating out to help you plan how much to eat in the future. · If you eat more carbohydrate at a meal than you had planned, take a walk or do other exercise. This will help lower your blood sugar. What else should you know? · Limit saturated fat, such as the fat from meat and dairy products. This is a healthy choice because people who have diabetes are at higher risk of heart disease. So choose lean cuts of meat and nonfat or low-fat dairy products. Use olive or canola oil instead of butter or shortening when cooking. · Don't skip meals. Your blood sugar may drop too low if you skip meals and take insulin or certain medicines for diabetes. · Check with your doctor before you drink alcohol. Alcohol can cause your blood sugar to drop too low.  Alcohol can also cause a bad reaction if you take certain diabetes medicines. Follow-up care is a key part of your treatment and safety. Be sure to make and go to all appointments, and call your doctor if you are having problems. It's also a good idea to know your test results and keep a list of the medicines you take. Where can you learn more? Go to http://rafaeal-joss.info/. Enter Z919 in the search box to learn more about \"Learning About Diabetes Food Guidelines. \"  Current as of: April 16, 2019  Content Version: 12.2  © 8150-0303 [employer]. Care instructions adapted under license by [employer] (which disclaims liability or warranty for this information). If you have questions about a medical condition or this instruction, always ask your healthcare professional. [address] any warranty or liability for your use of this information.

## 2019-10-08 NOTE — PROGRESS NOTES
Bedside shift change report given to Aileen Corral RN (oncoming nurse) by Helder Vazquez RN (offgoing nurse). Report included the following information SBAR, Kardex, Intake/Output, MAR and Recent Results.

## 2019-10-08 NOTE — PROGRESS NOTES
Bedside and Verbal shift change report given to VICTORIANO Cortez (oncoming nurse) by Jamal Aquino RN (offgoing nurse). Report included the following information SBAR.

## 2019-10-08 NOTE — PROGRESS NOTES
Problem: Mobility Impaired (Adult and Pediatric)  Goal: *Acute Goals and Plan of Care (Insert Text)  Description  FUNCTIONAL STATUS PRIOR TO ADMISSION: Patient was independent and active without use of DME.    HOME SUPPORT PRIOR TO ADMISSION: The patient lived with  but did not require assist. Lives in 2 story home with 12 steps to second floor. Physical Therapy Goals  Initiated 10/7/2019    1. Patient will move from supine to sit and sit to supine , scoot up and down and roll side to side in bed with modified independence within 4 days. 2. Patient will perform sit to stand with modified independence within 4 days. 3. Patient will ambulate with modified independence for 150 feet with the least restrictive device within 4 days. 4. Patient will ascend/descend 12 stairs with cane and one handrail with modified independence within 4 days. 5. Patient will perform home exercise program per protocol with independence within 4 days. 6. Patient will demonstrate AROM 0-90 degrees in operative joint within 4 days. Outcome: Progressing Towards Goal   PHYSICAL THERAPY MORNING SESSION NOTE  Patient: Daryle Stage (6 y.o. female)  Date: 10/8/2019  Primary Diagnosis: Arthritis of knee, left [M17.12]  Procedure(s) (LRB):  LEFT TOTAL KNEE REPLACEMENT (CHOICE) (Left) 1 Day Post-Op   Precautions: Fall, WBAT    Daryle Stage was seen for morning PT session. She is walking 60 feet at CGA level and with a RW. The primary limiting factors is pt became orthostatic. She experienced a drop in BP post gait to (121/76 to 102/70) and reported dizziness. Instructed pt to sit for several minutes and stood for BP prior to stair training (BP 85/57 HR 53). Currently, expect Daryle Stage will need 1-2 more session(s) in preparation for returning home if she can clear stair training this morning and BP is stable. The full therapy note will follow after this afternoon's session.     Vitals:    10/08/19 0931 10/08/19 5130 10/08/19 0948 10/08/19 0953   BP: 102/70 (!) 85/57 119/76 106/71   BP 1 Location:       BP Patient Position: Post activity; Sitting Standing At rest;Supine; Head of bed elevated (Comment degrees)  Comment: max elevation    Pulse: 70 (!) 53 61 64   Resp:    16   Temp:    97.9 °F (36.6 °C)   SpO2:    96%   Weight:       Height:            Morning session functional mobility:  Bed Mobility:     Supine to Sit: Stand-by assistance;Bed Modified(HOB elevated)  Sit to Supine: Stand-by assistance     Transfers:  Sit to Stand: Contact guard assistance  Stand to Sit: Contact guard assistance                       Balance:   Sitting: Intact  Standing: Impaired; Without support  Standing - Static: Constant support;Good  Standing - Dynamic : Constant support;Good  Ambulation/Gait Training:  Distance (ft): 60 Feet (ft)  Assistive Device: Gait belt;Walker, rolling  Ambulation - Level of Assistance: Contact guard assistance        Gait Abnormalities: Antalgic;Decreased step clearance; Step to gait(progressed to step through)     Left Side Weight Bearing: As tolerated  Base of Support: Shift to right;Widened  Stance: Left decreased  Speed/Katty: Pace decreased (<100 feet/min)  Step Length: Left shortened;Right shortened  Swing Pattern: Left asymmetrical              Stairs:    Will plan for this afternoon           Thank you for this referral.  Kenya SheetsPTA   Time Calculation: 35 mins

## 2019-10-08 NOTE — PROGRESS NOTES
VIJI: Referral sent to [facility] and accepted, The patient will discharge today via . Care Management Interventions  PCP Verified by CM: Yes  Palliative Care Criteria Met (RRAT>21 & CHF Dx)?: No  Mode of Transport at Discharge: Self  Transition of Care Consult (CM Consult): [address]: Yes  MyChart Signup: No  Discharge Durable Medical Equipment: No(has RW, Cane, and Toilet Seat)  Health Maintenance Reviewed: Yes  Physical Therapy Consult: Yes  Occupational Therapy Consult: Yes  Speech Therapy Consult: No  Current Support Network: Lives with Spouse  Confirm Follow Up Transport: Family  Plan discussed with Pt/Family/Caregiver: Yes  Freedom of Choice Offered: Yes  [place] Resource Information Provided?: No  Discharge Location  Discharge Placement: Home with home health    Reason for Admission:   Left Total Knee Replacement                   RRAT Score:     5                Plan for utilizing home health:      Referral sent to [facility]  [place] of Choice Letter signed and placed in the hard chart                    Current Advanced Directive/Advance Care Plan: Full Code/Patinet aware that the hospital can assist with the ADV                         CRM met with the patient and her . The patient will discharge home today with home health nursing and PT. CRM offered agency choice. The patient had no preference. CRM Sent referral to [facility] via [address] The patient will discharge home today via her . The patient has a RW, Cane, and at toilet seat at home. The patient's  will be the patient's support system. The patient was independent prior to surgery. CRM confirmed that Dr. Faye Galloway is the patient's PCP and the patient does not have any financial concerns filling medications at this time. .The patient will follow up with Dr. Patsy Hester. [facility] accepted the case.      Nette Perkins, [address] [place] [address] Avenue   [phone]

## 2019-10-09 ENCOUNTER — PATIENT OUTREACH (OUTPATIENT)
Dept: CASE MANAGEMENT | Age: 59
End: 2019-10-09

## 2019-10-09 ENCOUNTER — HOME CARE VISIT (OUTPATIENT)
Dept: SCHEDULING | Facility: HOME HEALTH | Age: 59
End: 2019-10-09
Payer: COMMERCIAL

## 2019-10-09 PROCEDURE — 400013 HH SOC

## 2019-10-09 PROCEDURE — G0151 HHCP-SERV OF PT,EA 15 MIN: HCPCS

## 2019-10-10 NOTE — PROGRESS NOTES
Hospital Discharge Follow-Up      Date/Time:  10/10/2019 3:18 PM    Patient was admitted to [facility] on 10/7/19 and discharged on 10/8/19 for OSTEOARTHRITIS OF THE LEFT KNEE. The physician discharge summary was available at the time of outreach. Patient was contacted within 1 business days of discharge. Top Challenges reviewed with the provider   S/P   LEFT TOTAL KNEE REPLACEMENT       Advance Care Planning:   Does patient have an Advance Directive:   not on file        Method of communication with provider :staff message    Inpatient RRAT score: 5  Was this a readmission? no   Patient stated reason for the readmission:     Care Transition Nurse (CTN) contacted the patient by telephone to perform post hospital discharge assessment. Verified name and  with patient as identifiers. Provided introduction to self, and explanation of the CTN role. Patient received hospital discharge instructions. CTN reviewed discharge instructions and red flags with patient who verbalized understanding. Patient given an opportunity to ask questions and does not have any further questions or concerns at this time. The patient agrees to contact the PCP office for questions related to their healthcare. CTN provided contact information for future reference. Disease Specific:       Patients top risk factors for readmission:  None    Home Health orders at discharge: [facility]: [facility]  Date of initial visit: 10/9/19    Durable Medical Equipment ordered at discharge:none ordered patient already had RW cane and elevated toilet seat. [address]:   Durable Medical Equipment received:     Medication(s):   New Medications at Discharge: roxicodone  eliquis  mobic  pericolace  Changed Medications at Discharge:  Discontinued Medications at Discharge:     Medication reconciliation was performed with patient, who verbalizes understanding of administration of home medications.   There were no barriers to obtaining medications identified at this time. Referral to Pharm D needed: no     Current Outpatient Medications   Medication Sig    oxyCODONE IR (ROXICODONE) 5 mg immediate release tablet Take 1 Tab by mouth every four (4) hours as needed for Pain for up to 10 days. Max Daily Amount: 30 mg. Indications: pain    apixaban (ELIQUIS) 2.5 mg tablet Take 1 Tab by mouth every twelve (12) hours. Indications: Deep Vein Thrombosis Prevention    meloxicam (MOBIC) 7.5 mg tablet Take 1 Tab by mouth daily.  senna-docusate (PERICOLACE) 8.6-50 mg per tablet Take 1 Tab by mouth two (2) times a day. Indications: constipation    amLODIPine (NORVASC) 10 mg tablet TAKE 0.5 TABS BY MOUTH DAILY.  PARoxetine (PAXIL) 10 mg tablet TAKE 1 TAB BY MOUTH DAILY.  hydroCHLOROthiazide (HYDRODIURIL) 12.5 mg tablet TAKE 1 TAB BY MOUTH DAILY.  acetaminophen (TYLENOL) 500 mg tablet Take 500 mg by mouth every six (6) hours as needed for Pain. No current facility-administered medications for this visit. There are no discontinued medications. [facility] follow up appointment(s):   Future Appointments   Date Time Provider Danyell Santiago   10/12/2019 To Be Determined Cheyanne Pascual RN [address] [facility]   12/20/2019  9:00 AM Chelsy Duran MD [address] [place] [address]      Non-[facility] follow up appointment(s):   [facility]:      Goals      Attends follow-up appointments as directed. 10/10/19 reminded patient to make her follow up appointment with ortho.  Understands red flags post discharge. 10/10/19  Spoke to patient concerning post-op constipation and the causes.

## 2019-10-12 ENCOUNTER — HOME CARE VISIT (OUTPATIENT)
Dept: SCHEDULING | Facility: HOME HEALTH | Age: 59
End: 2019-10-12
Payer: COMMERCIAL

## 2019-10-12 VITALS
SYSTOLIC BLOOD PRESSURE: 126 MMHG | DIASTOLIC BLOOD PRESSURE: 78 MMHG | TEMPERATURE: 98 F | RESPIRATION RATE: 18 BRPM | OXYGEN SATURATION: 99 % | HEART RATE: 78 BPM

## 2019-10-12 PROCEDURE — G0299 HHS/HOSPICE OF RN EA 15 MIN: HCPCS

## 2019-10-15 ENCOUNTER — HOME CARE VISIT (OUTPATIENT)
Dept: SCHEDULING | Facility: HOME HEALTH | Age: 59
End: 2019-10-15
Payer: COMMERCIAL

## 2019-10-15 PROCEDURE — G0151 HHCP-SERV OF PT,EA 15 MIN: HCPCS

## 2019-10-16 VITALS
TEMPERATURE: 98.3 F | SYSTOLIC BLOOD PRESSURE: 126 MMHG | DIASTOLIC BLOOD PRESSURE: 70 MMHG | RESPIRATION RATE: 16 BRPM | OXYGEN SATURATION: 97 % | HEART RATE: 67 BPM

## 2019-10-17 ENCOUNTER — HOME CARE VISIT (OUTPATIENT)
Dept: SCHEDULING | Facility: HOME HEALTH | Age: 59
End: 2019-10-17
Payer: COMMERCIAL

## 2019-10-17 PROCEDURE — G0151 HHCP-SERV OF PT,EA 15 MIN: HCPCS

## 2019-10-20 VITALS — HEART RATE: 66 BPM | TEMPERATURE: 98.2 F | RESPIRATION RATE: 16 BRPM | OXYGEN SATURATION: 97 %

## 2019-12-11 RX ORDER — HYDROCHLOROTHIAZIDE 12.5 MG/1
TABLET ORAL
Qty: 90 TAB | Refills: 3 | Status: SHIPPED | OUTPATIENT
Start: 2019-12-11 | End: 2020-12-18

## 2019-12-17 RX ORDER — PAROXETINE 10 MG/1
TABLET, FILM COATED ORAL
Qty: 30 TAB | Refills: 3 | Status: SHIPPED | OUTPATIENT
Start: 2019-12-17 | End: 2019-12-24 | Stop reason: ALTCHOICE

## 2019-12-24 ENCOUNTER — OFFICE VISIT (OUTPATIENT)
Dept: INTERNAL MEDICINE CLINIC | Age: 59
End: 2019-12-24

## 2019-12-24 VITALS
SYSTOLIC BLOOD PRESSURE: 117 MMHG | HEART RATE: 72 BPM | BODY MASS INDEX: 36.54 KG/M2 | OXYGEN SATURATION: 98 % | RESPIRATION RATE: 18 BRPM | DIASTOLIC BLOOD PRESSURE: 82 MMHG | TEMPERATURE: 97.7 F | HEIGHT: 67 IN | WEIGHT: 232.8 LBS

## 2019-12-24 DIAGNOSIS — I10 ESSENTIAL HYPERTENSION: Primary | ICD-10-CM

## 2019-12-24 DIAGNOSIS — E04.1 RIGHT THYROID NODULE: ICD-10-CM

## 2019-12-24 DIAGNOSIS — R73.03 PREDIABETES: ICD-10-CM

## 2019-12-24 DIAGNOSIS — M19.90 ARTHRITIS: ICD-10-CM

## 2019-12-24 DIAGNOSIS — E66.09 CLASS 2 OBESITY DUE TO EXCESS CALORIES WITHOUT SERIOUS COMORBIDITY WITH BODY MASS INDEX (BMI) OF 37.0 TO 37.9 IN ADULT: ICD-10-CM

## 2019-12-24 DIAGNOSIS — N95.1 HOT FLASH, MENOPAUSAL: ICD-10-CM

## 2019-12-24 PROBLEM — Z96.652 S/P TKR (TOTAL KNEE REPLACEMENT), LEFT: Status: ACTIVE | Noted: 2019-10-07

## 2019-12-24 NOTE — PROGRESS NOTES
1. Have you been to the ER, urgent care clinic since your last visit? Hospitalized since your last visit? No    2. Have you seen or consulted any other health care providers outside of the [address] [place] [address] since your last visit? Include any pap smears or colon screening.  No

## 2019-12-24 NOTE — PROGRESS NOTES
SPORTS MEDICINE AND PRIMARY CARE  Ioana Matamoros MD, [address] [address]  Phone:  [phone]  Fax: [phone]       Chief Complaint   Patient presents with    Hypertension   . SUBJECTIVE:    Eufemia Monaco is a 63 y.o. female Since we last saw her on 10/07/19 G. Patricia Rondon performed a left TKR uneventfully, postoperatively she has done exceptionally well. She is now back to work. She comes in for follow up. She has a known history of primary hypertension, obesity, prediabetes, right thyroid nodule, menopausal symptoms, and is seen for evaluation. Current Outpatient Medications   Medication Sig Dispense Refill    hydroCHLOROthiazide (HYDRODIURIL) 12.5 mg tablet TAKE 1 TAB BY MOUTH DAILY. 90 Tab 3    amLODIPine (NORVASC) 10 mg tablet TAKE 0.5 TABS BY MOUTH DAILY.  39 Tab 11     Past Medical History:   Diagnosis Date    Arthritis     Encounter for Hemoccult screening 01/24/2017    neg    Hot flash, menopausal 2015    Hypertension 2000    Menopause     Obesity 2000    Pelvic mass in female     Prediabetes 12/29/17    Right thyroid nodule 01/17/2018    Atypia of undetermined significance - pt states dr. Bethanie Inman said no further eval    [place] 11[place]    S/P TKR (total knee replacement), left 10/07/2019    Kyle Merino MD     Past Surgical History:   Procedure Laterality Date    HX KNEE ARTHROSCOPY Right 2013    HX KNEE ARTHROSCOPY Left 2015    HX KNEE REPLACEMENT Left     HX TUBAL LIGATION       No Known Allergies      REVIEW OF SYSTEMS:  General: negative for - chills or fever  ENT: negative for - headaches, nasal congestion or tinnitus  Respiratory: negative for - cough, hemoptysis, shortness of breath or wheezing  Cardiovascular : negative for - chest pain, edema, palpitations or shortness of breath  Gastrointestinal: negative for - abdominal pain, blood in stools, heartburn or nausea/vomiting  Genito-Urinary: no dysuria, trouble voiding, or hematuria  Musculoskeletal: negative for - gait disturbance, joint pain, joint stiffness or joint swelling  Neurological: no TIA or stroke symptoms  Hematologic: no bruises, no bleeding, no swollen glands  Integument: no lumps, mole changes, nail changes or rash  Endocrine: no malaise/lethargy or unexpected weight changes      Social History     Socioeconomic History    Marital status:      Spouse name: Not on file    Number of children: Not on file    Years of education: Not on file    Highest education level: Not on file   Tobacco Use    Smoking status: Never Smoker    Smokeless tobacco: Never Used   Substance and Sexual Activity    Alcohol use: No    Drug use: Never    Sexual activity: Yes     Partners: Male   Social History Narrative    Habits: There is no history of smoking, alcohol abuse or drug abuse.           Social History:  The patient is  and lives with her . The patient is the mother of 29 25and 39year-old children and three grandchildren. 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 Her [employer] background is [employer].          Family History:  Father  at 79 of a sickness. Mother  of complications of alcoholism. She has eight siblings.      Family History   Problem Relation Age of Onset    Hypertension Mother     Diabetes Mother     No Known Problems Father     Diabetes Sister     Hypertension Sister     No Known Problems Brother     Breast Cancer Maternal Grandmother     Breast Cancer Maternal Aunt     Hypertension Sister     Other Sister         SPINA BIFIDA    No Known Problems Brother     Anesth Problems Neg Hx        OBJECTIVE:    Visit Vitals  /82   Pulse 72   Temp 97.7 °F (36.5 °C) (Oral)   Resp 18   Ht 5' 7\" (1.702 m)   Wt 232 lb 12.8 oz (105.6 kg)   SpO2 98%   BMI 36.46 kg/m²     CONSTITUTIONAL: well , well nourished, appears age appropriate  EYES: perrla, eom intact  ENMT:moist mucous membranes, pharynx clear  NECK: supple. Thyroid normal  RESPIRATORY: Chest: clear bilaterally   CARDIOVASCULAR: Heart: regular rate and rhythm  GASTROINTESTINAL: Abdomen: soft, bowel sounds active  HEMATOLOGIC: no pathological lymph nodes palpated  MUSCULOSKELETAL: Extremities: no edema, pulse 1+   INTEGUMENT: No unusual rashes or suspicious skin lesions noted. Nails appear normal.  NEUROLOGIC: non-focal exam   MENTAL STATUS: alert and oriented, appropriate affect           ASSESSMENT:  1. Essential hypertension    2. Class 2 obesity due to excess calories without serious comorbidity with body mass index (BMI) of 37.0 to 37.9 in adult    3. Prediabetes    4. Right thyroid nodule    5. Hot flash, menopausal    6. Arthritis      Patient's BP control is excellent. No adjustments will be made. She continues to be plagued with obesity and we continue to encourage a heart healthy, weight reducing diet. She is _______________. Known history of prediabetes, which remains in that category. Hemoglobin A1c remains less than 6.4. The right thyroid nodule was to be reevaluated, which she declined, and continues to refuse to have ________________. But she then goes on to tell me that she did see Dr. Jhonny Merlin, who stated that ________________ evaluation was needed. She is taking Paxil for the hot flashes and it seems to be helping. She wonders if she can stop it. We advise her she can stop it, but the symptoms may return. She will think about it this summer. She is now concerned about arthritis in the contralateral knee. Everything went extremely well with the left knee replacement. She will be back to see us in six months, sooner if she has any problems. I have discussed the diagnosis with the patient and the intended plan as seen in the  orders above. The patient understands and agees with the plan.   The patient has   received an after visit summary and questions were answered concerning  future plans  Patient labs and/or xrays were reviewed  Past records were reviewed. PLAN:  . No orders of the defined types were placed in this encounter. Follow-up and Dispositions    · Return in about 6 months (around 6/24/2020). ATTENTION:   This medical record was transcribed using an electronic medical records system. Although proofread, it may and can contain electronic and spelling errors. Other human spelling and other errors may be present. Corrections may be executed at a later time. Please feel free to contact us for any clarifications as needed.

## 2020-04-03 ENCOUNTER — OFFICE VISIT (OUTPATIENT)
Dept: INTERNAL MEDICINE CLINIC | Age: 60
End: 2020-04-03

## 2020-04-03 VITALS
HEART RATE: 80 BPM | WEIGHT: 237 LBS | BODY MASS INDEX: 37.2 KG/M2 | HEIGHT: 67 IN | SYSTOLIC BLOOD PRESSURE: 120 MMHG | TEMPERATURE: 97.7 F | OXYGEN SATURATION: 98 % | DIASTOLIC BLOOD PRESSURE: 80 MMHG | RESPIRATION RATE: 18 BRPM

## 2020-04-03 DIAGNOSIS — M17.12 ARTHRITIS OF KNEE, LEFT: Primary | ICD-10-CM

## 2020-04-03 DIAGNOSIS — E04.1 RIGHT THYROID NODULE: ICD-10-CM

## 2020-04-03 DIAGNOSIS — E66.01 SEVERE OBESITY WITH BODY MASS INDEX (BMI) OF 35.0 TO 39.9 WITH SERIOUS COMORBIDITY (HCC): ICD-10-CM

## 2020-04-03 DIAGNOSIS — R73.03 PREDIABETES: ICD-10-CM

## 2020-04-03 DIAGNOSIS — I10 ESSENTIAL HYPERTENSION: ICD-10-CM

## 2020-04-03 RX ORDER — PREDNISONE 20 MG/1
60 TABLET ORAL
Qty: 21 TAB | Refills: 0 | Status: SHIPPED | OUTPATIENT
Start: 2020-04-03 | End: 2020-08-07

## 2020-04-03 RX ORDER — CYCLOBENZAPRINE HCL 10 MG
10 TABLET ORAL
Qty: 60 TAB | Refills: 2 | Status: SHIPPED | OUTPATIENT
Start: 2020-04-03 | End: 2020-08-07

## 2020-04-03 NOTE — PROGRESS NOTES
SPORTS MEDICINE AND PRIMARY CARE  Mariana Espinal MD, [address] [address]  Phone:  [phone]  Fax: [phone]       Chief Complaint   Patient presents with    Hip Pain   . SUBJECTIVE:    Phill Merino is a 63 y.o. female Patient returns today with a known history of arthritis of the left knee, status post knee replacement, obesity, right thyroid nodule, prediabetes, hypertension, and is seen for evaluation. For the past few weeks she has noted low back pain radiating to left hip and down her left leg. It is aggravated by standing. Other complaints denied and she is seen for evaluation. Current Outpatient Medications   Medication Sig Dispense Refill    predniSONE (DELTASONE) 20 mg tablet Take 60 mg by mouth daily (with breakfast). 21 Tab 0    cyclobenzaprine (FLEXERIL) 10 mg tablet Take 1 Tab by mouth three (3) times daily as needed for Muscle Spasm(s). 60 Tab 2    hydroCHLOROthiazide (HYDRODIURIL) 12.5 mg tablet TAKE 1 TAB BY MOUTH DAILY. 90 Tab 3    amLODIPine (NORVASC) 10 mg tablet TAKE 0.5 TABS BY MOUTH DAILY.  39 Tab 11     Past Medical History:   Diagnosis Date    Arthritis     Encounter for Hemoccult screening 01/24/2017    neg    Hot flash, menopausal 2015    Hypertension 2000    Menopause     Obesity 2000    Pelvic mass in female     Prediabetes 12/29/17    Right thyroid nodule 01/17/2018    Atypia of undetermined significance - pt states dr. Yaima Buenrostro said no further eval    [place]    S/P TKR (total knee replacement), left 10/07/2019    Donta Saeed MD     Past Surgical History:   Procedure Laterality Date    HX KNEE ARTHROSCOPY Right 2013    HX KNEE ARTHROSCOPY Left 2015    HX KNEE REPLACEMENT Left     HX TUBAL LIGATION       No Known Allergies      REVIEW OF SYSTEMS:  General: negative for - chills or fever  ENT: negative for - headaches, nasal congestion or tinnitus  Respiratory: negative for - cough, hemoptysis, shortness of breath or wheezing  Cardiovascular : negative for - chest pain, edema, palpitations or shortness of breath  Gastrointestinal: negative for - abdominal pain, blood in stools, heartburn or nausea/vomiting  Genito-Urinary: no dysuria, trouble voiding, or hematuria  Musculoskeletal: negative for - gait disturbance, joint pain, joint stiffness or joint swelling  Neurological: no TIA or stroke symptoms  Hematologic: no bruises, no bleeding, no swollen glands  Integument: no lumps, mole changes, nail changes or rash  Endocrine: no malaise/lethargy or unexpected weight changes      Social History     Socioeconomic History    Marital status:      Spouse name: Not on file    Number of children: Not on file    Years of education: Not on file    Highest education level: Not on file   Tobacco Use    Smoking status: Never Smoker    Smokeless tobacco: Never Used   Substance and Sexual Activity    Alcohol use: No    Drug use: Never    Sexual activity: Yes     Partners: Male   Social History Narrative    Habits: There is no history of smoking, alcohol abuse or drug abuse.           Social History:  The patient is  and lives with her . The patient is the mother of 29 25and 39year-old children and three grandchildren. 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 Her [employer] background is [employer].          Family History:  Father  at 79 of a sickness. Mother  of complications of alcoholism. She has eight siblings.      Family History   Problem Relation Age of Onset    Hypertension Mother     Diabetes Mother     No Known Problems Father     Diabetes Sister     Hypertension Sister     No Known Problems Brother     Breast Cancer Maternal Grandmother     Breast Cancer Maternal Aunt     Hypertension Sister     Other Sister         SPINA BIFIDA    No Known Problems Brother     Anesth Problems Neg Hx OBJECTIVE:    Visit Vitals  /80   Pulse 80   Temp 97.7 °F (36.5 °C) (Oral)   Resp 18   Ht 5' 7\" (1.702 m)   Wt 237 lb (107.5 kg)   SpO2 98%   BMI 37.12 kg/m²     CONSTITUTIONAL: well , well nourished, appears age appropriate  EYES: perrla, eom intact  ENMT:moist mucous membranes, pharynx clear  NECK: supple. Thyroid normal  RESPIRATORY: Chest: clear bilaterally   CARDIOVASCULAR: Heart: regular rate and rhythm  GASTROINTESTINAL: Abdomen: soft, bowel sounds active  HEMATOLOGIC: no pathological lymph nodes palpated  MUSCULOSKELETAL: Extremities: no edema, pulse 1+   INTEGUMENT: No unusual rashes or suspicious skin lesions noted. Nails appear normal.  NEUROLOGIC: non-focal exam   MENTAL STATUS: alert and oriented, appropriate affect           ASSESSMENT:  1. Arthritis of knee, left    2. Severe obesity with body mass index (BMI) of 35.0 to 39.9 with serious comorbidity ([address]    3. Right thyroid nodule    4. Prediabetes    5. Essential hypertension      Her current complaints are related to lumbar radiculopathy and will treat her with a course of prednisone, as well as give her muscle relaxant. She agrees with the plan and she will call us if it is not effective. Obesity remains a concern and since we last saw her, she has gained 5 pounds. We encourage a heart healthy, weight reducing diet. The right thyroid nodule is not changed. Prediabetes has been followed with hemoglobin A1c. In the summer it was 6.0. We remind her that the weight gain is not helpful for that diagnosis. Blood pressure control is at goal, no adjustments will be needed. She will be back to see us in for months, sooner if she has any problems. She will contact us if this is not effective. I have discussed the diagnosis with the patient and the intended plan as seen in the  orders above. The patient understands and agees with the plan.   The patient has   received an after visit summary and questions were answered concerning  future plans  Patient labs and/or xrays were reviewed  Past records were reviewed. PLAN:  .  Orders Placed This Encounter    predniSONE (DELTASONE) 20 mg tablet    cyclobenzaprine (FLEXERIL) 10 mg tablet       Follow-up and Dispositions    · Return in about 4 months (around 8/3/2020). ATTENTION:   This medical record was transcribed using an electronic medical records system. Although proofread, it may and can contain electronic and spelling errors. Other human spelling and other errors may be present. Corrections may be executed at a later time. Please feel free to contact us for any clarifications as needed.

## 2020-04-03 NOTE — PROGRESS NOTES
1. Have you been to the ER, urgent care clinic since your last visit? Hospitalized since your last visit? No    2. Have you seen or consulted any other health care providers outside of the [address] [place] [address] since your last visit? Include any pap smears or colon screening.  No    Wants to discuss hip and back pain

## 2020-04-28 RX ORDER — PAROXETINE 10 MG/1
TABLET, FILM COATED ORAL
Qty: 30 TAB | Refills: 3 | Status: SHIPPED | OUTPATIENT
Start: 2020-04-28 | End: 2020-08-07

## 2020-08-07 ENCOUNTER — OFFICE VISIT (OUTPATIENT)
Dept: INTERNAL MEDICINE CLINIC | Age: 60
End: 2020-08-07
Payer: COMMERCIAL

## 2020-08-07 VITALS
WEIGHT: 240.1 LBS | DIASTOLIC BLOOD PRESSURE: 83 MMHG | HEART RATE: 81 BPM | RESPIRATION RATE: 18 BRPM | HEIGHT: 67 IN | BODY MASS INDEX: 37.69 KG/M2 | TEMPERATURE: 98 F | OXYGEN SATURATION: 97 % | SYSTOLIC BLOOD PRESSURE: 115 MMHG

## 2020-08-07 DIAGNOSIS — R73.03 PREDIABETES: ICD-10-CM

## 2020-08-07 DIAGNOSIS — M17.12 ARTHRITIS OF KNEE, LEFT: Primary | ICD-10-CM

## 2020-08-07 DIAGNOSIS — I10 ESSENTIAL HYPERTENSION: ICD-10-CM

## 2020-08-07 DIAGNOSIS — E66.01 SEVERE OBESITY WITH BODY MASS INDEX (BMI) OF 35.0 TO 39.9 WITH SERIOUS COMORBIDITY (HCC): ICD-10-CM

## 2020-08-07 DIAGNOSIS — E04.1 RIGHT THYROID NODULE: ICD-10-CM

## 2020-08-07 PROCEDURE — 99214 OFFICE O/P EST MOD 30 MIN: CPT | Performed by: INTERNAL MEDICINE

## 2020-08-07 PROCEDURE — 36415 COLL VENOUS BLD VENIPUNCTURE: CPT | Performed by: INTERNAL MEDICINE

## 2020-08-07 RX ORDER — AMLODIPINE BESYLATE 10 MG/1
5 TABLET ORAL DAILY
Qty: 90 TAB | Refills: 3 | Status: SHIPPED | OUTPATIENT
Start: 2020-08-07 | End: 2021-08-19

## 2020-08-07 RX ORDER — PAROXETINE 10 MG/1
TABLET, FILM COATED ORAL
Qty: 90 TAB | Refills: 3 | Status: SHIPPED | OUTPATIENT
Start: 2020-08-07 | End: 2021-02-24

## 2020-08-07 NOTE — ASSESSMENT & PLAN NOTE
Stable, based on history, physical exam and review of pertinent labs, studies and medications; meds reconciled; continue current treatment plan. Key Obesity Meds             hydroCHLOROthiazide (HYDRODIURIL) 12.5 mg tablet (Taking) TAKE 1 TAB BY MOUTH DAILY.         Lab Results   Component Value Date/Time    Hemoglobin A1c 6.0 09/30/2019 08:42 AM    Glucose 126 10/08/2019 02:51 AM    Sodium 140 10/08/2019 02:51 AM    Potassium 3.9 10/08/2019 02:51 AM    ALT (SGPT) 13 07/18/2019 10:29 AM

## 2020-08-07 NOTE — PROGRESS NOTES
1. Have you been to the ER, urgent care clinic since your last visit? Hospitalized since your last visit? No    2. Have you seen or consulted any other health care providers outside of the [address] [place] [address] since your last visit? Include any pap smears or colon screening.  No

## 2020-08-07 NOTE — PROGRESS NOTES
SPORTS MEDICINE AND PRIMARY CARE  Danielito Fermin MD, [address] [address]  Phone:  [phone]  Fax: [phone]       Chief Complaint   Patient presents with    Hypertension   . SUBJECTIVE:    Diane Sandhu is a 62 y.o. female Patient returns today with no new complaints. She states, \"Everything is just fine\". She has a known history of arthritis, particularly of the left knee, obesity, which she is actively working on, status post left TKR by Dr. Jacome Form, right thyroid nodule, prediabetes, primary hypertension, and is seen for evaluation. Current Outpatient Medications   Medication Sig Dispense Refill    PARoxetine (PAXIL) 10 mg tablet TAKE 1 TABLET BY MOUTH EVERY DAY 90 Tab 3    amLODIPine (NORVASC) 10 mg tablet Take 0.5 Tabs by mouth daily. 90 Tab 3    hydroCHLOROthiazide (HYDRODIURIL) 12.5 mg tablet TAKE 1 TAB BY MOUTH DAILY.  80 Tab 3     Past Medical History:   Diagnosis Date    Arthritis     Encounter for Hemoccult screening 01/24/2017    neg    Hot flash, menopausal 2015    Hypertension 2000    Menopause     Obesity 2000    Pelvic mass in female     Prediabetes 12/29/17    Right thyroid nodule 01/17/2018    Atypia of undetermined significance - pt states dr. Griselda Smith said no further eval    [place]    S/P TKR (total knee replacement), left 10/07/2019    Viji Kulkarni MD     Past Surgical History:   Procedure Laterality Date    HX KNEE ARTHROSCOPY Right 2013    HX KNEE ARTHROSCOPY Left 2015    HX KNEE REPLACEMENT Left     HX TUBAL LIGATION       No Known Allergies      REVIEW OF SYSTEMS:  General: negative for - chills or fever  ENT: negative for - headaches, nasal congestion or tinnitus  Respiratory: negative for - cough, hemoptysis, shortness of breath or wheezing  Cardiovascular : negative for - chest pain, edema, palpitations or shortness of breath  Gastrointestinal: negative for - abdominal pain, blood in stools, heartburn or nausea/vomiting  Genito-Urinary: no dysuria, trouble voiding, or hematuria  Musculoskeletal: negative for - gait disturbance, joint pain, joint stiffness or joint swelling  Neurological: no TIA or stroke symptoms  Hematologic: no bruises, no bleeding, no swollen glands  Integument: no lumps, mole changes, nail changes or rash  Endocrine: no malaise/lethargy or unexpected weight changes      Social History     Socioeconomic History    Marital status:      Spouse name: Not on file    Number of children: Not on file    Years of education: Not on file    Highest education level: Not on file   Tobacco Use    Smoking status: Never Smoker    Smokeless tobacco: Never Used   Substance and Sexual Activity    Alcohol use: No    Drug use: Never    Sexual activity: Yes     Partners: Male   Social History Narrative    Habits: There is no history of smoking, alcohol abuse or drug abuse.           Social History:  The patient is  and lives with her . The patient is the mother of 29 25and 39year-old children and three grandchildren. 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 Her [employer] background is [employer].          Family History:  Father  at 79 of a sickness. Mother  of complications of alcoholism. She has eight siblings.      Family History   Problem Relation Age of Onset    Hypertension Mother     Diabetes Mother     No Known Problems Father     Diabetes Sister     Hypertension Sister     No Known Problems Brother     Breast Cancer Maternal Grandmother     Breast Cancer Maternal Aunt     Hypertension Sister     Other Sister         SPINA BIFIDA    No Known Problems Brother     Anesth Problems Neg Hx        OBJECTIVE:    Visit Vitals  /83   Pulse 81   Temp 98 °F (36.7 °C) (Oral)   Resp 18   Ht 5' 7\" (1.702 m)   Wt 240 lb 1.6 oz (108.9 kg)   SpO2 97%   BMI 37.60 kg/m²     CONSTITUTIONAL: well , well nourished, appears age appropriate  EYES: perrla, eom intact  ENMT:moist mucous membranes, pharynx clear  NECK: supple. Thyroid normal  RESPIRATORY: Chest: clear bilaterally   CARDIOVASCULAR: Heart: regular rate and rhythm  GASTROINTESTINAL: Abdomen: soft, bowel sounds active  HEMATOLOGIC: no pathological lymph nodes palpated  MUSCULOSKELETAL: Extremities: no edema, pulse 1+   INTEGUMENT: No unusual rashes or suspicious skin lesions noted. Nails appear normal.  NEUROLOGIC: non-focal exam   MENTAL STATUS: alert and oriented, appropriate affect           ASSESSMENT:  1. Arthritis of knee, left    2. Severe obesity with body mass index (BMI) of 35.0 to 39.9 with serious comorbidity ([address]    3. Right thyroid nodule    4. Prediabetes    5. Essential hypertension      Patient's medical status is stable, blood pressure control is at goal, no adjustments are needed. She is doing well from her total knee replacement, but still walks with a limp and is very disgusted and is going to actively stop walking with the limp as time goes on. We remain concerned about her weight and since we last saw her, she made a decision to gain another 3 pounds. We certainly encouraged her to start going the opposite way and is to adhere to a heart healthy, weight reducing diet, with physical activity 30 minutes five days a week. The Paxil seems to be helping the hot flashes somewhat. Appropriate laboratory studies will be checked. Dr. Molly Valentin did her pap smear. Mammograms will be done at her convenience. She states, \"I'm afraid to go to the hospital right now because of the [place] virus\", and so she will have it done at her convenience. She will be back to see us in four to six months, sooner if needed. I have discussed the diagnosis with the patient and the intended plan as seen in the  orders above. The patient understands and agees with the plan.   The patient has   received an after visit summary and questions were answered concerning  future plans  Patient labs and/or xrays were reviewed  Past records were reviewed. PLAN:  .  Orders Placed This Encounter    AC MAMMO BI SCREENING INCL CAD    URINALYSIS W/ RFLX MICROSCOPIC    CBC WITH AUTOMATED DIFF    METABOLIC PANEL, COMPREHENSIVE    LIPID PANEL    TSH 3RD GENERATION    HEMOGLOBIN A1C WITH EAG    PARoxetine (PAXIL) 10 mg tablet    amLODIPine (NORVASC) 10 mg tablet       Follow-up and Dispositions    · Return in about 4 months (around 12/7/2020). ATTENTION:   This medical record was transcribed using an electronic medical records system. Although proofread, it may and can contain electronic and spelling errors. Other human spelling and other errors may be present. Corrections may be executed at a later time. Please feel free to contact us for any clarifications as needed.

## 2020-08-08 LAB
ALBUMIN SERPL-MCNC: 4.6 G/DL (ref 3.8–4.9)
ALBUMIN/GLOB SERPL: 1.5 {RATIO} (ref 1.2–2.2)
ALP SERPL-CCNC: 71 IU/L (ref 39–117)
ALT SERPL-CCNC: 14 IU/L (ref 0–32)
APPEARANCE UR: CLEAR
AST SERPL-CCNC: 14 IU/L (ref 0–40)
BASOPHILS # BLD AUTO: 0.1 X10E3/UL (ref 0–0.2)
BASOPHILS NFR BLD AUTO: 1 %
BILIRUB SERPL-MCNC: 0.4 MG/DL (ref 0–1.2)
BILIRUB UR QL STRIP: NEGATIVE
BUN SERPL-MCNC: 23 MG/DL (ref 6–24)
BUN/CREAT SERPL: 27 (ref 9–23)
CALCIUM SERPL-MCNC: 10.6 MG/DL (ref 8.7–10.2)
CHLORIDE SERPL-SCNC: 102 MMOL/L (ref 96–106)
CHOLEST SERPL-MCNC: 187 MG/DL (ref 100–199)
CO2 SERPL-SCNC: 26 MMOL/L (ref 20–29)
COLOR UR: YELLOW
CREAT SERPL-MCNC: 0.84 MG/DL (ref 0.57–1)
EOSINOPHIL # BLD AUTO: 0.2 X10E3/UL (ref 0–0.4)
EOSINOPHIL NFR BLD AUTO: 5 %
ERYTHROCYTE [DISTWIDTH] IN BLOOD BY AUTOMATED COUNT: 14.2 % (ref 11.7–15.4)
EST. AVERAGE GLUCOSE BLD GHB EST-MCNC: 131 MG/DL
GLOBULIN SER CALC-MCNC: 3 G/DL (ref 1.5–4.5)
GLUCOSE SERPL-MCNC: 99 MG/DL (ref 65–99)
GLUCOSE UR QL: NEGATIVE
HBA1C MFR BLD: 6.2 % (ref 4.8–5.6)
HCT VFR BLD AUTO: 43.2 % (ref 34–46.6)
HDLC SERPL-MCNC: 63 MG/DL
HGB BLD-MCNC: 14.2 G/DL (ref 11.1–15.9)
HGB UR QL STRIP: NEGATIVE
IMM GRANULOCYTES # BLD AUTO: 0 X10E3/UL (ref 0–0.1)
IMM GRANULOCYTES NFR BLD AUTO: 0 %
KETONES UR QL STRIP: NEGATIVE
LDLC SERPL CALC-MCNC: 102 MG/DL (ref 0–99)
LEUKOCYTE ESTERASE UR QL STRIP: NEGATIVE
LYMPHOCYTES # BLD AUTO: 1.7 X10E3/UL (ref 0.7–3.1)
LYMPHOCYTES NFR BLD AUTO: 34 %
MCH RBC QN AUTO: 25.7 PG (ref 26.6–33)
MCHC RBC AUTO-ENTMCNC: 32.9 G/DL (ref 31.5–35.7)
MCV RBC AUTO: 78 FL (ref 79–97)
MICRO URNS: NORMAL
MONOCYTES # BLD AUTO: 0.5 X10E3/UL (ref 0.1–0.9)
MONOCYTES NFR BLD AUTO: 11 %
NEUTROPHILS # BLD AUTO: 2.4 X10E3/UL (ref 1.4–7)
NEUTROPHILS NFR BLD AUTO: 49 %
NITRITE UR QL STRIP: NEGATIVE
PH UR STRIP: 5.5 [PH] (ref 5–7.5)
PLATELET # BLD AUTO: 347 X10E3/UL (ref 150–450)
POTASSIUM SERPL-SCNC: 4.7 MMOL/L (ref 3.5–5.2)
PROT SERPL-MCNC: 7.6 G/DL (ref 6–8.5)
PROT UR QL STRIP: NEGATIVE
RBC # BLD AUTO: 5.53 X10E6/UL (ref 3.77–5.28)
SODIUM SERPL-SCNC: 142 MMOL/L (ref 134–144)
SP GR UR: 1.02 (ref 1–1.03)
TRIGL SERPL-MCNC: 112 MG/DL (ref 0–149)
TSH SERPL DL<=0.005 MIU/L-ACNC: 1.91 UIU/ML (ref 0.45–4.5)
UROBILINOGEN UR STRIP-MCNC: 0.2 MG/DL (ref 0.2–1)
VLDLC SERPL CALC-MCNC: 22 MG/DL (ref 5–40)
WBC # BLD AUTO: 4.9 X10E3/UL (ref 3.4–10.8)

## 2020-08-14 ENCOUNTER — OFFICE VISIT (OUTPATIENT)
Dept: INTERNAL MEDICINE CLINIC | Age: 60
End: 2020-08-14
Payer: COMMERCIAL

## 2020-08-14 VITALS
OXYGEN SATURATION: 97 % | DIASTOLIC BLOOD PRESSURE: 81 MMHG | BODY MASS INDEX: 37.89 KG/M2 | SYSTOLIC BLOOD PRESSURE: 119 MMHG | HEIGHT: 67 IN | RESPIRATION RATE: 16 BRPM | WEIGHT: 241.4 LBS | TEMPERATURE: 98.3 F | HEART RATE: 72 BPM

## 2020-08-14 DIAGNOSIS — M25.552 LEFT HIP PAIN: ICD-10-CM

## 2020-08-14 DIAGNOSIS — Z12.39 BREAST CANCER SCREENING: ICD-10-CM

## 2020-08-14 DIAGNOSIS — Z01.419 GYNECOLOGIC EXAM NORMAL: Primary | ICD-10-CM

## 2020-08-14 DIAGNOSIS — M19.90 ARTHRITIS: ICD-10-CM

## 2020-08-14 DIAGNOSIS — Z23 ENCOUNTER FOR IMMUNIZATION: ICD-10-CM

## 2020-08-14 PROCEDURE — 90471 IMMUNIZATION ADMIN: CPT

## 2020-08-14 PROCEDURE — 99213 OFFICE O/P EST LOW 20 MIN: CPT | Performed by: FAMILY MEDICINE

## 2020-08-14 PROCEDURE — 99396 PREV VISIT EST AGE 40-64: CPT | Performed by: FAMILY MEDICINE

## 2020-08-14 PROCEDURE — 90750 HZV VACC RECOMBINANT IM: CPT

## 2020-08-14 RX ORDER — NAPROXEN 500 MG/1
500 TABLET ORAL 2 TIMES DAILY WITH MEALS
Qty: 60 TAB | Refills: 0 | Status: SHIPPED | OUTPATIENT
Start: 2020-08-14 | End: 2021-02-24

## 2020-08-14 NOTE — PROGRESS NOTES
Chief Complaint   Patient presents with   Stewart Jeannie Exam       1. Have you been to the ER, urgent care clinic since your last visit? Hospitalized since your last visit? No    2. Have you seen or consulted any other health care providers outside of the [address] [place] [address] since your last visit? Include any pap smears or colon screening.  No

## 2020-08-14 NOTE — PROGRESS NOTES
SPORTS MEDICINE AND PRIMARY CARE  Carole Chow MD  [address] [address]    Chief Complaint   Patient presents with    Gyn Exam       SUBJECTIVE:    Kesha Albert is a 62 y.o. female for routine GYN exam.  She is a patient of Kendal Ferrell MD.    Patient reports left hip discomfort that seem to begin after left TKR . She describes an ache in the region of the left SI joint aggravated by walking, sitting and position changes. LMP : age 46 yo  Postmenopausal bleeding: no  Menstrual irregularity:no  Abnormal pap history: no  Last pap smear: 2 yr ago, at [facility]" in the [place]  Abnormal mammogram history: no  Last mammogram date:   Breast pain or mass: no  Colonoscopy: normal study under 10 yr; told to return in 10 yr  DEXA scan and T-score: no prior study no  GYN/ diagnoses: fibroids  GYN/ surgery: BTL  Family hx of female cancer: maternal aunt: breast cancer  Gravity/parity:   x 3  Contraception Hx:ocp     Menarche: 16  yo  Sexually active: yes,   Condoms: no  Vaginitis:no  Urinary complaints: no     1. Do you follow a low fat diet? yes  2. Are you up to date on your Tdap (<10 years)?  no, defers  3. Have you ever had a Pneumovax vaccine (>65)  na              PCV13               PPSV23   4. Have you had Zoster vaccine (>50)? no  5. Have you had the HPV - Gardasil (13- 46)? no  7. Do you smoke?  no  8. Do you consider yourself overweight?  yes  9. Is there a family history of CAD< age 48? no  8. Is there a family history of Cancer?  aunts: stomach and breast  11. Do you know your Cancer risks?  no  12. Have you had a colonoscopy? yes,done in [place]  13. Have you been tested for HIV or other STI's?  (18-71 y/o)? Yes, done in [address] [place] [address].  Have had a bone density scan or DEXA done(>65)? na  15. Have you had an EKG performed in the last five years (>50)?   yes, left atrial enlargement    Current Outpatient Medications   Medication Sig Dispense Refill    PARoxetine (PAXIL) 10 mg tablet TAKE 1 TABLET BY MOUTH EVERY DAY 90 Tab 3    amLODIPine (NORVASC) 10 mg tablet Take 0.5 Tabs by mouth daily. 90 Tab 3    hydroCHLOROthiazide (HYDRODIURIL) 12.5 mg tablet TAKE 1 TAB BY MOUTH DAILY.  80 Tab 3     Past Medical History:   Diagnosis Date    Arthritis     Encounter for Hemoccult screening 01/24/2017    neg    Hot flash, menopausal 2015    Hypertension 2000    Menopause     Obesity 2000    Pelvic mass in female     Prediabetes 12/29/17    Right thyroid nodule 01/17/2018    Atypia of undetermined significance - pt states dr. Bethanie Inman said no further eval    [place]    S/P TKR (total knee replacement), left 10/07/2019    Kyle Merino MD     Past Surgical History:   Procedure Laterality Date    HX KNEE ARTHROSCOPY Right 2013    HX KNEE ARTHROSCOPY Left 2015    HX KNEE REPLACEMENT Left     HX TUBAL LIGATION       No Known Allergies    REVIEW OF SYSTEMS:  General: negative for - chills or fever  ENT: negative for - headaches, nasal congestion or tinnitus  Respiratory: negative for - cough, hemoptysis, shortness of breath or wheezing  Cardiovascular : negative for - chest pain, edema, palpitations or shortness of breath  Gastrointestinal: negative for - abdominal pain, blood in stools, heartburn or nausea/vomiting  Genito-Urinary: no dysuria, trouble voiding, or hematuria  Musculoskeletal: +joint pain, joint stiffness; negative for - gait disturbance, s or joint swelling  Neurological: no TIA or stroke symptoms  Hematologic: no bruises, no bleeding, no swollen glands  Integument: no lumps, mole changes, nail changes or rash  Endocrine:no malaise/lethargy or unexpected weight changes      Social History     Socioeconomic History    Marital status:      Spouse name: Not on file    Number of children: Not on file    Years of education: Not on file    Highest education level: Not on file   Tobacco Use    Smoking status: Never Smoker    Smokeless tobacco: Never Used   Substance and Sexual Activity    Alcohol use: No    Drug use: Never    Sexual activity: Yes     Partners: Male   Social History Narrative    Habits: There is no history of smoking, alcohol abuse or drug abuse.           Social History:  The patient is  and lives with her . The patient is the mother of 29 25and 39year-old children and three grandchildren. 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 Her [employer]ism background is [employer].          Family History:  Father  at 79 of a sickness. Mother  of complications of alcoholism. She has eight siblings. Family History   Problem Relation Age of Onset    Hypertension Mother     Diabetes Mother     No Known Problems Father     Diabetes Sister     Hypertension Sister     No Known Problems Brother     Breast Cancer Maternal Grandmother     Breast Cancer Maternal Aunt     Hypertension Sister     Other Sister         SPINA BIFIDA    No Known Problems Brother     Anesth Problems Neg Hx        OBJECTIVE:     Visit Vitals  /81   Pulse 72   Temp 98.3 °F (36.8 °C)   Resp 16   Ht 5' 7\" (1.702 m)   Wt 241 lb 6.4 oz (109.5 kg)   SpO2 97%   BMI 37.81 kg/m²     CONSTITUTIONAL: well developed, obese, appears age appropriate, NAD  EYES: perrla, eom intact  ENMT:moist mucous membranes, pharynx clear  NECK: supple. Thyroid normal , no jvd, no bruit  RESPIRATORY: Chest: clear bilaterally  Breasts - breasts appear normal, no suspicious masses, no skin or nipple changes or axillary nodes  Pelvic - normal external genitalia, vulva, vagina, cervix, uterus and adnexa   CARDIOVASCULAR: Heart: regular rate and rhythm  GASTROINTESTINAL: Abdomen: soft, bowel sounds active  HEMATOLOGIC: no pathological lymph nodes palpated  MUSCULOSKELETAL: Extremities: no edema, pulse 1+   INTEGUMENT: No unusual rashes or suspicious skin lesions noted.  Nails appear normal.  NEUROLOGIC: non-focal exam   MENTAL STATUS: alert and oriented, appropriate affect     Office Visit on 08/07/2020   Component Date Value Ref Range Status    Specific Gravity 08/07/2020 1.016  1.005 - 1.030 Final    pH (UA) 08/07/2020 5.5  5.0 - 7.5 Final    Color 08/07/2020 Yellow  Yellow Final    Appearance 08/07/2020 Clear  Clear Final    Leukocyte Esterase 08/07/2020 Negative  Negative Final    Protein 08/07/2020 Negative  Negative/Trace Final    Glucose 08/07/2020 Negative  Negative Final    Ketone 08/07/2020 Negative  Negative Final    Blood 08/07/2020 Negative  Negative Final    Bilirubin 08/07/2020 Negative  Negative Final    Urobilinogen 08/07/2020 0.2  0.2 - 1.0 mg/dL Final    Nitrites 08/07/2020 Negative  Negative Final    Microscopic Examination 08/07/2020 Comment   Final    Microscopic not indicated and not performed.  WBC 08/07/2020 4.9  3.4 - 10.8 x10E3/uL Final    RBC 08/07/2020 5.53* 3.77 - 5.28 x10E6/uL Final    HGB 08/07/2020 14.2  11.1 - 15.9 g/dL Final    HCT 08/07/2020 43.2  34.0 - 46.6 % Final    MCV 08/07/2020 78* 79 - 97 fL Final    MCH 08/07/2020 25.7* 26.6 - 33.0 pg Final    MCHC 08/07/2020 32.9  31.5 - 35.7 g/dL Final    RDW 08/07/2020 14.2  11.7 - 15.4 % Final    PLATELET 49/01/3647 942  150 - 450 x10E3/uL Final    NEUTROPHILS 08/07/2020 49  Not Estab. % Final    Lymphocytes 08/07/2020 34  Not Estab. % Final    MONOCYTES 08/07/2020 11  Not Estab. % Final    EOSINOPHILS 08/07/2020 5  Not Estab. % Final    BASOPHILS 08/07/2020 1  Not Estab. % Final    ABS. NEUTROPHILS 08/07/2020 2.4  1.4 - 7.0 x10E3/uL Final    Abs Lymphocytes 08/07/2020 1.7  0.7 - 3.1 x10E3/uL Final    ABS. MONOCYTES 08/07/2020 0.5  0.1 - 0.9 x10E3/uL Final    ABS. EOSINOPHILS 08/07/2020 0.2  0.0 - 0.4 x10E3/uL Final    ABS. BASOPHILS 08/07/2020 0.1  0.0 - 0.2 x10E3/uL Final    IMMATURE GRANULOCYTES 08/07/2020 0  Not Estab. % Final    ABS. IMM.  GRANS. 08/07/2020 0.0  0.0 - 0.1 x10E3/uL Final    Glucose 08/07/2020 99  65 - 99 mg/dL Final    BUN 08/07/2020 23  6 - 24 mg/dL Final    Creatinine 08/07/2020 0.84  0.57 - 1.00 mg/dL Final    GFR est non-AA 08/07/2020 76  >59 mL/min/1.73 Final    GFR est AA 08/07/2020 88  >59 mL/min/1.73 Final    BUN/Creatinine ratio 08/07/2020 27* 9 - 23 Final    Sodium 08/07/2020 142  134 - 144 mmol/L Final    Potassium 08/07/2020 4.7  3.5 - 5.2 mmol/L Final    Chloride 08/07/2020 102  96 - 106 mmol/L Final    CO2 08/07/2020 26  20 - 29 mmol/L Final    Calcium 08/07/2020 10.6* 8.7 - 10.2 mg/dL Final    Protein, total 08/07/2020 7.6  6.0 - 8.5 g/dL Final    Albumin 08/07/2020 4.6  3.8 - 4.9 g/dL Final    GLOBULIN, TOTAL 08/07/2020 3.0  1.5 - 4.5 g/dL Final    A-G Ratio 08/07/2020 1.5  1.2 - 2.2 Final    Bilirubin, total 08/07/2020 0.4  0.0 - 1.2 mg/dL Final    Alk. phosphatase 08/07/2020 71  39 - 117 IU/L Final    AST (SGOT) 08/07/2020 14  0 - 40 IU/L Final    ALT (SGPT) 08/07/2020 14  0 - 32 IU/L Final    Cholesterol, total 08/07/2020 187  100 - 199 mg/dL Final    Triglyceride 08/07/2020 112  0 - 149 mg/dL Final    HDL Cholesterol 08/07/2020 63  >39 mg/dL Final    VLDL, calculated 08/07/2020 22  5 - 40 mg/dL Final    LDL, calculated 08/07/2020 102* 0 - 99 mg/dL Final    TSH 08/07/2020 1.910  0.450 - 4.500 uIU/mL Final    Hemoglobin A1c 08/07/2020 6.2* 4.8 - 5.6 % Final    Comment:          Prediabetes: 5.7 - 6.4           Diabetes: >6.4           Glycemic control for adults with diabetes: <7.0      Estimated average glucose 08/07/2020 131  mg/dL Final       ASSESSMENT:   1. Gynecologic exam normal    2. Breast cancer screening    3. Left hip arthralgia, suspect arthritis    I have discussed the diagnosis with the patient and the intended plan as seen in the  orders. The patient understands and agees with the plan.   The patient has   received an after visit summary and questions were answered concerning  future plans  Patient labs and/or xrays were reviewed  Past records were reviewed. PLAN:  .  Orders Placed This Encounter    AC MAMMO BI SCREENING INCL CAD    NUSWAB VAGINITIS    PAP IG, APTIMA HPV AND RFX 16/18,45 ([id number])   Trial of Naprosyn 500 mg twice daily as needed only #60 refill x0  Counseled regarding diet, exercise and healthy lifestyle  Shingrix #1  RTO for shoingrix #2  in 8 wks    Signed,  Alease Riedel M.D. This note was created using voice recognition software.   Edits have been made but syntax errors might exist.

## 2020-08-20 ENCOUNTER — HOSPITAL ENCOUNTER (OUTPATIENT)
Dept: MAMMOGRAPHY | Age: 60
Discharge: HOME | End: 2020-08-20
Attending: FAMILY MEDICINE
Payer: COMMERCIAL

## 2020-08-20 DIAGNOSIS — Z12.39 BREAST CANCER SCREENING: ICD-10-CM

## 2020-08-20 PROCEDURE — 77067 SCR MAMMO BI INCL CAD: CPT

## 2020-08-21 LAB
A VAGINAE DNA VAG QL NAA+PROBE: ABNORMAL SCORE
BVAB2 DNA VAG QL NAA+PROBE: ABNORMAL SCORE
C ALBICANS DNA VAG QL NAA+PROBE: NEGATIVE
C GLABRATA DNA VAG QL NAA+PROBE: NEGATIVE
CYTOLOGIST CVX/VAG CYTO: NORMAL
CYTOLOGY CVX/VAG DOC CYTO: NORMAL
CYTOLOGY CVX/VAG DOC THIN PREP: NORMAL
DX ICD CODE: NORMAL
HPV I/H RISK 4 DNA CVX QL PROBE+SIG AMP: NEGATIVE
Lab: NORMAL
Lab: NORMAL
MEGA1 DNA VAG QL NAA+PROBE: ABNORMAL SCORE
OTHER STN SPEC: NORMAL
STAT OF ADQ CVX/VAG CYTO-IMP: NORMAL
T VAGINALIS DNA VAG QL NAA+PROBE: NEGATIVE

## 2020-09-03 DIAGNOSIS — N76.0 BV (BACTERIAL VAGINOSIS): Primary | ICD-10-CM

## 2020-09-03 DIAGNOSIS — B96.89 BV (BACTERIAL VAGINOSIS): Primary | ICD-10-CM

## 2020-09-03 RX ORDER — METRONIDAZOLE 500 MG/1
500 TABLET ORAL 2 TIMES DAILY
Qty: 14 TAB | Refills: 0 | Status: SHIPPED | OUTPATIENT
Start: 2020-09-03 | End: 2021-02-24

## 2020-09-03 NOTE — PROGRESS NOTES
pls call normal pap  Repeat in 5 yr unless symptoms develop    BV needs rx  Med available at pharmacy

## 2020-09-17 NOTE — ADDENDUM NOTE
Addended by: Dameon Saab on: 8/14/2020 03:10 PM     Modules accepted: Orders, SmartSet PATIENT CALLED AND WANTED TO KNOW IF HE IS SUPPOSED TO CONTINUE PHYSICAL THERAPY? PATIENT CAN BE REACHED @ [phone]

## 2020-12-18 RX ORDER — HYDROCHLOROTHIAZIDE 12.5 MG/1
TABLET ORAL
Qty: 90 TAB | Refills: 3 | Status: SHIPPED | OUTPATIENT
Start: 2020-12-18 | End: 2021-12-16

## 2021-02-24 ENCOUNTER — OFFICE VISIT (OUTPATIENT)
Dept: INTERNAL MEDICINE CLINIC | Age: 61
End: 2021-02-24
Payer: COMMERCIAL

## 2021-02-24 VITALS
TEMPERATURE: 97.8 F | SYSTOLIC BLOOD PRESSURE: 117 MMHG | RESPIRATION RATE: 18 BRPM | DIASTOLIC BLOOD PRESSURE: 83 MMHG | BODY MASS INDEX: 37.51 KG/M2 | HEIGHT: 67 IN | HEART RATE: 76 BPM | OXYGEN SATURATION: 97 % | WEIGHT: 239 LBS

## 2021-02-24 DIAGNOSIS — R73.03 PREDIABETES: ICD-10-CM

## 2021-02-24 DIAGNOSIS — E04.1 RIGHT THYROID NODULE: ICD-10-CM

## 2021-02-24 DIAGNOSIS — I10 ESSENTIAL HYPERTENSION: Primary | ICD-10-CM

## 2021-02-24 DIAGNOSIS — M17.12 ARTHRITIS OF KNEE, LEFT: ICD-10-CM

## 2021-02-24 DIAGNOSIS — E66.01 SEVERE OBESITY WITH BODY MASS INDEX (BMI) OF 35.0 TO 39.9 WITH SERIOUS COMORBIDITY (HCC): ICD-10-CM

## 2021-02-24 DIAGNOSIS — Z96.652 S/P TKR (TOTAL KNEE REPLACEMENT), LEFT: ICD-10-CM

## 2021-02-24 LAB
ALBUMIN SERPL-MCNC: 3.9 G/DL (ref 3.5–5)
ANION GAP SERPL CALC-SCNC: 8 MMOL/L (ref 5–15)
BUN SERPL-MCNC: 16 MG/DL (ref 6–20)
BUN/CREAT SERPL: 17 (ref 12–20)
CALCIUM SERPL-MCNC: 10.5 MG/DL (ref 8.5–10.1)
CALCIUM SERPL-MCNC: 9.9 MG/DL (ref 8.5–10.1)
CHLORIDE SERPL-SCNC: 107 MMOL/L (ref 97–108)
CO2 SERPL-SCNC: 26 MMOL/L (ref 21–32)
COMMENT, HOLDF: NORMAL
CREAT SERPL-MCNC: 0.92 MG/DL (ref 0.55–1.02)
GLUCOSE SERPL-MCNC: 93 MG/DL (ref 65–100)
PHOSPHATE SERPL-MCNC: 3.8 MG/DL (ref 2.6–4.7)
POTASSIUM SERPL-SCNC: 4.3 MMOL/L (ref 3.5–5.1)
PTH-INTACT SERPL-MCNC: 60.4 PG/ML (ref 18.4–88)
SAMPLES BEING HELD,HOLD: NORMAL
SODIUM SERPL-SCNC: 141 MMOL/L (ref 136–145)

## 2021-02-24 PROCEDURE — 99214 OFFICE O/P EST MOD 30 MIN: CPT | Performed by: INTERNAL MEDICINE

## 2021-02-24 RX ORDER — TERCONAZOLE 4 MG/G
1 CREAM VAGINAL
Qty: 45 G | Refills: 0 | Status: SHIPPED | OUTPATIENT
Start: 2021-02-24

## 2021-02-24 NOTE — PROGRESS NOTES
1. Have you been to the ER, urgent care clinic since your last visit? Hospitalized since your last visit? No    2. Have you seen or consulted any other health care providers outside of the [address] [place] [address] since your last visit? Include any pap smears or colon screening.  No

## 2021-02-24 NOTE — PROGRESS NOTES
SPORTS MEDICINE AND PRIMARY CARE  Evelyn Grady MD, [address] [address]  Phone:  [phone]  Fax: [phone]       Chief Complaint   Patient presents with    Hypertension   . SUBJECTIVE:    Mirta Pepper is a 62 y.o. female Patient returns with a known history of primary hypertension, prediabetes, right thyroid nodule, arthritis left knee, status post left total knee replacement, obesity, and is seen for evaluation. Patient returns today complaining of vaginal pruritus, minimal white discharge. Denies other specific complaints and is seen for evaluation. Current Outpatient Medications   Medication Sig Dispense Refill    terconazole (TERAZOL 7) 0.4 % vaginal cream Insert 1 Applicator into vagina nightly. 45 g 0    hydroCHLOROthiazide (HYDRODIURIL) 12.5 mg tablet TAKE 1 TAB BY MOUTH DAILY. 90 Tab 3    amLODIPine (NORVASC) 10 mg tablet Take 0.5 Tabs by mouth daily.  80 Tab 3     Past Medical History:   Diagnosis Date    Arthritis     Encounter for Hemoccult screening 01/24/2017    neg    Hot flash, menopausal 2015    Hypertension 2000    Menopause     Obesity 2000    Pelvic mass in female     Prediabetes 12/29/17    Right thyroid nodule 01/17/2018    Atypia of undetermined significance - pt states dr. Tre Zhu said no further eval    [place]    S/P TKR (total knee replacement), left 10/07/2019    Marisel Joaquin MD     Past Surgical History:   Procedure Laterality Date    HX KNEE ARTHROSCOPY Right 2013    HX KNEE ARTHROSCOPY Left 2015    HX KNEE REPLACEMENT Left     HX TUBAL LIGATION       No Known Allergies      REVIEW OF SYSTEMS:  General: negative for - chills or fever  ENT: negative for - headaches, nasal congestion or tinnitus  Respiratory: negative for - cough, hemoptysis, shortness of breath or wheezing  Cardiovascular : negative for - chest pain, edema, palpitations or shortness of breath  Gastrointestinal: negative for - abdominal pain, blood in stools, heartburn or nausea/vomiting  Genito-Urinary: no dysuria, trouble voiding, or hematuria  Musculoskeletal: negative for - gait disturbance, joint pain, joint stiffness or joint swelling  Neurological: no TIA or stroke symptoms  Hematologic: no bruises, no bleeding, no swollen glands  Integument: no lumps, mole changes, nail changes or rash  Endocrine: no malaise/lethargy or unexpected weight changes      Social History     Socioeconomic History    Marital status:      Spouse name: Not on file    Number of children: Not on file    Years of education: Not on file    Highest education level: Not on file   Tobacco Use    Smoking status: Never Smoker    Smokeless tobacco: Never Used   Substance and Sexual Activity    Alcohol use: Yes     Frequency: Monthly or less     Drinks per session: 1 or 2    Drug use: Never    Sexual activity: Yes     Partners: Male   Social History Narrative    Habits: There is no history of smoking, alcohol abuse or drug abuse.           Social History:  The patient is  and lives with her . The patient is the mother of 29 25and 39year-old children and three grandchildren. 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 Her Rast[employer]arian background is [employer].          Family History:  Father  at 79 of a sickness. Mother  of complications of alcoholism. She has eight siblings.      Family History   Problem Relation Age of Onset    Hypertension Mother     Diabetes Mother     No Known Problems Father     Diabetes Sister     Hypertension Sister     No Known Problems Brother     Breast Cancer Maternal Aunt     Hypertension Sister     Other Sister         SPINA BIFIDA    No Known Problems Brother     Anesth Problems Neg Hx        OBJECTIVE:    Visit Vitals  /83   Pulse 76   Temp 97.8 °F (36.6 °C) (Oral)   Resp 18   Ht 5' 7\" (1.702 m)   Wt 239 lb (108.4 kg)   SpO2 97%   BMI 37.43 kg/m²     CONSTITUTIONAL: well , well nourished, appears age appropriate  EYES: perrla, eom intact  ENMT:moist mucous membranes, pharynx clear  NECK: supple. Thyroid normal  RESPIRATORY: Chest: clear bilaterally   CARDIOVASCULAR: Heart: regular rate and rhythm  GASTROINTESTINAL: Abdomen: soft, bowel sounds active  HEMATOLOGIC: no pathological lymph nodes palpated  MUSCULOSKELETAL: Extremities: no edema, pulse 1+   INTEGUMENT: No unusual rashes or suspicious skin lesions noted. Nails appear normal.  NEUROLOGIC: non-focal exam   MENTAL STATUS: alert and oriented, appropriate affect           ASSESSMENT:  1. Essential hypertension    2. Prediabetes    3. Right thyroid nodule    4. Arthritis of knee, left    5. S/P TKR (total knee replacement), left    6. Severe obesity with body mass index (BMI) of 35.0 to 39.9 with serious comorbidity ([address]      We will give her Terazol 7 for the vaginal itching for the next seven days. If that does not stop it, then a topical steroid, for example Triamcinolone, will be tried to see if we can make her more comfortable. She is on Amlodipine 10, Hydrochlorothiazide 12.5 with excellent blood pressure control. She has prediabetes, which has been stable. She has a right thyroid nodule, which is unchanged. The arthritis in the left knee is resolved with the total knee replacement. She is not on NSAIDs at this time. We remain concerned about her weight, but fortunately she has lost 2 pounds since we last saw her. Certainly she can do better than that and we expect greater weight loss since summer is coming on. We encouraged her to get the COVID vaccine. She is a little resistant to it, but prayerfully she will accept it. It is important for her since she is a CNA. Her calcium was up slightly. We will check her renal panel today, as well as gammopathy profile, to be sure no other pathology is present.       She will be back to see me in six months, sooner if she has any problems. She can use [employer] or call us for communications of issues she may have. I have discussed the diagnosis with the patient and the intended plan as seen in the  Orders. The patient understands and agees with the plan. The patient has   received an after visit summary and questions were answered concerning  future plans  Patient labs and/or xrays were reviewed  Past records were reviewed. PLAN:  .  Orders Placed This Encounter    RENAL FUNCTION PANEL    GAMMOPATHY EVAL, SPEP/DONITA, IG QT/FLC    PTH INTACT    terconazole (TERAZOL 7) 0.4 % vaginal cream       Follow-up and Dispositions    · Return in about 6 months (around 8/24/2021). ATTENTION:   This medical record was transcribed using an electronic medical records system. Although proofread, it may and can contain electronic and spelling errors. Other human spelling and other errors may be present. Corrections may be executed at a later time. Please feel free to contact us for any clarifications as needed.

## 2021-02-25 PROBLEM — E21.0 HYPERPARATHYROIDISM, PRIMARY (HCC): Status: ACTIVE | Noted: 2021-02-24

## 2021-03-01 LAB
ALBUMIN SERPL ELPH-MCNC: 3.6 G/DL (ref 2.9–4.4)
ALBUMIN/GLOB SERPL: 1.1 {RATIO} (ref 0.7–1.7)
ALPHA1 GLOB SERPL ELPH-MCNC: 0.2 G/DL (ref 0–0.4)
ALPHA2 GLOB SERPL ELPH-MCNC: 0.8 G/DL (ref 0.4–1)
B-GLOBULIN SERPL ELPH-MCNC: 1.1 G/DL (ref 0.7–1.3)
GAMMA GLOB SERPL ELPH-MCNC: 1.3 G/DL (ref 0.4–1.8)
GLOBULIN SER-MCNC: 3.4 G/DL (ref 2.2–3.9)
IGA SERPL-MCNC: 328 MG/DL (ref 87–352)
IGG SERPL-MCNC: 1296 MG/DL (ref 586–1602)
IGM SERPL-MCNC: 206 MG/DL (ref 26–217)
INTERPRETATION SERPL IEP-IMP: ABNORMAL
KAPPA LC FREE SER-MCNC: 23.7 MG/L (ref 3.3–19.4)
KAPPA LC FREE/LAMBDA FREE SER: 1.12 {RATIO} (ref 0.26–1.65)
LAMBDA LC FREE SERPL-MCNC: 21.1 MG/L (ref 5.7–26.3)
M PROTEIN SERPL ELPH-MCNC: ABNORMAL G/DL
PROT SERPL-MCNC: 7 G/DL (ref 6–8.5)

## 2021-07-26 ENCOUNTER — OFFICE VISIT (OUTPATIENT)
Dept: INTERNAL MEDICINE CLINIC | Age: 61
End: 2021-07-26
Payer: COMMERCIAL

## 2021-07-26 VITALS
HEART RATE: 79 BPM | BODY MASS INDEX: 37.37 KG/M2 | WEIGHT: 238.1 LBS | DIASTOLIC BLOOD PRESSURE: 85 MMHG | OXYGEN SATURATION: 97 % | TEMPERATURE: 98.3 F | RESPIRATION RATE: 18 BRPM | SYSTOLIC BLOOD PRESSURE: 124 MMHG | HEIGHT: 67 IN

## 2021-07-26 DIAGNOSIS — N76.0 VULVOVAGINITIS: ICD-10-CM

## 2021-07-26 DIAGNOSIS — R73.03 PREDIABETES: ICD-10-CM

## 2021-07-26 DIAGNOSIS — N89.8 VAGINAL PRURITUS: Primary | ICD-10-CM

## 2021-07-26 LAB — GLUCOSE POC: 101 MG/DL

## 2021-07-26 PROCEDURE — 82962 GLUCOSE BLOOD TEST: CPT | Performed by: FAMILY MEDICINE

## 2021-07-26 PROCEDURE — 99213 OFFICE O/P EST LOW 20 MIN: CPT | Performed by: FAMILY MEDICINE

## 2021-07-26 RX ORDER — NYSTATIN AND TRIAMCINOLONE ACETONIDE 100000; 1 [USP'U]/G; MG/G
OINTMENT TOPICAL 2 TIMES DAILY
Qty: 30 G | Refills: 0 | Status: SHIPPED | OUTPATIENT
Start: 2021-07-26 | End: 2021-09-28 | Stop reason: SDUPTHER

## 2021-07-26 RX ORDER — FLUCONAZOLE 150 MG/1
150 TABLET ORAL DAILY
Qty: 1 TABLET | Refills: 0 | Status: SHIPPED | OUTPATIENT
Start: 2021-07-26 | End: 2021-07-27

## 2021-07-26 NOTE — PROGRESS NOTES
SPORTS MEDICINE AND PRIMARY CARE  Faizan Martin. MD [place]  [address]  [id number]    Chief Complaint   Patient presents with    Vaginal Itching     started a few months ago, comes and goes       SUBJECTIVE:    Mendez Harrell is a 62 y.o. female for vaginal itching x1 month. Patient reports possible vaginal discharge but no odor and no dysuria urinary frequency or urinary urgency. Patient denies pelvic pain. Patient is currently menopausal.  Patient's physician treated her for vaginal discharge in February. Patient reports discharge always returns. Patient is prediabetic. Last A1c was 6.2%      Current Outpatient Medications   Medication Sig Dispense Refill    nystatin-triamcinolone (MYCOLOG) 100,000-0.1 unit/gram-% ointment Apply  to affected area two (2) times a day. 30 g 0    hydroCHLOROthiazide (HYDRODIURIL) 12.5 mg tablet TAKE 1 TAB BY MOUTH DAILY. 90 Tab 3    amLODIPine (NORVASC) 10 mg tablet Take 0.5 Tabs by mouth daily. 90 Tab 3    terconazole (TERAZOL 7) 0.4 % vaginal cream Insert 1 Applicator into vagina nightly.  (Patient not taking: Reported on 7/26/2021) 45 g 0     Past Medical History:   Diagnosis Date    Arthritis     Encounter for Hemoccult screening 01/24/2017    neg    Hot flash, menopausal 2015    Hyperparathyroidism, primary ([address] 02/24/2021    Hypertension 2000    Menopause     Obesity 2000    Pelvic mass in female     Prediabetes 12/29/17    Right thyroid nodule 01/17/2018    Atypia of undetermined significance - pt states dr. Sarai Sinclair said no further eval    [place]    S/P TKR (total knee replacement), left 10/07/2019    Cate Euceda MD     Past Surgical History:   Procedure Laterality Date    HX KNEE ARTHROSCOPY Right 2013    HX KNEE ARTHROSCOPY Left 2015    HX KNEE REPLACEMENT Left     HX TUBAL LIGATION       No Known Allergies    REVIEW OF SYSTEMS:  General: negative for - chills or fever  ENT: negative for - headaches, nasal congestion, tinnitus, hearing loss, vision changes, sore throat  Respiratory: negative for - cough, hemoptysis, shortness of breath or wheezing  Cardiovascular : negative for - chest pain, edema, palpitations or shortness of breath  Gastrointestinal: negative for - abdominal pain, blood in stools, heartburn or nausea/vomiting, diarrhea, constipation  Genito-Urinary: +vaginal discharge, +vaginal pruritis; no dysuria, trouble voiding, hematuria , pevic pain  Musculoskeletal: negative for - gait disturbance, joint pain, joint stiffness , joint swelling, muscle aches  Neurological: no TIA or stroke symptoms  Hematologic: no bruises, no bleeding, no swollen glands  Integument: no lumps, mole changes, nail changes or rash  Endocrine:no malaise/lethargy or unexpected weight changes      Social History     Socioeconomic History    Marital status:      Spouse name: Not on file    Number of children: Not on file    Years of education: Not on file    Highest education level: Not on file   Tobacco Use    Smoking status: Never Smoker    Smokeless tobacco: Never Used   Vaping Use    Vaping Use: Never used   Substance and Sexual Activity    Alcohol use: Yes    Drug use: Never    Sexual activity: Yes     Partners: Male   Social History Narrative    Habits: There is no history of smoking, alcohol abuse or drug abuse.           Social History:  The patient is  and lives with her . The patient is the mother of 29 25and 39year-old children and three grandchildren. 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          Family History:  Father  at 79 of a sickness. Mother  of complications of alcoholism. She has eight siblings.      Social Determinants of Health     Financial Resource Strain:     Difficulty of Paying Living Expenses:    Food Insecurity:     Worried About Running Out of Food in the Last Year:     Ran Out of Food in the Last Year:    Transportation Needs:     Lack of Transportation (Medical):  Lack of Transportation (Non-Medical):    Physical Activity:     Days of Exercise per Week:     Minutes of Exercise per Session:    Stress:     Feeling of Stress :    Social Connections:     Frequency of Communication with Friends and Family:     Frequency of Social Gatherings with Friends and Family:     Attends Alevism Services:     Active Member of Clubs or Organizations:     Attends Club or Organization Meetings:     Marital Status:      Family History   Problem Relation Age of Onset    Hypertension Mother     Diabetes Mother     No Known Problems Father     Diabetes Sister     Hypertension Sister     No Known Problems Brother     Breast Cancer Maternal Aunt     Hypertension Sister     Other Sister         SPINA BIFIDA    No Known Problems Brother     Anesth Problems Neg Hx        OBJECTIVE:     Visit Vitals  /85   Pulse 79   Temp 98.3 °F (36.8 °C) (Oral)   Resp 18   Ht 5' 7\" (1.702 m)   Wt 238 lb 1.6 oz (108 kg)   SpO2 97%   BMI 37.29 kg/m²     CONSTITUTIONAL:  appears in usual state of health  EYES:  eom intact  ENMT:moist mucous membranes,  NECK: supple. INTEGUMENT: warm and dry  MENTAL STATUS: alert and oriented, appropriate affect   Pelvic - vagina: white slightly curdy discharge without odor, PH 4.5 ; excoriations over external genitalia, ; nomal cervix,     ASSESSMENT:   1. Vaginal pruritus    2. Vulvovaginitis    3. Prediabetes          PLAN:  .  Orders Placed This Encounter    NUSWAB VAGINITIS PLUS    AMB POC GLUCOSE BLOOD, BY GLUCOSE MONITORING DEVICE    fluconazole (DIFLUCAN) 150 mg tablet    nystatin-triamcinolone (MYCOLOG) 100,000-0.1 unit/gram-% ointment       Follow-up and Dispositions    · Return if symptoms worsen or fail to improve. I have discussed the diagnosis with the patient and the intended plan as seen in the  orders above.   The patient understands and agrees with the plan. The patient has   received an after visit summary. Questions were answered concerning  future plans  Patient labs and/or xrays were reviewed as available. Past records were reviewed as available. Counseled regarding healthy lifestyle          Advised patient to call back or return to office if symptoms develop/worsen/change/persist.  Discussed expected course/resolution/complications of diagnosis in detail with patient. Medication risks/benefits/costs/interactions/alternatives discussed with patient    Cydney De La Torre M.D. This note was created using voice recognition software.   Edits have been made but syntax errors might exist.

## 2021-07-26 NOTE — PROGRESS NOTES
1. Have you been to the ER, urgent care clinic since your last visit? Hospitalized since your last visit? No    2. Have you seen or consulted any other health care providers outside of the [address] [place] [address] since your last visit? Include any pap smears or colon screening.  No   Chief Complaint   Patient presents with    Vaginal Itching     started a few months ago, comes and goes     Visit Vitals  /85   Pulse 79   Temp 98.3 °F (36.8 °C) (Oral)   Resp 18   Ht 5' 7\" (1.702 m)   Wt 238 lb 1.6 oz (108 kg)   SpO2 97%   BMI 37.29 kg/m²

## 2021-07-26 NOTE — PATIENT INSTRUCTIONS
Fluconazole (By mouth)   Fluconazole (flht-FTY-y-zole)  Prevents and treats fungal infections. Brand Name(s): Diflucan   There may be other brand names for this medicine. When This Medicine Should Not Be Used: This medicine is not right for everyone. Do not use it if you had an allergic reaction to fluconazole, or if you are pregnant. How to Use This Medicine:   Liquid, Tablet  · Your doctor will tell you how much medicine to use. Do not use more than directed. · Oral liquid: Shake well just before each use. Measure the oral liquid medicine with a marked measuring spoon, oral syringe, or medicine cup. · Take all of the medicine in your prescription to clear up your infection, even if you feel better after the first few doses. · Read and follow the patient instructions that come with this medicine. Talk to your doctor or pharmacist if you have any questions. · Missed dose: Take a dose as soon as you remember. If it is almost time for your next dose, wait until then and take a regular dose. Do not take extra medicine to make up for a missed dose. · Store the medicine in a closed container at room temperature, away from heat, moisture, and direct light. Store the oral liquid in the refrigerator or at room temperature and use it within 14 days. Do not freeze. Drugs and Foods to Avoid:   Ask your doctor or pharmacist before using any other medicine, including over-the-counter medicines, vitamins, and herbal products. · Do not use this medicine together with astemizole, cisapride, erythromycin, pimozide, quinidine, or terfenadine. · Some foods and medicines can affect how fluconazole works. Tell your doctor if you are using cimetidine, midazolam, prednisone, rifabutin, rifampin, theophylline, tofacitinib, triazolam, vitamin A supplements, or voriconazole.  Also tell your doctor if you are using any of the following:   ¨ A blood thinner (such as warfarin)  ¨ A diuretic or \"water pill\" (such as hydrochlorothiazide), or blood pressure medicine (such as amlodipine, felodipine, isradipine, losartan, nifedipine)  ¨ Birth control pills  ¨ Cancer medicine (cyclophosphamide, vinblastine, vincristine)  ¨ Diabetes medicine that you take by mouth (glipizide, glyburide, tolbutamide)  ¨ Medicine to lower cholesterol (atorvastatin, fluvastatin, simvastatin)  ¨ Medicine to treat depression (amitriptyline, nortriptyline)  ¨ Medicine to treat HIV/AIDS (saquinavir, zidovudine)  ¨ Medicine to treat malaria (halofantrine)  ¨ Medicine to treat seizures (carbamazepine, phenytoin)  ¨ Medicine that weakens the immune system (cyclosporine, sirolimus, tacrolimus)  ¨ Narcotic pain medicine (alfentanil, fentanyl, methadone)  ¨ Pain or arthritis medicine (aspirin, celecoxib, diclofenac, ibuprofen, naproxen)  Warnings While Using This Medicine:   · It is not safe to take this medicine during pregnancy. It could harm an unborn baby. Tell your doctor right away if you become pregnant. · Tell your doctor if you are breastfeeding, or if you have kidney disease, liver disease, heart disease, heart rhythm problems, cancer, or HIV/AIDS. · This medicine may cause the following problems:   ¨ Liver problems  ¨ Serious skin reactions  ¨ Changes in heart rhythm, such as a condition called QT prolongation  · This medicine may make you dizzy or drowsy. Do not drive or do anything that could be dangerous until you know how this medicine affects you. · Call your doctor if your symptoms do not improve or if they get worse. · Keep all medicine out of the reach of children. Never share your medicine with anyone.   Possible Side Effects While Using This Medicine:   Call your doctor right away if you notice any of these side effects:  · Allergic reaction: Itching or hives, swelling in your face or hands, swelling or tingling in your mouth or throat, chest tightness, trouble breathing  · Blistering, peeling, or red skin rash  · Dark urine or pale stools, nausea, vomiting, loss of appetite, stomach pain, yellow skin or eyes  · Fast, pounding, or uneven heartbeat  · Unusual bleeding, bruising, or weakness  If you notice these less serious side effects, talk with your doctor:   · Headache  · Mild nausea, vomiting, stomach pain, or diarrhea  If you notice other side effects that you think are caused by this medicine, tell your doctor. Call your doctor for medical advice about side effects. You may report side effects to FDA at 6-706-VWV-6822  © 2017 [employer] [facility] Information is for End User's use only and may not be sold, redistributed or otherwise used for commercial purposes. The above information is an  only. It is not intended as medical advice for individual conditions or treatments. Talk to your doctor, nurse or pharmacist before following any medical regimen to see if it is safe and effective for you. Nystatin/Triamcinolone - (On the skin)   Why this medicine is used:   Treats infections caused by fungus. Common side effects:  · Blistering, burning, itching, or peeling skin  · Reddish purple lines on skin, acne  · Easy bruising  © 2017 [employer] [facility] Information is for End User's use only and may not be sold, redistributed or otherwise used for commercial purposes.

## 2021-07-30 LAB
A VAGINAE DNA VAG QL NAA+PROBE: NORMAL SCORE
BVAB2 DNA VAG QL NAA+PROBE: NORMAL SCORE
C ALBICANS DNA VAG QL NAA+PROBE: NEGATIVE
C GLABRATA DNA VAG QL NAA+PROBE: NEGATIVE
C TRACH RRNA SPEC QL NAA+PROBE: NEGATIVE
MEGA1 DNA VAG QL NAA+PROBE: NORMAL SCORE
N GONORRHOEA RRNA SPEC QL NAA+PROBE: NEGATIVE
SPECIMEN SOURCE: NORMAL
T VAGINALIS RRNA SPEC QL NAA+PROBE: NEGATIVE

## 2021-08-03 ENCOUNTER — TRANSCRIBE ORDER (OUTPATIENT)
Dept: SCHEDULING | Age: 61
End: 2021-08-03

## 2021-08-03 DIAGNOSIS — Z12.31 OTHER SCREENING MAMMOGRAM: Primary | ICD-10-CM

## 2021-08-19 RX ORDER — AMLODIPINE BESYLATE 10 MG/1
TABLET ORAL
Qty: 45 TABLET | Refills: 7 | Status: SHIPPED | OUTPATIENT
Start: 2021-08-19

## 2021-08-31 ENCOUNTER — HOSPITAL ENCOUNTER (OUTPATIENT)
Dept: MAMMOGRAPHY | Age: 61
Discharge: HOME | End: 2021-08-31
Attending: FAMILY MEDICINE
Payer: COMMERCIAL

## 2021-08-31 DIAGNOSIS — Z12.31 OTHER SCREENING MAMMOGRAM: ICD-10-CM

## 2021-08-31 PROCEDURE — 77067 SCR MAMMO BI INCL CAD: CPT

## 2021-09-28 ENCOUNTER — OFFICE VISIT (OUTPATIENT)
Dept: INTERNAL MEDICINE CLINIC | Age: 61
End: 2021-09-28
Payer: COMMERCIAL

## 2021-09-28 VITALS
BODY MASS INDEX: 37.62 KG/M2 | HEART RATE: 78 BPM | SYSTOLIC BLOOD PRESSURE: 119 MMHG | HEIGHT: 67 IN | RESPIRATION RATE: 18 BRPM | DIASTOLIC BLOOD PRESSURE: 86 MMHG | OXYGEN SATURATION: 98 % | TEMPERATURE: 98.1 F | WEIGHT: 239.7 LBS

## 2021-09-28 DIAGNOSIS — E04.1 RIGHT THYROID NODULE: ICD-10-CM

## 2021-09-28 DIAGNOSIS — N89.8 VAGINAL PRURITUS: ICD-10-CM

## 2021-09-28 DIAGNOSIS — E66.01 SEVERE OBESITY WITH BODY MASS INDEX (BMI) OF 35.0 TO 39.9 WITH SERIOUS COMORBIDITY (HCC): ICD-10-CM

## 2021-09-28 DIAGNOSIS — M19.90 ARTHRITIS: ICD-10-CM

## 2021-09-28 DIAGNOSIS — I10 ESSENTIAL HYPERTENSION: Primary | ICD-10-CM

## 2021-09-28 DIAGNOSIS — Z12.11 SCREEN FOR COLON CANCER: ICD-10-CM

## 2021-09-28 DIAGNOSIS — R73.02 IGT (IMPAIRED GLUCOSE TOLERANCE): ICD-10-CM

## 2021-09-28 DIAGNOSIS — E21.0 HYPERPARATHYROIDISM, PRIMARY (HCC): ICD-10-CM

## 2021-09-28 DIAGNOSIS — Z23 NEEDS FLU SHOT: ICD-10-CM

## 2021-09-28 PROCEDURE — 90686 IIV4 VACC NO PRSV 0.5 ML IM: CPT | Performed by: INTERNAL MEDICINE

## 2021-09-28 PROCEDURE — 99214 OFFICE O/P EST MOD 30 MIN: CPT | Performed by: INTERNAL MEDICINE

## 2021-09-28 PROCEDURE — 90471 IMMUNIZATION ADMIN: CPT | Performed by: INTERNAL MEDICINE

## 2021-09-28 RX ORDER — NYSTATIN AND TRIAMCINOLONE ACETONIDE 100000; 1 [USP'U]/G; MG/G
OINTMENT TOPICAL 2 TIMES DAILY
Qty: 30 G | Refills: 11 | Status: SHIPPED | OUTPATIENT
Start: 2021-09-28 | End: 2022-03-29 | Stop reason: SDUPTHER

## 2021-09-28 NOTE — PROGRESS NOTES
1. Have you been to the ER, urgent care clinic since your last visit? Hospitalized since your last visit? No    2. Have you seen or consulted any other health care providers outside of the [address] [place] [address] since your last visit? Include any pap smears or colon screening.  No     Wants to discuss refill on ointment

## 2021-09-28 NOTE — PATIENT INSTRUCTIONS
Vaccine Information Statement    Influenza (Flu) Vaccine (Inactivated or Recombinant): What You Need to Know    Many vaccine information statements are available in Malay and other languages. See www.immunize.org/vis. Hojas de información sobre vacunas están disponibles en español y en muchos otros idiomas. Visite www.immunize.org/vis. 1. Why get vaccinated? Influenza vaccine can prevent influenza (flu). Flu is a contagious disease that spreads around the [place] [facility] every year, usually between October and May. Anyone can get the flu, but it is more dangerous for some people. Infants and young children, people 72 years and older, pregnant people, and people with certain health conditions or a weakened immune system are at greatest risk of flu complications. Pneumonia, bronchitis, sinus infections, and ear infections are examples of flu-related complications. If you have a medical condition, such as heart disease, cancer, or diabetes, flu can make it worse. Flu can cause fever and chills, sore throat, muscle aches, fatigue, cough, headache, and runny or stuffy nose. Some people may have vomiting and diarrhea, though this is more common in children than adults. In an average year, thousands of people in the [facility] die from flu, and many more are hospitalized. Flu vaccine prevents millions of illnesses and flu-related visits to the doctor each year. 2. Influenza vaccines     CDC recommends everyone 6 months and older get vaccinated every flu season. Children 6 months through 6years of age may need 2 doses during a single flu season. Everyone else needs only 1 dose each flu season. It takes about 2 weeks for protection to develop after vaccination. There are many flu viruses, and they are always changing. Each year a new flu vaccine is made to protect against the influenza viruses believed to be likely to cause disease in the upcoming flu season.  Even when the vaccine doesnt exactly match these viruses, it may still provide some protection. Influenza vaccine does not cause flu. Influenza vaccine may be given at the same time as other vaccines. 3. Talk with your health care provider    Tell your vaccination provider if the person getting the vaccine:   Has had an allergic reaction after a previous dose of influenza vaccine, or has any severe, life-threatening allergies    Has ever had Guillain-Barré Syndrome (also called GBS)    In some cases, your health care provider may decide to postpone influenza vaccination until a future visit. Influenza vaccine can be administered at any time during pregnancy. People who are or will be pregnant during influenza season should receive inactivated influenza vaccine. People with minor illnesses, such as a cold, may be vaccinated. People who are moderately or severely ill should usually wait until they recover before getting influenza vaccine. Your health care provider can give you more information. 4. Risks of a vaccine reaction     Soreness, redness, and swelling where the shot is given, fever, muscle aches, and headache can happen after influenza vaccination.  There may be a very small increased risk of Guillain-Barré Syndrome (GBS) after inactivated influenza vaccine (the flu shot). Rhiannon Rodriguez children who get the flu shot along with pneumococcal vaccine (PCV13) and/or DTaP vaccine at the same time might be slightly more likely to have a seizure caused by fever. Tell your health care provider if a child who is getting flu vaccine has ever had a seizure. People sometimes faint after medical procedures, including vaccination. Tell your provider if you feel dizzy or have vision changes or ringing in the ears. As with any medicine, there is a very remote chance of a vaccine causing a severe allergic reaction, other serious injury, or death. 5. What if there is a serious problem?     An allergic reaction could occur after the vaccinated person leaves the clinic. If you see signs of a severe allergic reaction (hives, swelling of the face and throat, difficulty breathing, a fast heartbeat, dizziness, or weakness), call 9-1-1 and get the person to the nearest hospital.    For other signs that concern you, call your health care provider. Adverse reactions should be reported to the Vaccine Adverse Event Reporting System (VAERS). Your health care provider will usually file this report, or you can do it yourself. Visit the VAERS website at www.vaers. [facility].gov or call 2-133.416.6209. VAERS is only for reporting reactions, and VAERS staff members do not give medical advice. 6. The National Vaccine Injury Compensation Program    The [facility] Vaccine Injury Compensation Program (VICP) is a federal program that was created to compensate people who may have been injured by certain vaccines. Claims regarding alleged injury or death due to vaccination have a time limit for filing, which may be as short as two years. Visit the VICP website at www.[facility].gov/vaccinecompensation or call 9-988.907.4863 to learn about the program and about filing a claim. 7. How can I learn more?  Ask your health care provider.  Call your local or state health department.  Visit the website of the Food and Drug Administration (FDA) for vaccine package inserts and additional information at www.fda.gov/vaccines-blood-biologics/vaccines.  Contact the Centers for Disease Control and Prevention (CDC):  - Call 1-573.722.4273 (1-800-CDC-INFO) or  - Visit CDCs influenza website at www.cdc.gov/flu. Vaccine Information Statement   Inactivated Influenza Vaccine   8/6/2021  42 JOSÉ Galindo [id number]-26   Department of Health and Human Services  Centers for Disease Control and Prevention    Office Use Only

## 2021-09-29 LAB
ALBUMIN SERPL-MCNC: 3.9 G/DL (ref 3.5–5)
ALBUMIN/GLOB SERPL: 1 {RATIO} (ref 1.1–2.2)
ALP SERPL-CCNC: 69 U/L (ref 45–117)
ALT SERPL-CCNC: 19 U/L (ref 12–78)
ANION GAP SERPL CALC-SCNC: 6 MMOL/L (ref 5–15)
APPEARANCE UR: CLEAR
AST SERPL-CCNC: 14 U/L (ref 15–37)
BASOPHILS # BLD: 0.1 K/UL (ref 0–0.1)
BASOPHILS NFR BLD: 1 % (ref 0–1)
BILIRUB SERPL-MCNC: 0.4 MG/DL (ref 0.2–1)
BILIRUB UR QL: NEGATIVE
BUN SERPL-MCNC: 17 MG/DL (ref 6–20)
BUN/CREAT SERPL: 22 (ref 12–20)
CALCIUM SERPL-MCNC: 9.9 MG/DL (ref 8.5–10.1)
CHLORIDE SERPL-SCNC: 106 MMOL/L (ref 97–108)
CHOLEST SERPL-MCNC: 170 MG/DL
CO2 SERPL-SCNC: 29 MMOL/L (ref 21–32)
COLOR UR: NORMAL
CREAT SERPL-MCNC: 0.79 MG/DL (ref 0.55–1.02)
DIFFERENTIAL METHOD BLD: ABNORMAL
EOSINOPHIL # BLD: 0.2 K/UL (ref 0–0.4)
EOSINOPHIL NFR BLD: 4 % (ref 0–7)
ERYTHROCYTE [DISTWIDTH] IN BLOOD BY AUTOMATED COUNT: 15.6 % (ref 11.5–14.5)
EST. AVERAGE GLUCOSE BLD GHB EST-MCNC: 134 MG/DL
GLOBULIN SER CALC-MCNC: 3.8 G/DL (ref 2–4)
GLUCOSE SERPL-MCNC: 81 MG/DL (ref 65–100)
GLUCOSE UR STRIP.AUTO-MCNC: NEGATIVE MG/DL
HBA1C MFR BLD: 6.3 % (ref 4–5.6)
HCT VFR BLD AUTO: 41.4 % (ref 35–47)
HDLC SERPL-MCNC: 59 MG/DL
HDLC SERPL: 2.9 {RATIO} (ref 0–5)
HGB BLD-MCNC: 13 G/DL (ref 11.5–16)
HGB UR QL STRIP: NEGATIVE
IMM GRANULOCYTES # BLD AUTO: 0 K/UL (ref 0–0.04)
IMM GRANULOCYTES NFR BLD AUTO: 0 % (ref 0–0.5)
KETONES UR QL STRIP.AUTO: NEGATIVE MG/DL
LDLC SERPL CALC-MCNC: 84.2 MG/DL (ref 0–100)
LEUKOCYTE ESTERASE UR QL STRIP.AUTO: NEGATIVE
LYMPHOCYTES # BLD: 2.2 K/UL (ref 0.8–3.5)
LYMPHOCYTES NFR BLD: 39 % (ref 12–49)
MCH RBC QN AUTO: 25.6 PG (ref 26–34)
MCHC RBC AUTO-ENTMCNC: 31.4 G/DL (ref 30–36.5)
MCV RBC AUTO: 81.7 FL (ref 80–99)
MONOCYTES # BLD: 0.6 K/UL (ref 0–1)
MONOCYTES NFR BLD: 10 % (ref 5–13)
NEUTS SEG # BLD: 2.6 K/UL (ref 1.8–8)
NEUTS SEG NFR BLD: 46 % (ref 32–75)
NITRITE UR QL STRIP.AUTO: NEGATIVE
NRBC # BLD: 0 K/UL (ref 0–0.01)
NRBC BLD-RTO: 0 PER 100 WBC
PH UR STRIP: 5 [PH] (ref 5–8)
PLATELET # BLD AUTO: 315 K/UL (ref 150–400)
PMV BLD AUTO: 10.5 FL (ref 8.9–12.9)
POTASSIUM SERPL-SCNC: 3.9 MMOL/L (ref 3.5–5.1)
PROT SERPL-MCNC: 7.7 G/DL (ref 6.4–8.2)
PROT UR STRIP-MCNC: NEGATIVE MG/DL
RBC # BLD AUTO: 5.07 M/UL (ref 3.8–5.2)
SODIUM SERPL-SCNC: 141 MMOL/L (ref 136–145)
SP GR UR REFRACTOMETRY: 1.01 (ref 1–1.03)
TRIGL SERPL-MCNC: 134 MG/DL (ref ?–150)
TSH SERPL DL<=0.05 MIU/L-ACNC: 2.64 UIU/ML (ref 0.36–3.74)
UROBILINOGEN UR QL STRIP.AUTO: 0.2 EU/DL (ref 0.2–1)
VLDLC SERPL CALC-MCNC: 26.8 MG/DL
WBC # BLD AUTO: 5.7 K/UL (ref 3.6–11)

## 2021-11-12 ENCOUNTER — HOSPITAL ENCOUNTER (OUTPATIENT)
Dept: PREADMISSION TESTING | Age: 61
Discharge: HOME | End: 2021-11-12
Payer: COMMERCIAL

## 2021-11-12 PROCEDURE — U0005 INFEC AGEN DETEC AMPLI PROBE: HCPCS

## 2021-11-13 ENCOUNTER — ANESTHESIA EVENT (OUTPATIENT)
Dept: SURGERY | Age: 61
End: 2021-11-13
Payer: COMMERCIAL

## 2021-11-14 LAB
SARS-COV-2, XPLCVT: NOT DETECTED
SOURCE, COVRS: NORMAL

## 2021-11-15 NOTE — H&P
G I Procedure Note           Endoscopy History and Physical           Dr. June Bauman [address] [place] [address] [id number]  [id number]    1960  62 y.o.  female      Date of Procedure:   Preoperative Diagnosis:       Procedure:   11/16/2021      SCREENING                          Procedure(s):  COLONOSCOPY      Gastroenterologist:  Anesthesia:           Nell Michaels MD                               MAC            History and procedure indication:  Mook Palmer is a 62 y.o. BLACK/ female who presents with: SCREENING   including the additional history of Screening ,Screening for colon cancer,,        Past Medical History:   Diagnosis Date    Arthritis     Encounter for Hemoccult screening 01/24/2017    neg    Hot flash, menopausal 2015    Hyperparathyroidism, primary ([address] 02/24/2021    Hypertension 2000    Menopause     Obesity 2000    Pelvic mass in female     Prediabetes 12/29/17    Right thyroid nodule 01/17/2018    Atypia of undetermined significance - pt states dr. Cristobal Bates said no further eval    [place]    S/P TKR (total knee replacement), left 10/07/2019    Anjel Moreno MD      Prior to Admission medications    Medication Sig Start Date End Date Taking? Authorizing Provider   nystatin-triamcinolone (MYCOLOG) 100,000-0.1 unit/gram-% ointment Apply  to affected area two (2) times a day. 9/28/21   Lincoln Duran MD   amLODIPine (NORVASC) 10 mg tablet TAKE 1/2 TABLET BY MOUTH EVERY DAY 8/19/21   Leslie Lai MD   terconazole (TERAZOL 7) 0.4 % vaginal cream Insert 1 Applicator into vagina nightly. 2/24/21   Evan Tapia MD   hydroCHLOROthiazide (HYDRODIURIL) 12.5 mg tablet TAKE 1 TAB BY MOUTH DAILY.  12/18/20   Evan Tapia MD     No Known Allergies    Past Surgical History: Procedure Laterality Date    HX KNEE ARTHROSCOPY Right 2013    HX KNEE ARTHROSCOPY Left 2015    HX KNEE REPLACEMENT Left     HX TUBAL LIGATION       Family History   Problem Relation Age of Onset    Hypertension Mother     Diabetes Mother     No Known Problems Father     Diabetes Sister     Hypertension Sister     No Known Problems Brother     Breast Cancer Maternal Aunt     Hypertension Sister     Other Sister         SPINA BIFIDA    No Known Problems Brother     Anesth Problems Neg Hx       Social History     Tobacco Use    Smoking status: Never Smoker    Smokeless tobacco: Never Used   Substance Use Topics    Alcohol use: Yes                                                      PHYSICAL EXAM   There were no vitals taken for this visit. General appearance:  alert,  in no distress  Mental status:  normal mood, behavior, speech, dress, motor activity and thought processes  Nose:      normal and patent, no erythema, discharge    Mouth:- mucous membranes moist, pharynx normal without lesions                  [x]  No Loose teeth      []    Loose teeth  Finger opening:  []1     []1.5    [] 2     [] 2.5     [x] 3      [] 3.5     [] 4   Mallampati:         [] Class 1     [x] Class 2    [] Class 3      [] Class 4      Neck - supple,      [x] Full ROM [] Decreased ROM  [] Short Neck no significant adenopathy    Chest - clear to auscultation, no wheezes, rales or rhonchi, symmetric air entry  Heart: normal rate, regular rhythm, normal S1, S2, no murmurs,   Abdomen: abdomen soft, bowel sounds  [x] normal  [] increased  [] hypoactive                  [] no tenderness  [] epigastric tenderness  [] LLQ tenderness   [] RLQ tenderness                      No masses, organomegaly or guarding.   Rectal exam: negative without mass, lesions or tenderness  Extremities:  , no pedal edema, no  cyanosis  Neurologic: Alert and oriented to person, place, and time;                          Normal symmetric reflexes Normal gait:                                      Assessement:                                 Pre op dx:  SCREENING   Additional medical problems list below   Patient Active Problem List   Diagnosis Code    Hypertension I10    Hot flash, menopausal N95.1    S/P colonoscopy Z98.890    Encounter for Hemoccult screening Z12.11    IGT (impaired glucose tolerance) R73.02    Right thyroid nodule E04.1    Severe obesity with body mass index (BMI) of 35.0 to 39.9 with serious comorbidity (HCC) E66.01    Pelvic mass in female R19.00    Arthritis M19.90    Arthritis of knee, left M17.12    S/P TKR (total knee replacement), left Z96.652    Hyperparathyroidism, primary ([facility] [address] E21.0                                                                                           This note documentation was performed prior to this planned procedure       after a history and physical was performed in the office.          Date: 11 9 21   Office exam   11/16/2021 Immediate update no changes in H&P                        Pre Procedure Evaluation (per anesthesia or per h&p)                                                Sedation/Assessment:                                                                                               Mallampati Classification                            []Class 1                    []Class 2                    [] Class 3                  [] Class 4                                              ASA classfication         []     Class I: Normally healthy         []     Class II: Patient with mild systemic disease (e.g. hypertension)         []     Class III: Patient with severe systemic disease (e.g. CHF), non-decompensated         []     Class IV: Patient with severe systemic disease, decompensated         []     Class V: Moribund patient, survival unlikely                     Plan:   []    Egd                                [x]  Colonoscopy                                [] with Moderate Sedation /Conscious Sedation                                  [x] MAC          Patient stable for planned procedure. See orders.      Shelton Whitten MD

## 2021-11-16 ENCOUNTER — HOSPITAL ENCOUNTER (OUTPATIENT)
Age: 61
Setting detail: OUTPATIENT SURGERY
Discharge: HOME | End: 2021-11-16
Attending: INTERNAL MEDICINE | Admitting: INTERNAL MEDICINE
Payer: COMMERCIAL

## 2021-11-16 ENCOUNTER — ANESTHESIA (OUTPATIENT)
Dept: SURGERY | Age: 61
End: 2021-11-16
Payer: COMMERCIAL

## 2021-11-16 VITALS
HEIGHT: 67 IN | WEIGHT: 230 LBS | RESPIRATION RATE: 18 BRPM | TEMPERATURE: 98 F | DIASTOLIC BLOOD PRESSURE: 88 MMHG | OXYGEN SATURATION: 100 % | SYSTOLIC BLOOD PRESSURE: 130 MMHG | BODY MASS INDEX: 36.1 KG/M2 | HEART RATE: 75 BPM

## 2021-11-16 PROCEDURE — 77030013992 HC SNR POLYP ENDOSC BSC -B: Performed by: INTERNAL MEDICINE

## 2021-11-16 PROCEDURE — 77030007289 HC DEV ROTH NET RETRV STRC -B: Performed by: INTERNAL MEDICINE

## 2021-11-16 PROCEDURE — 2709999900 HC NON-CHARGEABLE SUPPLY: Performed by: INTERNAL MEDICINE

## 2021-11-16 PROCEDURE — 76210000020 HC REC RM PH II FIRST 0.5 HR: Performed by: INTERNAL MEDICINE

## 2021-11-16 PROCEDURE — 77030021593 HC FCPS BIOP ENDOSC BSC -A: Performed by: INTERNAL MEDICINE

## 2021-11-16 PROCEDURE — 74011250636 HC RX REV CODE- 250/636: Performed by: ANESTHESIOLOGY

## 2021-11-16 PROCEDURE — 76010000138 HC OR TIME 0.5 TO 1 HR: Performed by: INTERNAL MEDICINE

## 2021-11-16 PROCEDURE — 88305 TISSUE EXAM BY PATHOLOGIST: CPT

## 2021-11-16 PROCEDURE — 76060000032 HC ANESTHESIA 0.5 TO 1 HR: Performed by: INTERNAL MEDICINE

## 2021-11-16 RX ORDER — FLUMAZENIL 0.1 MG/ML
0.2 INJECTION INTRAVENOUS
Status: DISCONTINUED | OUTPATIENT
Start: 2021-11-16 | End: 2021-11-16 | Stop reason: HOSPADM

## 2021-11-16 RX ORDER — SODIUM CHLORIDE 9 MG/ML
50 INJECTION, SOLUTION INTRAVENOUS CONTINUOUS
Status: DISCONTINUED | OUTPATIENT
Start: 2021-11-16 | End: 2021-11-16 | Stop reason: HOSPADM

## 2021-11-16 RX ORDER — SODIUM CHLORIDE 0.9 % (FLUSH) 0.9 %
5-40 SYRINGE (ML) INJECTION AS NEEDED
Status: DISCONTINUED | OUTPATIENT
Start: 2021-11-16 | End: 2021-11-16 | Stop reason: HOSPADM

## 2021-11-16 RX ORDER — EPINEPHRINE 0.1 MG/ML
1 INJECTION INTRACARDIAC; INTRAVENOUS
Status: DISCONTINUED | OUTPATIENT
Start: 2021-11-16 | End: 2021-11-16 | Stop reason: SDUPTHER

## 2021-11-16 RX ORDER — FENTANYL CITRATE 50 UG/ML
100 INJECTION, SOLUTION INTRAMUSCULAR; INTRAVENOUS ONCE
Status: DISCONTINUED | OUTPATIENT
Start: 2021-11-16 | End: 2021-11-16 | Stop reason: HOSPADM

## 2021-11-16 RX ORDER — LIDOCAINE HYDROCHLORIDE 20 MG/ML
5 SOLUTION OROPHARYNGEAL AS NEEDED
Status: DISCONTINUED | OUTPATIENT
Start: 2021-11-16 | End: 2021-11-16 | Stop reason: SDUPTHER

## 2021-11-16 RX ORDER — NALOXONE HYDROCHLORIDE 0.4 MG/ML
0.4 INJECTION, SOLUTION INTRAMUSCULAR; INTRAVENOUS; SUBCUTANEOUS
Status: DISCONTINUED | OUTPATIENT
Start: 2021-11-16 | End: 2021-11-16 | Stop reason: SDUPTHER

## 2021-11-16 RX ORDER — SODIUM CHLORIDE 9 MG/ML
100 INJECTION, SOLUTION INTRAVENOUS CONTINUOUS
Status: DISCONTINUED | OUTPATIENT
Start: 2021-11-16 | End: 2021-11-16 | Stop reason: HOSPADM

## 2021-11-16 RX ORDER — SODIUM CHLORIDE 0.9 % (FLUSH) 0.9 %
5-40 SYRINGE (ML) INJECTION EVERY 8 HOURS
Status: DISCONTINUED | OUTPATIENT
Start: 2021-11-16 | End: 2021-11-16 | Stop reason: HOSPADM

## 2021-11-16 RX ORDER — DEXTROMETHORPHAN/PSEUDOEPHED 2.5-7.5/.8
1.2 DROPS ORAL
Status: DISCONTINUED | OUTPATIENT
Start: 2021-11-16 | End: 2021-11-16 | Stop reason: HOSPADM

## 2021-11-16 RX ORDER — EPINEPHRINE 0.1 MG/ML
1 INJECTION INTRACARDIAC; INTRAVENOUS
Status: DISCONTINUED | OUTPATIENT
Start: 2021-11-16 | End: 2021-11-16 | Stop reason: HOSPADM

## 2021-11-16 RX ORDER — LIDOCAINE HYDROCHLORIDE 10 MG/ML
0.1 INJECTION, SOLUTION EPIDURAL; INFILTRATION; INTRACAUDAL; PERINEURAL AS NEEDED
Status: DISCONTINUED | OUTPATIENT
Start: 2021-11-16 | End: 2021-11-16 | Stop reason: HOSPADM

## 2021-11-16 RX ORDER — MIDAZOLAM HYDROCHLORIDE 1 MG/ML
5 INJECTION, SOLUTION INTRAMUSCULAR; INTRAVENOUS
Status: DISCONTINUED | OUTPATIENT
Start: 2021-11-16 | End: 2021-11-16 | Stop reason: SDUPTHER

## 2021-11-16 RX ORDER — ATROPINE SULFATE 0.1 MG/ML
0.5 INJECTION INTRAVENOUS
Status: DISCONTINUED | OUTPATIENT
Start: 2021-11-16 | End: 2021-11-16 | Stop reason: SDUPTHER

## 2021-11-16 RX ORDER — PROPOFOL 10 MG/ML
INJECTION, EMULSION INTRAVENOUS AS NEEDED
Status: DISCONTINUED | OUTPATIENT
Start: 2021-11-16 | End: 2021-11-16 | Stop reason: HOSPADM

## 2021-11-16 RX ORDER — SODIUM CHLORIDE, SODIUM LACTATE, POTASSIUM CHLORIDE, CALCIUM CHLORIDE 600; 310; 30; 20 MG/100ML; MG/100ML; MG/100ML; MG/100ML
INJECTION, SOLUTION INTRAVENOUS
Status: DISCONTINUED | OUTPATIENT
Start: 2021-11-16 | End: 2021-11-16 | Stop reason: HOSPADM

## 2021-11-16 RX ORDER — NALOXONE HYDROCHLORIDE 0.4 MG/ML
0.4 INJECTION, SOLUTION INTRAMUSCULAR; INTRAVENOUS; SUBCUTANEOUS
Status: DISCONTINUED | OUTPATIENT
Start: 2021-11-16 | End: 2021-11-16 | Stop reason: HOSPADM

## 2021-11-16 RX ORDER — MIDAZOLAM HYDROCHLORIDE 1 MG/ML
5 INJECTION, SOLUTION INTRAMUSCULAR; INTRAVENOUS
Status: DISCONTINUED | OUTPATIENT
Start: 2021-11-16 | End: 2021-11-16 | Stop reason: HOSPADM

## 2021-11-16 RX ORDER — DEXTROSE MONOHYDRATE AND SODIUM CHLORIDE 5; .9 G/100ML; G/100ML
100 INJECTION, SOLUTION INTRAVENOUS CONTINUOUS
Status: DISCONTINUED | OUTPATIENT
Start: 2021-11-16 | End: 2021-11-16 | Stop reason: SDUPTHER

## 2021-11-16 RX ORDER — DEXTROMETHORPHAN/PSEUDOEPHED 2.5-7.5/.8
1.2 DROPS ORAL
Status: DISCONTINUED | OUTPATIENT
Start: 2021-11-16 | End: 2021-11-16 | Stop reason: SDUPTHER

## 2021-11-16 RX ORDER — SODIUM CHLORIDE, SODIUM LACTATE, POTASSIUM CHLORIDE, CALCIUM CHLORIDE 600; 310; 30; 20 MG/100ML; MG/100ML; MG/100ML; MG/100ML
50 INJECTION, SOLUTION INTRAVENOUS CONTINUOUS
Status: DISCONTINUED | OUTPATIENT
Start: 2021-11-16 | End: 2021-11-16 | Stop reason: HOSPADM

## 2021-11-16 RX ORDER — DEXTROSE MONOHYDRATE AND SODIUM CHLORIDE 5; .9 G/100ML; G/100ML
100 INJECTION, SOLUTION INTRAVENOUS CONTINUOUS
Status: DISCONTINUED | OUTPATIENT
Start: 2021-11-16 | End: 2021-11-16 | Stop reason: HOSPADM

## 2021-11-16 RX ORDER — FLUMAZENIL 0.1 MG/ML
0.2 INJECTION INTRAVENOUS
Status: DISCONTINUED | OUTPATIENT
Start: 2021-11-16 | End: 2021-11-16 | Stop reason: SDUPTHER

## 2021-11-16 RX ORDER — LIDOCAINE HYDROCHLORIDE 20 MG/ML
5 SOLUTION OROPHARYNGEAL AS NEEDED
Status: DISCONTINUED | OUTPATIENT
Start: 2021-11-16 | End: 2021-11-16 | Stop reason: HOSPADM

## 2021-11-16 RX ORDER — ATROPINE SULFATE 1 MG/ML
0.5 INJECTION, SOLUTION INTRAVENOUS
Status: DISCONTINUED | OUTPATIENT
Start: 2021-11-16 | End: 2021-11-16 | Stop reason: HOSPADM

## 2021-11-16 RX ORDER — FENTANYL CITRATE 50 UG/ML
100 INJECTION, SOLUTION INTRAMUSCULAR; INTRAVENOUS ONCE
Status: DISCONTINUED | OUTPATIENT
Start: 2021-11-16 | End: 2021-11-16 | Stop reason: SDUPTHER

## 2021-11-16 RX ADMIN — PROPOFOL 20 MG: 10 INJECTION, EMULSION INTRAVENOUS at 11:21

## 2021-11-16 RX ADMIN — PROPOFOL 20 MG: 10 INJECTION, EMULSION INTRAVENOUS at 11:24

## 2021-11-16 RX ADMIN — PROPOFOL 10 MG: 10 INJECTION, EMULSION INTRAVENOUS at 11:28

## 2021-11-16 RX ADMIN — PROPOFOL 10 MG: 10 INJECTION, EMULSION INTRAVENOUS at 11:27

## 2021-11-16 RX ADMIN — PROPOFOL 10 MG: 10 INJECTION, EMULSION INTRAVENOUS at 11:17

## 2021-11-16 RX ADMIN — PROPOFOL 20 MG: 10 INJECTION, EMULSION INTRAVENOUS at 11:23

## 2021-11-16 RX ADMIN — PROPOFOL 10 MG: 10 INJECTION, EMULSION INTRAVENOUS at 11:38

## 2021-11-16 RX ADMIN — PROPOFOL 10 MG: 10 INJECTION, EMULSION INTRAVENOUS at 11:41

## 2021-11-16 RX ADMIN — PROPOFOL 10 MG: 10 INJECTION, EMULSION INTRAVENOUS at 11:42

## 2021-11-16 RX ADMIN — PROPOFOL 10 MG: 10 INJECTION, EMULSION INTRAVENOUS at 11:15

## 2021-11-16 RX ADMIN — PROPOFOL 10 MG: 10 INJECTION, EMULSION INTRAVENOUS at 11:32

## 2021-11-16 RX ADMIN — PROPOFOL 10 MG: 10 INJECTION, EMULSION INTRAVENOUS at 11:35

## 2021-11-16 RX ADMIN — PROPOFOL 10 MG: 10 INJECTION, EMULSION INTRAVENOUS at 11:29

## 2021-11-16 RX ADMIN — PROPOFOL 10 MG: 10 INJECTION, EMULSION INTRAVENOUS at 11:39

## 2021-11-16 RX ADMIN — PROPOFOL 10 MG: 10 INJECTION, EMULSION INTRAVENOUS at 11:40

## 2021-11-16 RX ADMIN — PROPOFOL 10 MG: 10 INJECTION, EMULSION INTRAVENOUS at 11:19

## 2021-11-16 RX ADMIN — PROPOFOL 20 MG: 10 INJECTION, EMULSION INTRAVENOUS at 11:22

## 2021-11-16 RX ADMIN — PROPOFOL 10 MG: 10 INJECTION, EMULSION INTRAVENOUS at 11:31

## 2021-11-16 RX ADMIN — PROPOFOL 10 MG: 10 INJECTION, EMULSION INTRAVENOUS at 11:33

## 2021-11-16 RX ADMIN — PROPOFOL 10 MG: 10 INJECTION, EMULSION INTRAVENOUS at 11:34

## 2021-11-16 RX ADMIN — PROPOFOL 100 MG: 10 INJECTION, EMULSION INTRAVENOUS at 11:14

## 2021-11-16 RX ADMIN — PROPOFOL 10 MG: 10 INJECTION, EMULSION INTRAVENOUS at 11:25

## 2021-11-16 RX ADMIN — PROPOFOL 10 MG: 10 INJECTION, EMULSION INTRAVENOUS at 11:26

## 2021-11-16 RX ADMIN — PROPOFOL 10 MG: 10 INJECTION, EMULSION INTRAVENOUS at 11:20

## 2021-11-16 RX ADMIN — PROPOFOL 10 MG: 10 INJECTION, EMULSION INTRAVENOUS at 11:36

## 2021-11-16 RX ADMIN — SODIUM CHLORIDE, POTASSIUM CHLORIDE, SODIUM LACTATE AND CALCIUM CHLORIDE: 600; 310; 30; 20 INJECTION, SOLUTION INTRAVENOUS at 09:29

## 2021-11-16 RX ADMIN — PROPOFOL 10 MG: 10 INJECTION, EMULSION INTRAVENOUS at 11:37

## 2021-11-16 RX ADMIN — PROPOFOL 10 MG: 10 INJECTION, EMULSION INTRAVENOUS at 11:18

## 2021-11-16 RX ADMIN — PROPOFOL 10 MG: 10 INJECTION, EMULSION INTRAVENOUS at 11:30

## 2021-11-16 NOTE — H&P
History and physical has been reviewed. The patient has been interviewned and examined.  There have been no significant clinical changes since the completion of the originally dated History and Physical.

## 2021-11-16 NOTE — PERIOP NOTES
Handoff Report from Operating Room to PACU    Report received from Dr Bibi Santiago and [address] regarding Cullen Gary. Surgeon(s):  Palomo Clarke MD  And Procedure(s) (LRB):  COLONOSCOPY (N/A)  ENDOSCOPIC POLYPECTOMY (N/A)  COLON BIOPSY (N/A)  confirmed   with allergies discussed. Anesthesia type, drugs, patient history, complications, estimated blood loss, vital signs, intake and output, and lines and temperature were reviewed.

## 2021-11-16 NOTE — ANESTHESIA POSTPROCEDURE EVALUATION
Procedure(s):  COLONOSCOPY  ENDOSCOPIC POLYPECTOMY  COLON BIOPSY. MAC    Anesthesia Post Evaluation      Multimodal analgesia: multimodal analgesia not used between 6 hours prior to anesthesia start to PACU discharge  Patient location during evaluation: PACU  Patient participation: complete - patient participated  Level of consciousness: awake and alert  Pain management: adequate  Airway patency: patent  Anesthetic complications: no  Cardiovascular status: acceptable  Respiratory status: acceptable  Hydration status: acceptable  Post anesthesia nausea and vomiting:  none  Final Post Anesthesia Temperature Assessment:  Normothermia (36.0-37.5 degrees C)      INITIAL Post-op Vital signs:   Vitals Value Taken Time   /88 11/16/21 1210   Temp 36.7 °C (98 °F) 11/16/21 1210   Pulse 71 11/16/21 1212   Resp 29 11/16/21 1212   SpO2 100 % 11/16/21 1212   Vitals shown include unvalidated device data.

## 2021-11-16 NOTE — PERIOP NOTES
Patient meets discharge criteria. Patient and Spouse provided with verbal and written discharge instructions. Patient discharged by wheelchair with Spouse to transport home.

## 2021-11-16 NOTE — ANESTHESIA PREPROCEDURE EVALUATION
Relevant Problems   No relevant active problems       Anesthetic History   No history of anesthetic complications            Review of Systems / Medical History  Patient summary reviewed and pertinent labs reviewed    Pulmonary  Within defined limits                 Neuro/Psych   Within defined limits           Cardiovascular    Hypertension              Exercise tolerance: >4 METS     GI/Hepatic/Renal  Within defined limits              Endo/Other        Morbid obesity and arthritis     Other Findings              Physical Exam    Airway  Mallampati: I  TM Distance: 4 - 6 cm  Neck ROM: normal range of motion   Mouth opening: Normal     Cardiovascular    Rhythm: regular  Rate: normal         Dental    Dentition: Lower dentition intact and Upper dentition intact     Pulmonary  Breath sounds clear to auscultation               Abdominal  GI exam deferred       Other Findings            Anesthetic Plan    ASA: 2  Anesthesia type: MAC            Anesthetic plan and risks discussed with: Patient

## 2021-11-16 NOTE — PROCEDURES
G I Procedure Note            COLONOSCOPY   Dr. Libby Uriasmaritza office   [address]                                   [phone]                                  [id number]   1960                                      62 y.o.                                    female      Procedure Date: 11/16/2021   [x]  Anesthesia MAC                                                                                                Pre Op Diagnosis:    Indications:                   1. SCREENING                                                                                                                                                                          Post Op Diagnosis:                    1.   DIVERTICULOSIS, multiple removed x3 COLON POLYP                                                                            H&p completed: Yes            Anesthesia Assessment: Performed prior to procedure:      No change  Anesthesia Plan: Performed prior to procedure:                   No change       Medications: See Reviewed List and Reconcilation           Informed consent was obtained     Risk Statement:  Prior to the procedure the risks were explained to the patient and/or to the family including but not limited to perforation, bleeding, adverse drug reaction, aspiration, and even the need for possible surgery. A colonoscopy exam is not 100% accurate which may be related to preparation or blind spots during the exam.The possibility that an abnormality and /or cancer could be missed was also discussed as well as alternative x-ray options.          Instrument:    Olympus adult Videocolonoscope                                   Immediate Procedure Reassessment Completed     With the patient in the left lateral position, a rectal examination was performed and the findings were: negative without mass, lesions or tenderness   The Olympus Video colonoscope was inserted under direct vision into the rectum. The colonoscope was passed from the rectum to the cecum, which was identified by the ileocecal valve. The colon findings demonstrated:  ANUS: Anal exam reveals no masses or external hemorrhoids, sphincter tone is normal.   RECTUM: Rectal exam reveals no masses  . SIGMOID COLON: The sigmoid was unremarkable except as noted below   Findings below   DESCENDING COLON:  The videoscolonoscope was advanced carefully. Findings below  SPLENIC FLEXURE: The splenic flexure is normal.   TRANSVERSE COLON:  The typical triangular pattern was noted. Findings below      HEPATIC FLEXURE: The hepatic flexure is normal.   ASCENDING COLON:  No  bleeding Findings below     CECUM:  The ileocecal valve appears normal.   TERMINAL ILEUM: The terminal ileum was not entered. Finding noted      [] mucosa normal      [] Diverticulosis     [] avm     [] Additional findings:      A     A polyp was identified. #4,   mm in size, located in the ascending colon removed by cold biopsy and sent for pathology  #8,   mm in size, located in the descending colon removed by cold snare and retrieved for pathology  #15,   mm in size, located in the transverse colon removed by snare cautery and retrieved for pathology The polypectomy site was reviewed and was free of hemorrhage. The colonoscope was slowly withdrawn >6 minute period and the instrument was retroflexed in the rectum. The rectal findings were:Protruding lesions:     -Internal Hemorrhoids  The patient tolerated the entire procedure well.       Blood Loss nil  No complications  Anesthesia  MAC  No crystalloids  No Implants  Assistants : per nursing documentation team members     For biopsy  Specimen verification by physician and nurse two sources, name,           social security numbers     Colon preparation was good    Recommendations:     - If adenoma is present, repeat colonoscopy in 3 years.       Copies sent to   Arlyn Sauceda MD  CC:  Juani Celestin MD

## 2021-12-16 RX ORDER — HYDROCHLOROTHIAZIDE 12.5 MG/1
TABLET ORAL
Qty: 90 TABLET | Refills: 3 | Status: SHIPPED | OUTPATIENT
Start: 2021-12-16

## 2022-03-18 PROBLEM — Z12.11 ENCOUNTER FOR HEMOCCULT SCREENING: Status: ACTIVE | Noted: 2017-01-24

## 2022-03-19 PROBLEM — M17.12 ARTHRITIS OF KNEE, LEFT: Status: ACTIVE | Noted: 2019-10-07

## 2022-03-19 PROBLEM — E66.01 SEVERE OBESITY WITH BODY MASS INDEX (BMI) OF 35.0 TO 39.9 WITH SERIOUS COMORBIDITY (HCC): Status: ACTIVE | Noted: 2018-06-29

## 2022-03-19 PROBLEM — Z96.652 S/P TKR (TOTAL KNEE REPLACEMENT), LEFT: Status: ACTIVE | Noted: 2019-10-07

## 2022-03-19 PROBLEM — E04.1 RIGHT THYROID NODULE: Status: ACTIVE | Noted: 2018-01-17

## 2022-03-19 PROBLEM — E21.0 HYPERPARATHYROIDISM, PRIMARY (HCC): Status: ACTIVE | Noted: 2021-02-24

## 2022-03-29 ENCOUNTER — OFFICE VISIT (OUTPATIENT)
Dept: INTERNAL MEDICINE CLINIC | Age: 62
End: 2022-03-29
Payer: COMMERCIAL

## 2022-03-29 VITALS
WEIGHT: 241.3 LBS | DIASTOLIC BLOOD PRESSURE: 80 MMHG | TEMPERATURE: 98 F | HEART RATE: 82 BPM | SYSTOLIC BLOOD PRESSURE: 132 MMHG | BODY MASS INDEX: 37.87 KG/M2 | OXYGEN SATURATION: 98 % | HEIGHT: 67 IN | RESPIRATION RATE: 18 BRPM

## 2022-03-29 DIAGNOSIS — R73.02 IGT (IMPAIRED GLUCOSE TOLERANCE): ICD-10-CM

## 2022-03-29 DIAGNOSIS — E66.01 SEVERE OBESITY WITH BODY MASS INDEX (BMI) OF 35.0 TO 39.9 WITH SERIOUS COMORBIDITY (HCC): ICD-10-CM

## 2022-03-29 DIAGNOSIS — I10 PRIMARY HYPERTENSION: Primary | ICD-10-CM

## 2022-03-29 DIAGNOSIS — E66.01 SEVERE OBESITY (BMI 35.0-39.9) WITH COMORBIDITY (HCC): ICD-10-CM

## 2022-03-29 DIAGNOSIS — Z98.890 S/P COLONOSCOPIC POLYPECTOMY: ICD-10-CM

## 2022-03-29 DIAGNOSIS — E21.0 HYPERPARATHYROIDISM, PRIMARY (HCC): ICD-10-CM

## 2022-03-29 DIAGNOSIS — N89.8 VAGINAL PRURITUS: ICD-10-CM

## 2022-03-29 DIAGNOSIS — E04.1 RIGHT THYROID NODULE: ICD-10-CM

## 2022-03-29 PROCEDURE — 99214 OFFICE O/P EST MOD 30 MIN: CPT | Performed by: INTERNAL MEDICINE

## 2022-03-29 RX ORDER — NYSTATIN AND TRIAMCINOLONE ACETONIDE 100000; 1 [USP'U]/G; MG/G
OINTMENT TOPICAL 2 TIMES DAILY
Qty: 30 G | Refills: 11 | Status: SHIPPED | OUTPATIENT
Start: 2022-03-29

## 2022-03-29 NOTE — PROGRESS NOTES
Josh Frye is a 62 y.o. female  Chief Complaint   Patient presents with    Hypertension     1. Have you been to the ER, urgent care clinic since your last visit? Hospitalized since your last visit? No     2. Have you seen or consulted any other health care providers outside of the [address] [place] [address] since your last visit? Include any pap smears or colon screening.   No

## 2022-03-29 NOTE — PROGRESS NOTES
SPORTS MEDICINE AND PRIMARY CARE  Agnes Vieyra MD, [address] [address]  Phone:  [phone]  Fax: [phone]       Chief Complaint   Patient presents with    Hypertension   . SUBJECTIVE:    Kylah Naidu is a 62 y.o. female Patient returns today with a known history of primary hyperparathyroidism, status post colonic polypectomy, obesity, right thyroid nodule, impaired glucose tolerance, primary hypertension and is seen for evaluation. Since we last saw her, Dr. Javier Vazquez removed a tubulovillous adenoma and recommended repeat colonoscopy in three years. We advised her that we need to repeat the colonoscopy in three years and she agrees with the plan. Other new complaints denied and patient is seen for evaluation. Current Outpatient Medications   Medication Sig Dispense Refill    nystatin-triamcinolone (MYCOLOG) 100,000-0.1 unit/gram-% ointment Apply  to affected area two (2) times a day. 30 g 11    hydroCHLOROthiazide (HYDRODIURIL) 12.5 mg tablet TAKE 1 TAB BY MOUTH DAILY. 90 Tablet 3    amLODIPine (NORVASC) 10 mg tablet TAKE 1/2 TABLET BY MOUTH EVERY DAY 45 Tablet 7    terconazole (TERAZOL 7) 0.4 % vaginal cream Insert 1 Applicator into vagina nightly.  (Patient not taking: Reported on 11/16/2021) 45 g 0     Past Medical History:   Diagnosis Date    Arthritis     Encounter for Hemoccult screening 01/24/2017    neg    Hot flash, menopausal 2015    Hyperparathyroidism, primary ([address] 02/24/2021    Hypertension 2000    Menopause     Obesity 2000    Pelvic mass in female     Prediabetes 12/29/17    Right thyroid nodule 01/17/2018    Atypia of undetermined significance - pt states dr. Yancey Yreka said no further eval    S/P colonoscopic polypectomy 11/16/2021    David Chakraborty MD tubular adenoma,tubulovillious 3 yrs    [place]    S/P TKR (total knee replacement), left 10/07/2019    Ely Cardenas MD     Past Surgical History:   Procedure Laterality Date    COLONOSCOPY N/A 2021    COL[facility] HX KNEE ARTHROSCOPY Right     HX KNEE ARTHROSCOPY Left     HX KNEE REPLACEMENT Left     HX TUBAL LIGATION       No Known Allergies      REVIEW OF SYSTEMS:  General: negative for - chills or fever  ENT: negative for - headaches, nasal congestion or tinnitus  Respiratory: negative for - cough, hemoptysis, shortness of breath or wheezing  Cardiovascular : negative for - chest pain, edema, palpitations or shortness of breath  Gastrointestinal: negative for - abdominal pain, blood in stools, heartburn or nausea/vomiting  Genito-Urinary: no dysuria, trouble voiding, or hematuria  Musculoskeletal: negative for - gait disturbance, joint pain, joint stiffness or joint swelling  Neurological: no TIA or stroke symptoms  Hematologic: no bruises, no bleeding, no swollen glands  Integument: no lumps, mole changes, nail changes or rash  Endocrine: no malaise/lethargy or unexpected weight changes      Social History     Socioeconomic History    Marital status:    Tobacco Use    Smoking status: Never Smoker    Smokeless tobacco: Never Used   Vaping Use    Vaping Use: Never used   Substance and Sexual Activity    Alcohol use: Yes    Drug use: Never    Sexual activity: Yes     Partners: Male   Social History Narrative    Habits: There is no history of smoking, alcohol abuse or drug abuse.           Social History:  The patient is  and lives with her . The patient is the mother of 29 25and 39year-old children and three grandchildren. The patient is gainfully employed as a CNA at [employer] She completed high school. Her Zoroastrianism background is Anabaptist.          Family History:  Father  at 79 of a sickness. Mother  of complications of alcoholism. She has eight siblings.      Family History   Problem Relation Age of Onset  Hypertension Mother     Diabetes Mother     No Known Problems Father     Diabetes Sister     Hypertension Sister     No Known Problems Brother     Breast Cancer Maternal Aunt     Hypertension Sister     Other Sister         SPINA BIFIDA    No Known Problems Brother     Anesth Problems Neg Hx        OBJECTIVE:    Visit Vitals  BP (!) 141/87 (BP 1 Location: Left upper arm, BP Patient Position: Sitting)   Pulse 82   Temp 98 °F (36.7 °C) (Oral)   Resp 18   Ht 5' 7\" (1.702 m)   Wt 241 lb 4.8 oz (109.5 kg)   SpO2 98%   BMI 37.79 kg/m²     CONSTITUTIONAL: well , well nourished, appears age appropriate  EYES: perrla, eom intact  ENMT:moist mucous membranes, pharynx clear  NECK: supple. Thyroid normal  RESPIRATORY: Chest: clear bilaterally   CARDIOVASCULAR: Heart: regular rate and rhythm  GASTROINTESTINAL: Abdomen: soft, bowel sounds active  HEMATOLOGIC: no pathological lymph nodes palpated  MUSCULOSKELETAL: Extremities: no edema, pulse 1+   INTEGUMENT: No unusual rashes or suspicious skin lesions noted. Nails appear normal.  NEUROLOGIC: non-focal exam   MENTAL STATUS: alert and oriented, appropriate affect           ASSESSMENT:  1. Primary hypertension    2. Vaginal pruritus    3. Severe obesity (BMI 35.0-39. 9) with comorbidity ([address]    4. IGT (impaired glucose tolerance)    5. Right thyroid nodule    6. Severe obesity with body mass index (BMI) of 35.0 to 39.9 with serious comorbidity ([address]    7. Hyperparathyroidism, primary ([address]    8. S/P colonoscopic polypectomy      Blood pressure repeat is 132/80, which is completely acceptable. Vaginal pruritus is controlled with Mycolog cream.    Obesity. She states she is going to work on it and wants to get down towards her ideal body weight. She has impaired glucose tolerance and we will check hemoglobin A1c. The right thyroid nodule was evaluated by Dr. Jaylon Lopez, who recommended no further evaluation.     There is a questionable history of primary hyperparathyroidism. We will repeat the calcium and PTH level, although I am not sure of the accuracy of the diagnosis. She will be back to see us in six months, sooner if needed. I have discussed the diagnosis with the patient and the intended plan as seen in the  Orders. The patient understands and agees with the plan. The patient has   received an after visit summary and questions were answered concerning  future plans  Patient labs and/or xrays were reviewed  Past records were reviewed. PLAN:  .  Orders Placed This Encounter    HEMOGLOBIN A1C WITH EAG    RENAL FUNCTION PANEL    PTH INTACT    VITAMIN D, 25 HYDROXY    nystatin-triamcinolone (MYCOLOG) 100,000-0.1 unit/gram-% ointment       Follow-up and Dispositions    · Return in about 6 months (around 9/29/2022). ATTENTION:   This medical record was transcribed using an electronic medical records system. Although proofread, it may and can contain electronic and spelling errors. Other human spelling and other errors may be present. Corrections may be executed at a later time. Please feel free to contact us for any clarifications as needed.

## 2022-03-30 LAB
25(OH)D3 SERPL-MCNC: 14.9 NG/ML (ref 30–100)
ALBUMIN SERPL-MCNC: 3.9 G/DL (ref 3.5–5)
ANION GAP SERPL CALC-SCNC: 4 MMOL/L (ref 5–15)
BUN SERPL-MCNC: 17 MG/DL (ref 6–20)
BUN/CREAT SERPL: 20 (ref 12–20)
CALCIUM SERPL-MCNC: 10 MG/DL (ref 8.5–10.1)
CALCIUM SERPL-MCNC: 9.5 MG/DL (ref 8.5–10.1)
CHLORIDE SERPL-SCNC: 110 MMOL/L (ref 97–108)
CO2 SERPL-SCNC: 28 MMOL/L (ref 21–32)
CREAT SERPL-MCNC: 0.83 MG/DL (ref 0.55–1.02)
EST. AVERAGE GLUCOSE BLD GHB EST-MCNC: 134 MG/DL
GLUCOSE SERPL-MCNC: 89 MG/DL (ref 65–100)
HBA1C MFR BLD: 6.3 % (ref 4–5.6)
PHOSPHATE SERPL-MCNC: 3.4 MG/DL (ref 2.6–4.7)
POTASSIUM SERPL-SCNC: 3.9 MMOL/L (ref 3.5–5.1)
PTH-INTACT SERPL-MCNC: 76.9 PG/ML (ref 18.4–88)
SODIUM SERPL-SCNC: 142 MMOL/L (ref 136–145)

## 2022-03-30 RX ORDER — ERGOCALCIFEROL 1.25 MG/1
50000 CAPSULE ORAL
Qty: 4 CAPSULE | Refills: 2 | Status: SHIPPED | OUTPATIENT
Start: 2022-03-30

## 2022-03-31 NOTE — PROGRESS NOTES
Labs look good except vit D is low. Rx sent to your pharmacy and take for about 3 months and we will repeat when you return.

## 2022-07-02 NOTE — DISCHARGE INSTRUCTIONS
Endoscopy Discharge Instructions     Dr. Ramses Pacheco office                                            NAME: Mindi Shelley RECORD TDQOSY:[id number]    AGE:  62 y.o. YOB: 1960                                                              FINAL Discharge Procedure and Diagnosis:       Procedure(s):  COLONOSCOPY  ENDOSCOPIC POLYPECTOMY       FINDINGS:     Colon polyps removed   diverticulosis                                        MEDICATIONS    [x] CONTINUE CURRENT MEDICATIONS     [] NEW MEDICATIONS           1.    2.    3.         Testing   Schedule              Colonoscopy Screening                                   Recommendations       []  Repeat colonoscopy in 6-12 month         2nd to Inadequate  prep    [x]  Repeat colonoscopy in 3 years    []  Repeat colonoscopy in 5 years    []  Repeat colonoscopy in 10 years         New additional  Tests  Call the office   [phone]) for the appointment time      []      []      []                                     YOUR NEXT APPOINTMENT WITH DR Fidelia Parker:                                                                                                                                []   None follow up with pcp   []  1 week       []   2 week    [x]  1 month    Always keep Eduar Spann MD for regular medical follow up                                                                                                                         If you had a colonoscopy the \"C\" indicates specific instructions        x                                           Diet Instructions :   Ordinarily you may resume your previous diet but your initial diet should be       Light your discharge nurse will go over this with you. Large meals can cause  abdominal discomfort after these procedures. Specific Diet Recommendations:        [x] High fiber diet. https://www.[employer]/. com/diets/        [] GERD diet: avoid fried and fatty foods, peppermint, chocolate, alcohol,               coffee, citrus fruits and juices, and tomato products. Avoid lying down for            2 to 3  hours after eating. https://www.huntley.com/. com/diets/            []  FODMAP DIET  DeathUnit.nl              []  All diets eg high fiber, gastroparesis. , weight loss , gluten free             1. Network[employer]              2.  https://www.FoxyTunes. GlobaTrek/diets/           __x__  Nadir Christelle may feel quite tired and need to rest and recuperate for several hours    following these procedures. __x__  Due to the fact that sedation was administered for this procedure, do not drive,   operate machinery or sign legal documents for the next 24 hours. __x__  Mild abdominal pain may be experienced after your procedure, but is should   disappear after several hours. Notify your physician if you have persistent pain,   tenderness or abdominal distension. __x__  C    Many patients for the first few hours following the exam may experience         belching or passing gas through the rectum. Walking may help to relieve        distention and gas pains. A warm bath or shower will often help with abdominal  cramping.                                                                                            __x__   Nadir Christelle may return to your normal routine tomorrow, according to how you feel        and depending on your doctors instructions. Be sure to call your doctor to make  an appointment for a post-surgery check-up on the date your doctor has   requested.       __x__ C     Rectal bleeding or spotting in small amounts may occur with the first bowel   movement following a colonoscopy or sigmoidoscopy. If a large amount of blood is noted call immediately     __x__  You may experience a numbness or lack of sensation in throat. If present, do not     eat or drink. Before eating, test your ability to drink with small sips of water. Y     You may try clear liquids or soups. If you tolerate these, you may then eat solid     food which is not greasy or spicy. __x__ C     IF POLYPS REMOVED: Avoid any blood thinning medication such as plavix,   aspirin or coumadin  NSAIDS (like advil or alleve) for 7 days. __x__  Notify your physician if you cough or vomit blood or experience chest pain. Your biopsy or testing result should be available in 7-10 days                                                                                                                      Prescription will be electronically sent to your pharmacy you must     let your nurse know your pharmacy:                                                                                                                                          [address] [place] [address]. TO HELP ENSURE A SMOOTH RECOVERY,       IT IS IMPORTANT TO FOLLOW THEM. _x___Pamphlet /Educational Information provided for diagnostic findings     Additional education information can assessed at the sites below:   [email or web address]   http://www.digestive. niddk.nih.gov/ddiseases/a-z.asp      Web MD patient information                                                                                                Signature of individual given instructions :   Date: 11/16/2021                                                                                                                            Patient Education        Learning About Coronavirus ([id number])  What is coronavirus (COVID-19)? COVID-19 is a disease caused by a type of coronavirus.  This illness was first found in December 2019. It has since spread worldwide. Coronaviruses are a large group of viruses. They cause the common cold. They also cause more serious illnesses like Middle East respiratory syndrome (MERS) and severe acute respiratory syndrome (SARS). COVID-19 is caused by a novel coronavirus. That means it's a new type that has not been seen in people before. What are the symptoms? COVID-19 symptoms may include:  · Fever. · Cough. · Trouble breathing. · Chills or repeated shaking with chills. · Muscle and body aches. · Headache. · Sore throat. · New loss of taste or smell. · Vomiting. · Diarrhea. In severe cases, COVID-19 can cause pneumonia and make it hard to breathe without help from a machine. It can cause death. How is it diagnosed? COVID-19 is diagnosed with a viral test. This may also be called a PCR test or antigen test. It looks for evidence of the virus in your breathing passages or lungs (respiratory system). The test is most often done on a sample from the nose, throat, or lungs. It's sometimes done on a sample of saliva. One way a sample is collected is by putting a long swab into the back of your nose. How is it treated? Mild cases of COVID-19 can be treated at home. Serious cases need treatment in the hospital. Treatment may include medicines to reduce symptoms, plus breathing support such as oxygen therapy or a ventilator. Some people may be placed on their belly to help their oxygen levels. Treatments that may help people who have COVID-19 include:  Antiviral medicines. These medicines treat viral infections. Remdesivir is an example. Immune-based therapy. These medicines help the immune system fight COVID-19. Examples include monoclonal antibodies. Blood thinners. These medicines help prevent blood clots. People with severe illness are at risk for blood clots. How can you protect yourself and others?   The best way to protect yourself from getting sick is to:  · Get vaccinated. · Avoid sick people. · If you are not fully vaccinated:  ? Wear a mask if you have to go to public areas. ? Avoid crowds and try to stay at least 6 feet away from other people. · Cover your mouth with a tissue when you cough or sneeze. · Wash your hands often, especially after you cough or sneeze. Use soap and water, and scrub for at least 20 seconds. If soap and water aren't available, use an alcohol-based hand . · Avoid touching your mouth, nose, and eyes. To help avoid spreading the virus to others:  · Get vaccinated. · Cover your mouth with a tissue when you cough or sneeze. · Wash your hands often, especially after you cough or sneeze. Use soap and water, and scrub for at least 20 seconds. If soap and water aren't available, use an alcohol-based hand . · If you have been exposed to the virus and are not fully vaccinated:  ? Stay home. Don't go to school, work, or public areas. And don't use public transportation, ride-shares, or taxis unless you have no choice. ? Wear a mask if you have to go to public areas, like the pharmacy. · If you're sick:  ? Leave your home only if you need to get medical care. But call the doctor's office first so they know you're coming. And wear a mask. ? Wear a mask whenever you're around other people. ? Limit contact with pets and people in your home. If possible, stay in a separate bedroom and use a separate bathroom. ? Clean and disinfect your home every day. Use household  and disinfectant wipes or sprays. Take special care to clean things that you touch with your hands. How can you self-isolate when you have COVID-19? If you have COVID-19, there are things you can do to help avoid spreading the virus to others. · Limit contact with people in your home. If possible, stay in a separate bedroom and use a separate bathroom. · Wear a mask when you are around other people.   · If you have to leave home, avoid crowds and try to stay at least 6 feet away from other people. · Avoid contact with pets and other animals. · Cover your mouth and nose with a tissue when you cough or sneeze. Then throw it in the trash right away. · Wash your hands often, especially after you cough or sneeze. Use soap and water, and scrub for at least 20 seconds. If soap and water aren't available, use an alcohol-based hand . · Don't share personal household items. These include bedding, towels, cups and glasses, and eating utensils. · [address] in the warmest water allowed for the fabric type, and dry it completely. It's okay to wash other people's laundry with yours. · Clean and disinfect your home. Use household  and disinfectant wipes or sprays. When should you call for help? Call 911 anytime you think you may need emergency care. For example, call if you have life-threatening symptoms, such as:    · You have severe trouble breathing. (You can't talk at all.)     · You have constant chest pain or pressure.     · You are severely dizzy or lightheaded.     · You are confused or can't think clearly.     · You have pale, gray, or blue-colored skin or lips.     · You pass out (lose consciousness) or are very hard to wake up. Call your doctor now or seek immediate medical care if:    · You have moderate trouble breathing. (You can't speak a full sentence.)     · You are coughing up blood (more than about 1 teaspoon).     · You have signs of low blood pressure. These include feeling lightheaded; being too weak to stand; and having cold, pale, clammy skin. Watch closely for changes in your health, and be sure to contact your doctor if:    · Your symptoms get worse.     · You are not getting better as expected.     · You have new or worse symptoms of anxiety, depression, nightmares, or flashbacks. Call before you go to the doctor's office. Follow their instructions. And wear a mask.   Current as of: July 1, 2021               Content Version: 13.0  © 2006-2021 [employer]. Care instructions adapted under license by [employer] (which disclaims liability or warranty for this information). If you have questions about a medical condition or this instruction, always ask your healthcare professional. [address] any warranty or liability for your use of this information. DISCHARGE SUMMARY from Nurse    PATIENT INSTRUCTIONS:    After general anesthesia or intravenous sedation, for 24 hours or while taking prescription Narcotics:  · Limit your activities  · Do not drive and operate hazardous machinery  · Do not make important personal or business decisions  · Do  not drink alcoholic beverages  · If you have not urinated within 8 hours after discharge, please contact your surgeon on call. Report the following to your surgeon:  · Excessive pain, swelling, redness or odor of or around the surgical area  · Temperature over 100.5  · Nausea and vomiting lasting longer than 4 hours or if unable to take medications  · Any signs of decreased circulation or nerve impairment to extremity: change in color, persistent  numbness, tingling, coldness or increase pain  · Any questions      These are general instructions for a healthy lifestyle:    No smoking/ No tobacco products/ Avoid exposure to second hand smoke  Surgeon General's Warning:  Quitting smoking now greatly reduces serious risk to your health. Obesity, smoking, and sedentary lifestyle greatly increases your risk for illness    A healthy diet, regular physical exercise & weight monitoring are important for maintaining a healthy lifestyle    You may be retaining fluid if you have a history of heart failure or if you experience any of the following symptoms:  Weight gain of 3 pounds or more overnight or 5 pounds in a week, increased swelling in our hands or feet or shortness of breath while lying flat in bed.   Please call your doctor as soon as you notice any of these symptoms; do not wait until your next office visit. The discharge information has been reviewed with the patient and spouse. The patient and spouse verbalized understanding. Discharge medications reviewed with the patient and spouse and appropriate educational materials and side effects teaching were provided.   ___________________________________________________________________________________________________________________________________ Admitted

## 2022-09-01 ENCOUNTER — TRANSCRIBE ORDER (OUTPATIENT)
Dept: SCHEDULING | Age: 62
End: 2022-09-01

## 2022-09-01 DIAGNOSIS — Z12.31 ENCOUNTER FOR MAMMOGRAM TO ESTABLISH BASELINE MAMMOGRAM: Primary | ICD-10-CM

## 2022-09-08 ENCOUNTER — HOSPITAL ENCOUNTER (OUTPATIENT)
Dept: MAMMOGRAPHY | Age: 62
Discharge: HOME | End: 2022-09-08
Attending: INTERNAL MEDICINE
Payer: COMMERCIAL

## 2022-09-08 DIAGNOSIS — Z12.31 ENCOUNTER FOR MAMMOGRAM TO ESTABLISH BASELINE MAMMOGRAM: ICD-10-CM

## 2022-09-08 PROCEDURE — 77067 SCR MAMMO BI INCL CAD: CPT

## 2022-11-05 RX ORDER — AMLODIPINE BESYLATE 10 MG/1
TABLET ORAL
Qty: 45 TABLET | Refills: 7 | Status: SHIPPED | OUTPATIENT
Start: 2022-11-05 | End: 2022-11-14 | Stop reason: SDUPTHER

## 2022-11-14 ENCOUNTER — OFFICE VISIT (OUTPATIENT)
Dept: INTERNAL MEDICINE CLINIC | Age: 62
End: 2022-11-14
Payer: COMMERCIAL

## 2022-11-14 VITALS
TEMPERATURE: 97.4 F | SYSTOLIC BLOOD PRESSURE: 126 MMHG | OXYGEN SATURATION: 98 % | HEART RATE: 79 BPM | HEIGHT: 67 IN | DIASTOLIC BLOOD PRESSURE: 82 MMHG | BODY MASS INDEX: 34.48 KG/M2 | WEIGHT: 219.7 LBS | RESPIRATION RATE: 18 BRPM

## 2022-11-14 DIAGNOSIS — R73.02 IGT (IMPAIRED GLUCOSE TOLERANCE): ICD-10-CM

## 2022-11-14 DIAGNOSIS — Z23 ENCOUNTER FOR IMMUNIZATION: ICD-10-CM

## 2022-11-14 DIAGNOSIS — E04.1 RIGHT THYROID NODULE: ICD-10-CM

## 2022-11-14 DIAGNOSIS — E66.9 OBESITY (BMI 30.0-34.9): ICD-10-CM

## 2022-11-14 DIAGNOSIS — E78.5 DYSLIPIDEMIA: ICD-10-CM

## 2022-11-14 DIAGNOSIS — N89.8 VAGINAL PRURITUS: ICD-10-CM

## 2022-11-14 DIAGNOSIS — E21.0 HYPERPARATHYROIDISM, PRIMARY (HCC): ICD-10-CM

## 2022-11-14 DIAGNOSIS — I10 PRIMARY HYPERTENSION: Primary | ICD-10-CM

## 2022-11-14 DIAGNOSIS — M17.12 ARTHRITIS OF KNEE, LEFT: ICD-10-CM

## 2022-11-14 DIAGNOSIS — M17.11 PRIMARY OSTEOARTHRITIS OF RIGHT KNEE: ICD-10-CM

## 2022-11-14 PROBLEM — E66.811 OBESITY (BMI 30.0-34.9): Status: ACTIVE | Noted: 2018-06-29

## 2022-11-14 PROCEDURE — 99214 OFFICE O/P EST MOD 30 MIN: CPT | Performed by: INTERNAL MEDICINE

## 2022-11-14 PROCEDURE — 3078F DIAST BP <80 MM HG: CPT | Performed by: INTERNAL MEDICINE

## 2022-11-14 PROCEDURE — 3074F SYST BP LT 130 MM HG: CPT | Performed by: INTERNAL MEDICINE

## 2022-11-14 PROCEDURE — 90686 IIV4 VACC NO PRSV 0.5 ML IM: CPT | Performed by: INTERNAL MEDICINE

## 2022-11-14 PROCEDURE — 90471 IMMUNIZATION ADMIN: CPT | Performed by: INTERNAL MEDICINE

## 2022-11-14 RX ORDER — HYDROCHLOROTHIAZIDE 12.5 MG/1
12.5 TABLET ORAL DAILY
Qty: 90 TABLET | Refills: 3 | Status: SHIPPED | OUTPATIENT
Start: 2022-11-14

## 2022-11-14 RX ORDER — NYSTATIN AND TRIAMCINOLONE ACETONIDE 100000; 1 [USP'U]/G; MG/G
OINTMENT TOPICAL 2 TIMES DAILY
Qty: 30 G | Refills: 11 | Status: SHIPPED | OUTPATIENT
Start: 2022-11-14

## 2022-11-14 RX ORDER — AMLODIPINE BESYLATE 5 MG/1
5 TABLET ORAL DAILY
Qty: 90 TABLET | Refills: 3 | Status: SHIPPED | OUTPATIENT
Start: 2022-11-14

## 2022-11-14 NOTE — PROGRESS NOTES
Marybel Jean is a 65 y.o. female    Chief Complaint   Patient presents with    Hypertension     1. Have you been to the ER, urgent care clinic since your last visit? Hospitalized since your last visit? No    2. Have you seen or consulted any other health care providers outside of the [address] [place] [address] since your last visit? Include any pap smears or colon screening.  No

## 2022-11-14 NOTE — PROGRESS NOTES
SPORTS MEDICINE AND PRIMARY CARE  Marilyn Feldman MD, [address] [address]  Phone:  [phone]  Fax: [phone]       Chief Complaint   Patient presents with    Hypertension   . SUBJECTIVE:    Leeanna Melendez is a 65 y.o. female Patient returns today, going to [facility] and has been successful. She is having increasing pain in the right knee and previous orthopedic doctor retired and she would like to be referred to another orthopedic specialist.  Other medical problems include primary hypertension, right thyroid nodule, arthritis of left and right knees, but right knee being more symptomatic, obesity, primary hyperparathyroidism, impaired glucose tolerance and she is seen for evaluation. Current Outpatient Medications   Medication Sig Dispense Refill    amLODIPine (NORVASC) 5 mg tablet Take 1 Tablet by mouth daily. 90 Tablet 3    hydroCHLOROthiazide (HYDRODIURIL) 12.5 mg tablet Take 1 Tablet by mouth daily. 90 Tablet 3    nystatin-triamcinolone (MYCOLOG) 100,000-0.1 unit/gram-% ointment Apply  to affected area two (2) times a day. 30 g 11    ergocalciferol (ERGOCALCIFEROL) 1,250 mcg (50,000 unit) capsule Take 1 Capsule by mouth every seven (7) days. 4 Capsule 2    terconazole (TERAZOL 7) 0.4 % vaginal cream Insert 1 Applicator into vagina nightly.  (Patient not taking: No sig reported) 45 g 0     Past Medical History:   Diagnosis Date    Arthritis     Encounter for Hemoccult screening 01/24/2017    neg    Hot flash, menopausal 2015    Hyperparathyroidism, primary ([address] 02/24/2021    Hypertension 2000    Menopause     Obesity 2000    Pelvic mass in female     Prediabetes 12/29/2017    Right knee DJD 11/14/2022    Right thyroid nodule 01/17/2018    Atypia of undetermined significance - pt states dr. Pedro Alexandra said no further eval    S/P colonoscopic polypectomy 11/16/2021    Clyde Peck MD tubular adenoma,tubulovillious 3 yrs    S/P colonoscopy 2015    [place]    S/P TKR (total knee replacement), left 10/07/2019    Anabelle Pulido MD     Past Surgical History:   Procedure Laterality Date    COLONOSCOPY N/A 2021    COLONOSCOPY performed by Gokuljaycee Ghosh MD at [address] [place] [address] ARTHROSCOPY Right     HX KNEE ARTHROSCOPY Left     HX KNEE REPLACEMENT Left     HX TUBAL LIGATION       No Known Allergies      REVIEW OF SYSTEMS:  General: negative for - chills or fever  ENT: negative for - headaches, nasal congestion or tinnitus  Respiratory: negative for - cough, hemoptysis, shortness of breath or wheezing  Cardiovascular : negative for - chest pain, edema, palpitations or shortness of breath  Gastrointestinal: negative for - abdominal pain, blood in stools, heartburn or nausea/vomiting  Genito-Urinary: no dysuria, trouble voiding, or hematuria  Musculoskeletal: negative for - gait disturbance, joint pain, joint stiffness or joint swelling  Neurological: no TIA or stroke symptoms  Hematologic: no bruises, no bleeding, no swollen glands  Integument: no lumps, mole changes, nail changes or rash  Endocrine: no malaise/lethargy or unexpected weight changes      Social History     Socioeconomic History    Marital status:    Tobacco Use    Smoking status: Never    Smokeless tobacco: Never   Vaping Use    Vaping Use: Never used   Substance and Sexual Activity    Alcohol use: Yes    Drug use: Never    Sexual activity: Yes     Partners: Male   Social History Narrative    Habits: There is no history of smoking, alcohol abuse or drug abuse. Social History:  The patient is  and lives with her . The patient is the mother of 29 25and 39year-old children and three grandchildren. The patient is gainfully employed as a CNA at [employer] She completed high school. Her Denominational background is [employer]ybeterian.          Family History:  Father  at 79 of a sickness.   Mother  of complications of alcoholism. She has eight siblings. Family History   Problem Relation Age of Onset    Hypertension Mother     Diabetes Mother     No Known Problems Father     Diabetes Sister     Hypertension Sister     No Known Problems Brother     Breast Cancer Maternal Aunt     Hypertension Sister     Other Sister         SPINA BIFIDA    No Known Problems Brother     Anesth Problems Neg Hx        OBJECTIVE:    Visit Vitals  /82 (BP 1 Location: Right arm, BP Patient Position: Sitting)   Pulse 79   Temp 97.4 °F (36.3 °C) (Oral)   Resp 18   Ht 5' 7\" (1.702 m)   Wt 219 lb 11.2 oz (99.7 kg)   SpO2 98%   BMI 34.41 kg/m²     CONSTITUTIONAL: well , well nourished, appears age appropriate  EYES: perrla, eom intact  ENMT:moist mucous membranes, pharynx clear  NECK: supple. Thyroid normal  RESPIRATORY: Chest: clear bilaterally   CARDIOVASCULAR: Heart: regular rate and rhythm  GASTROINTESTINAL: Abdomen: soft, bowel sounds active  HEMATOLOGIC: no pathological lymph nodes palpated  MUSCULOSKELETAL: Extremities: no edema, pulse 1+   INTEGUMENT: No unusual rashes or suspicious skin lesions noted. Nails appear normal.  NEUROLOGIC: non-focal exam   MENTAL STATUS: alert and oriented, appropriate affect           ASSESSMENT:  1. Primary hypertension    2. Vaginal pruritus    3. Right thyroid nodule    4. Arthritis of knee, left    5. Obesity (BMI 30.0-34.9)    6. Hyperparathyroidism, primary ([address]    7. IGT (impaired glucose tolerance)    8. Primary osteoarthritis of right knee    9. Dyslipidemia      Blood pressure control is at goal.    Right thyroid nodule is unchanged. She has DJD of the left knee, which has had a TKR by Dr. Raine Jama. She now has primary osteoarthritis of right knee and is ready to have a TKR. She was severely obese at the last visit, weight is melting away with the [facility]. She has lost 21 lbs since we last saw her, we congratulate her. We continue to follow her PTH.   She has primary hyperparathyroidism. We will check a hemoglobin A1c for control of impaired glucose tolerance. She has no chest pain, she is able to do a flight of steps without difficulty and no shortness of breath associated with that. Patient currently is medically stable for any surgical procedures, however we expect to see her again before procedure is performed. She will be back to see me in four to six months, sooner if needed. We review care gaps with her. I have discussed the diagnosis with the patient and the intended plan as seen in the  Orders. The patient understands and agees with the plan. The patient has   received an after visit summary and questions were answered concerning  future plans  Patient labs and/or xrays were reviewed  Past records were reviewed. PLAN:  .  Orders Placed This Encounter    TSH 3RD GENERATION    LIPID PANEL    HEMOGLOBIN A1C WITH EAG    URINALYSIS W/ RFLX MICROSCOPIC    CBC WITH AUTOMATED DIFF    METABOLIC PANEL, COMPREHENSIVE    PTH INTACT    VITAMIN D, 25 HYDROXY    TSH 3RD GENERATION    [facility]rez Knee [facility] EMPL    amLODIPine (NORVASC) 5 mg tablet    hydroCHLOROthiazide (HYDRODIURIL) 12.5 mg tablet    nystatin-triamcinolone (MYCOLOG) 100,000-0.1 unit/gram-% ointment         Follow-up and Dispositions    Return in about 6 months (around 5/14/2023). ATTENTION:   This medical record was transcribed using an electronic medical records system. Although proofread, it may and can contain electronic and spelling errors. Other human spelling and other errors may be present. Corrections may be executed at a later time. Please feel free to contact us for any clarifications as needed.

## 2022-11-15 LAB
25(OH)D3 SERPL-MCNC: 43.8 NG/ML (ref 30–100)
ALBUMIN SERPL-MCNC: 3.9 G/DL (ref 3.5–5)
ALBUMIN/GLOB SERPL: 1 {RATIO} (ref 1.1–2.2)
ALP SERPL-CCNC: 63 U/L (ref 45–117)
ALT SERPL-CCNC: 23 U/L (ref 12–78)
ANION GAP SERPL CALC-SCNC: 5 MMOL/L (ref 5–15)
APPEARANCE UR: CLEAR
AST SERPL-CCNC: 12 U/L (ref 15–37)
BASOPHILS # BLD: 0.1 K/UL (ref 0–0.1)
BASOPHILS NFR BLD: 1 % (ref 0–1)
BILIRUB SERPL-MCNC: 0.5 MG/DL (ref 0.2–1)
BILIRUB UR QL: NEGATIVE
BUN SERPL-MCNC: 17 MG/DL (ref 6–20)
BUN/CREAT SERPL: 20 (ref 12–20)
CALCIUM SERPL-MCNC: 10.1 MG/DL (ref 8.5–10.1)
CALCIUM SERPL-MCNC: 10.3 MG/DL (ref 8.5–10.1)
CHLORIDE SERPL-SCNC: 107 MMOL/L (ref 97–108)
CHOLEST SERPL-MCNC: 139 MG/DL
CO2 SERPL-SCNC: 31 MMOL/L (ref 21–32)
COLOR UR: NORMAL
CREAT SERPL-MCNC: 0.85 MG/DL (ref 0.55–1.02)
DIFFERENTIAL METHOD BLD: ABNORMAL
EOSINOPHIL # BLD: 0.3 K/UL (ref 0–0.4)
EOSINOPHIL NFR BLD: 6 % (ref 0–7)
ERYTHROCYTE [DISTWIDTH] IN BLOOD BY AUTOMATED COUNT: 15 % (ref 11.5–14.5)
EST. AVERAGE GLUCOSE BLD GHB EST-MCNC: 126 MG/DL
GLOBULIN SER CALC-MCNC: 3.8 G/DL (ref 2–4)
GLUCOSE SERPL-MCNC: 109 MG/DL (ref 65–100)
GLUCOSE UR STRIP.AUTO-MCNC: NEGATIVE MG/DL
HBA1C MFR BLD: 6 % (ref 4–5.6)
HCT VFR BLD AUTO: 41.5 % (ref 35–47)
HDLC SERPL-MCNC: 56 MG/DL
HDLC SERPL: 2.5 {RATIO} (ref 0–5)
HGB BLD-MCNC: 13.4 G/DL (ref 11.5–16)
HGB UR QL STRIP: NEGATIVE
IMM GRANULOCYTES # BLD AUTO: 0 K/UL (ref 0–0.04)
IMM GRANULOCYTES NFR BLD AUTO: 0 % (ref 0–0.5)
KETONES UR QL STRIP.AUTO: NEGATIVE MG/DL
LDLC SERPL CALC-MCNC: 63.6 MG/DL (ref 0–100)
LEUKOCYTE ESTERASE UR QL STRIP.AUTO: NEGATIVE
LYMPHOCYTES # BLD: 1.9 K/UL (ref 0.8–3.5)
LYMPHOCYTES NFR BLD: 37 % (ref 12–49)
MCH RBC QN AUTO: 26 PG (ref 26–34)
MCHC RBC AUTO-ENTMCNC: 32.3 G/DL (ref 30–36.5)
MCV RBC AUTO: 80.4 FL (ref 80–99)
MONOCYTES # BLD: 0.5 K/UL (ref 0–1)
MONOCYTES NFR BLD: 9 % (ref 5–13)
NEUTS SEG # BLD: 2.4 K/UL (ref 1.8–8)
NEUTS SEG NFR BLD: 47 % (ref 32–75)
NITRITE UR QL STRIP.AUTO: NEGATIVE
NRBC # BLD: 0 K/UL (ref 0–0.01)
NRBC BLD-RTO: 0 PER 100 WBC
PH UR STRIP: 5.5 [PH] (ref 5–8)
PLATELET # BLD AUTO: 341 K/UL (ref 150–400)
PMV BLD AUTO: 10.3 FL (ref 8.9–12.9)
POTASSIUM SERPL-SCNC: 3.5 MMOL/L (ref 3.5–5.1)
PROT SERPL-MCNC: 7.7 G/DL (ref 6.4–8.2)
PROT UR STRIP-MCNC: NEGATIVE MG/DL
PTH-INTACT SERPL-MCNC: 77.5 PG/ML (ref 18.4–88)
RBC # BLD AUTO: 5.16 M/UL (ref 3.8–5.2)
SODIUM SERPL-SCNC: 143 MMOL/L (ref 136–145)
SP GR UR REFRACTOMETRY: 1.01 (ref 1–1.03)
TRIGL SERPL-MCNC: 97 MG/DL (ref ?–150)
TSH SERPL DL<=0.05 MIU/L-ACNC: 1.87 UIU/ML (ref 0.36–3.74)
UROBILINOGEN UR QL STRIP.AUTO: 0.2 EU/DL (ref 0.2–1)
VLDLC SERPL CALC-MCNC: 19.4 MG/DL
WBC # BLD AUTO: 5.1 K/UL (ref 3.6–11)

## 2022-12-12 ENCOUNTER — OFFICE VISIT (OUTPATIENT)
Dept: ORTHOPEDIC SURGERY | Age: 62
End: 2022-12-12
Payer: COMMERCIAL

## 2022-12-12 VITALS — HEIGHT: 67 IN | BODY MASS INDEX: 34.53 KG/M2 | WEIGHT: 220 LBS

## 2022-12-12 DIAGNOSIS — M17.11 PRIMARY OSTEOARTHRITIS OF RIGHT KNEE: Primary | ICD-10-CM

## 2022-12-12 PROCEDURE — 99204 OFFICE O/P NEW MOD 45 MIN: CPT | Performed by: ORTHOPAEDIC SURGERY

## 2022-12-12 NOTE — LETTER
12/12/2022    Patient: Krystyna Celaya   YOB: 1960   Date of Visit: 12/12/2022     Juvenal Fernández MD  [facility]  [address] [address],[address]  [address]    Dear Juvenal Fernández MD,      Thank you for referring Ms. Krystyna Celaya to [facility] for evaluation. My notes for this consultation are attached. If you have questions, please do not hesitate to call me. I look forward to following your patient along with you.       Sincerely,    Diana Presley MD

## 2022-12-12 NOTE — PROGRESS NOTES
Sherrell Russo (: 1960) is a 65 y.o. female, patient, here for evaluation of the following chief complaint(s):  Knee Pain (Right /)       SUBJECTIVE/OBJECTIVE:  Sherrell Russo presents today complaining of progressive right knee pain. She relates having progressive symptoms in the right knee from osteoarthritis. Symptoms have progressed over the last few years. Worst at start up and with all weightbearing activities. Limits how long she can be on her feet walking or standing. Significant difficulty with stairs, walking on uneven ground, getting in and out of bed and in and out of a car. Generally she does not have rest pain or wakening night pain. Her job is significantly impacted by her right knee osteoarthritis as she works as a certified nurse assistant. Symptoms have progressed despite significant intentional weight loss. Dr. Isabel May gave her a corticosteroid injection once last year which provided short-term improvement of symptoms. She tries to avoid anti-inflammatory medication due to side effects. She is very unhappy with progressive pain and dysfunction, declining quality of life. History of left total knee arthroplasty by Dr. Isabel May 10/2019. Left knee continues to do well, no complaints. PHYSICAL EXAM:  Vitals: Ht 5' 7\" (1.702 m)   Wt 220 lb (99.8 kg)   BMI 34.46 kg/m²   Body mass index is 34.46 kg/m². 65y.o. year old F in no acute distress. Obese, but otherwise appears well. Ambulates with an antalgic gait pattern on the right. Negative Stinchfield on the right with preserved passive hip range of motion. Valgus alignment of the right knee. Medial and lateral joint line tenderness palpation. Range of motion 0-90 degrees. Pseudolaxity is present. Otherwise no instability. Motor 5/5. No distal edema. 2+ dorsalis pedal pulse right lower extremity.     IMAGING:  Radiographs: XR Results (most recent):  Results from Appointment encounter on 22    XR KNEE RT MIN 4 V    Narrative  Four views (AP, PA-flex, lateral & sunrise) of the right knee were taken and reviewed today using digital radiography which reveal complete loss of lateral joint space with large marginal osteophytes, lateral joint erosion severe patellofemoral osteoarthritis. Incidental finding left total knee components, satisfactory position and alignment. ASSESSMENT/PLAN:  1. Primary osteoarthritis of right knee  2. Right knee pain, unspecified chronicity  -     XR KNEE RT MIN 4 V; Future  3. Status post left knee replacement    The xray and exam findings were discussed with the patient today. Severe right knee osteoarthritis. Discussed risks/benefits of conservative vs. operative interventions at this time to include NSAIDs, PT, cortisone injections, and surgery. Decreasing efficacy of cortisone injections over time. Symptoms have progressed despite conservative treatment measures outlined above and she desires to proceed with right total knee replacement. Discussed risks, benefits, and alternatives in detail, as well as anticipated hospital stay and course of rehabilitation. She will see primary care physician prior to surgery. All questions answered. Plan is for IV TXA intraoperatively, ASA for DVT prophylaxis. No follow-ups on file. Review Of Systems     Patient denies any recent fever, chills, nausea, vomiting, chest pain, or shortness of breath. No Known Allergies    Current Outpatient Medications   Medication Sig    amLODIPine (NORVASC) 5 mg tablet Take 1 Tablet by mouth daily. hydroCHLOROthiazide (HYDRODIURIL) 12.5 mg tablet Take 1 Tablet by mouth daily. nystatin-triamcinolone (MYCOLOG) 100,000-0.1 unit/gram-% ointment Apply  to affected area two (2) times a day. ergocalciferol (ERGOCALCIFEROL) 1,250 mcg (50,000 unit) capsule Take 1 Capsule by mouth every seven (7) days. terconazole (TERAZOL 7) 0.4 % vaginal cream Insert 1 Applicator into vagina nightly. (Patient not taking: No sig reported)     No current facility-administered medications for this visit.        Past Medical History:   Diagnosis Date    Arthritis     Encounter for Hemoccult screening 01/24/2017    neg    Hot flash, menopausal 2015    Hyperparathyroidism, primary ([address] 02/24/2021    Hypertension 2000    Menopause     Obesity 2000    Pelvic mass in female     Prediabetes 12/29/2017    Right knee DJD 11/14/2022    Right thyroid nodule 01/17/2018    Atypia of undetermined significance - pt states dr. Cristian Gonzalez said no further eval    S/P colonoscopic polypectomy 11/16/2021    Bulmaro Cote MD tubular adenoma,tubulovillious 3 yrs    S/P colonoscopy 11/2015    [place]    S/P TKR (total knee replacement), left 10/07/2019    Hussain Alejandra MD        Past Surgical History:   Procedure Laterality Date    COLONOSCOPY N/A 11/16/2021    COLONOSCOPY performed by Bulmaro Cote MD at [address] [place] [address] Drive ARTHROSCOPY Right 2013    HX KNEE ARTHROSCOPY Left 2015    HX KNEE REPLACEMENT Left 2019    HX TUBAL LIGATION         Family History   Problem Relation Age of Onset    Hypertension Mother     Diabetes Mother     No Known Problems Father     Diabetes Sister     Hypertension Sister     No Known Problems Brother     Breast Cancer Maternal Aunt     Hypertension Sister     Other Sister         SPINA BIFIDA    No Known Problems Brother     Anesth Problems Neg Hx         Social History     Socioeconomic History    Marital status:      Spouse name: Not on file    Number of children: Not on file    Years of education: Not on file    Highest education level: Not on file   Occupational History    Not on file   Tobacco Use    Smoking status: Never    Smokeless tobacco: Never   Vaping Use    Vaping Use: Never used   Substance and Sexual Activity    Alcohol use: Yes    Drug use: Never    Sexual activity: Yes     Partners: Male   Other Topics Concern    Not on file   Social History Narrative    Habits: There is no history of smoking, alcohol abuse or drug abuse. Social History:  The patient is  and lives with her . The patient is the mother of 29 25and 39year-old children and three grandchildren. The patient is gainfully employed as a CNA at [employer] She completed high school. Her [employer] background is Judaism.          Family History:  Father  at 79 of a sickness. Mother  of complications of alcoholism. She has eight siblings. Social Determinants of Health     Financial Resource Strain: Not on file   Food Insecurity: Not on file   Transportation Needs: Not on file   Physical Activity: Not on file   Stress: Not on file   Social Connections: Not on file   Intimate Partner Violence: Not on file   Housing Stability: Not on file       Orders Placed This Encounter    XR KNEE RT MIN 4 V     Standing Status:   Future     Number of Occurrences:   1     Standing Expiration Date:   2023      Magui Leiva. Sung Hook M.D. An electronic signature was used to authenticate this note.

## 2022-12-14 DIAGNOSIS — M17.11 PRIMARY OSTEOARTHRITIS OF RIGHT KNEE: Primary | ICD-10-CM

## 2023-01-26 ENCOUNTER — OFFICE VISIT (OUTPATIENT)
Dept: INTERNAL MEDICINE CLINIC | Age: 63
End: 2023-01-26
Payer: COMMERCIAL

## 2023-01-26 VITALS
DIASTOLIC BLOOD PRESSURE: 77 MMHG | OXYGEN SATURATION: 100 % | BODY MASS INDEX: 32.83 KG/M2 | WEIGHT: 209.2 LBS | TEMPERATURE: 98.6 F | RESPIRATION RATE: 20 BRPM | HEART RATE: 85 BPM | SYSTOLIC BLOOD PRESSURE: 111 MMHG | HEIGHT: 67 IN

## 2023-01-26 DIAGNOSIS — R73.02 IGT (IMPAIRED GLUCOSE TOLERANCE): ICD-10-CM

## 2023-01-26 DIAGNOSIS — I10 PRIMARY HYPERTENSION: ICD-10-CM

## 2023-01-26 DIAGNOSIS — E04.1 RIGHT THYROID NODULE: ICD-10-CM

## 2023-01-26 DIAGNOSIS — M17.11 PRIMARY OSTEOARTHRITIS OF RIGHT KNEE: ICD-10-CM

## 2023-01-26 DIAGNOSIS — E66.9 OBESITY (BMI 30.0-34.9): ICD-10-CM

## 2023-01-26 DIAGNOSIS — Z01.818 PREOP EXAMINATION: Primary | ICD-10-CM

## 2023-01-26 DIAGNOSIS — E21.0 HYPERPARATHYROIDISM, PRIMARY (HCC): ICD-10-CM

## 2023-01-26 NOTE — PROGRESS NOTES
Sara Garcia is a 65 y.o. female    Chief Complaint   Patient presents with    Pre-op Exam     1. Have you been to the ER, urgent care clinic since your last visit? Hospitalized since your last visit? No    2. Have you seen or consulted any other health care providers outside of the [address] [place] [address] since your last visit? Include any pap smears or colon screening.  No

## 2023-01-26 NOTE — PROGRESS NOTES
Patient with Long standing persistent (>12 months) atrial fibrillation which is uncontrolled currently with Beta Blocker and Calcium Channel Blocker. Patient is currently in atrial fibrillation.[id number] Score: 3. Anticoagulation not indicated due to age, risk.       SPORTS MEDICINE AND PRIMARY CARE  Kevin Gutierrez MD, [address] [address]  Phone:  [phone]  Fax: [phone]       Chief Complaint   Patient presents with    Pre-op Exam   .      SUBJECTIVE:    Dorothy Guthrie is a 65 y.o. female  Dictation on: 01/26/2023  4:12 PM by: Shann Crigler [[id number]]          Current Outpatient Medications   Medication Sig Dispense Refill    amLODIPine (NORVASC) 5 mg tablet Take 1 Tablet by mouth daily. 90 Tablet 3    hydroCHLOROthiazide (HYDRODIURIL) 12.5 mg tablet Take 1 Tablet by mouth daily. 90 Tablet 3    nystatin-triamcinolone (MYCOLOG) 100,000-0.1 unit/gram-% ointment Apply  to affected area two (2) times a day.  30 g 11     Past Medical History:   Diagnosis Date    Arthritis     Encounter for Hemoccult screening 01/24/2017    neg    Hot flash, menopausal 2015    Hyperparathyroidism, primary ([address] 02/24/2021    Hypertension 2000    Menopause     Obesity 2000    Pelvic mass in female     Prediabetes 12/29/2017    Preop examination 01/26/2023    Right knee DJD 11/14/2022    Right thyroid nodule 01/17/2018    Atypia of undetermined significance - pt states dr. Juan Ramon Thorpe said no further eval    S/P colonoscopic polypectomy 11/16/2021    Marci Gregg MD tubular adenoma,tubulovillious 3 yrs    S/P colonoscopy 11/2015    [place]    S/P TKR (total knee replacement), left 10/07/2019    Guevara Klein MD     Past Surgical History:   Procedure Laterality Date    COLONOSCOPY N/A 11/16/2021    COLONOSCOPY performed by Marci Gregg MD at [address] [place] [address] Drive ARTHROSCOPY Right 2013    HX KNEE ARTHROSCOPY Left 2015    HX KNEE REPLACEMENT Left 2019    HX TUBAL LIGATION       No Known Allergies      REVIEW OF SYSTEMS:  General: negative for - chills or fever  ENT: negative for - headaches, nasal congestion or tinnitus  Respiratory: negative for - cough, hemoptysis, shortness of breath or wheezing  Cardiovascular : negative for - chest pain, edema, palpitations or shortness of breath  Gastrointestinal: negative for - abdominal pain, blood in stools, heartburn or nausea/vomiting  Genito-Urinary: no dysuria, trouble voiding, or hematuria  Musculoskeletal: negative for - gait disturbance, joint pain, joint stiffness or joint swelling  Neurological: no TIA or stroke symptoms  Hematologic: no bruises, no bleeding, no swollen glands  Integument: no lumps, mole changes, nail changes or rash  Endocrine: no malaise/lethargy or unexpected weight changes      Social History     Socioeconomic History    Marital status:    Tobacco Use    Smoking status: Never    Smokeless tobacco: Never   Vaping Use    Vaping Use: Never used   Substance and Sexual Activity    Alcohol use: Yes    Drug use: Never    Sexual activity: Yes     Partners: Male   Social History Narrative    Habits: There is no history of smoking, alcohol abuse or drug abuse. Social History:  The patient is  and lives with her . The patient is the mother of 29 25and 39year-old children and three grandchildren. 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 Her Roman Catholic background is [employer]ism.          Family History:  Father  at 79 of a sickness. Mother  of complications of alcoholism. She has eight siblings.      Family History   Problem Relation Age of Onset    Hypertension Mother     Diabetes Mother     No Known Problems Father     Diabetes Sister     Hypertension Sister     No Known Problems Brother     Breast Cancer Maternal Aunt     Hypertension Sister     Other Sister         SPINA BIFIDA    No Known Problems Brother     Anesth Problems Neg Hx        OBJECTIVE:    Visit Vitals  /77 (BP 1 Location: Left upper arm, BP Patient Position: Sitting)   Pulse 85   Temp 98.6 °F (37 °C) (Oral)   Resp 20   Ht 5' 7\" (1.702 m)   Wt 209 lb 3.2 oz (94.9 kg)   SpO2 100%   BMI 32.77 kg/m²     CONSTITUTIONAL: well , well nourished, appears age appropriate  EYES: perrla, eom intact  ENMT:moist mucous membranes, pharynx clear  NECK: supple. Thyroid normal  RESPIRATORY: Chest: clear bilaterally   CARDIOVASCULAR: Heart: regular rate and rhythm  GASTROINTESTINAL: Abdomen: soft, bowel sounds active  HEMATOLOGIC: no pathological lymph nodes palpated  MUSCULOSKELETAL: Extremities: no edema, pulse 1+   INTEGUMENT: No unusual rashes or suspicious skin lesions noted. Nails appear normal.  NEUROLOGIC: non-focal exam   MENTAL STATUS: alert and oriented, appropriate affect           ASSESSMENT:  1. Preop examination    2. Primary osteoarthritis of right knee    3. Obesity (BMI 30.0-34.9)    4. Hyperparathyroidism, primary ([address]    5. IGT (impaired glucose tolerance)    6. Right thyroid nodule    7. Primary hypertension     Dictation on: 01/26/2023  4:16 PM by: Margarito Esquivel       I have discussed the diagnosis with the patient and the intended plan as seen in the  Orders. The patient understands and agees with the plan. The patient has   received an after visit summary and questions were answered concerning  future plans  Patient labs and/or xrays were reviewed  Past records were reviewed. PLAN:  .  Orders Placed This Encounter    CBC WITH AUTOMATED DIFF    RENAL FUNCTION PANEL    URINALYSIS W/ REFLEX CULTURE    AMB POC EKG ROUTINE W/ 12 LEADS, INTER & REP       Follow-up and Dispositions    Return in about 3 months (around 4/26/2023). ATTENTION:   This medical record was transcribed using an electronic medical records system. Although proofread, it may and can contain electronic and spelling errors. Other human spelling and other errors may be present. Corrections may be executed at a later time. Please feel free to contact us for any clarifications as needed.

## 2023-01-27 LAB
ALBUMIN SERPL-MCNC: 4 G/DL (ref 3.5–5)
ANION GAP SERPL CALC-SCNC: 6 MMOL/L (ref 5–15)
APPEARANCE UR: CLEAR
BACTERIA URNS QL MICRO: NEGATIVE /HPF
BASOPHILS # BLD: 0.1 K/UL (ref 0–0.1)
BASOPHILS NFR BLD: 1 % (ref 0–1)
BILIRUB UR QL: NEGATIVE
BUN SERPL-MCNC: 12 MG/DL (ref 6–20)
BUN/CREAT SERPL: 14 (ref 12–20)
CALCIUM SERPL-MCNC: 10.1 MG/DL (ref 8.5–10.1)
CHLORIDE SERPL-SCNC: 105 MMOL/L (ref 97–108)
CO2 SERPL-SCNC: 30 MMOL/L (ref 21–32)
COLOR UR: ABNORMAL
CREAT SERPL-MCNC: 0.83 MG/DL (ref 0.55–1.02)
DIFFERENTIAL METHOD BLD: ABNORMAL
EOSINOPHIL # BLD: 0.2 K/UL (ref 0–0.4)
EOSINOPHIL NFR BLD: 3 % (ref 0–7)
EPITH CASTS URNS QL MICRO: ABNORMAL /LPF
ERYTHROCYTE [DISTWIDTH] IN BLOOD BY AUTOMATED COUNT: 14.7 % (ref 11.5–14.5)
GLUCOSE SERPL-MCNC: 77 MG/DL (ref 65–100)
GLUCOSE UR STRIP.AUTO-MCNC: NEGATIVE MG/DL
HCT VFR BLD AUTO: 40.8 % (ref 35–47)
HGB BLD-MCNC: 13.3 G/DL (ref 11.5–16)
HGB UR QL STRIP: NEGATIVE
IMM GRANULOCYTES # BLD AUTO: 0 K/UL (ref 0–0.04)
IMM GRANULOCYTES NFR BLD AUTO: 0 % (ref 0–0.5)
KETONES UR QL STRIP.AUTO: NEGATIVE MG/DL
LEUKOCYTE ESTERASE UR QL STRIP.AUTO: ABNORMAL
LYMPHOCYTES # BLD: 2.3 K/UL (ref 0.8–3.5)
LYMPHOCYTES NFR BLD: 42 % (ref 12–49)
MCH RBC QN AUTO: 25.9 PG (ref 26–34)
MCHC RBC AUTO-ENTMCNC: 32.6 G/DL (ref 30–36.5)
MCV RBC AUTO: 79.4 FL (ref 80–99)
MONOCYTES # BLD: 0.4 K/UL (ref 0–1)
MONOCYTES NFR BLD: 7 % (ref 5–13)
NEUTS SEG # BLD: 2.6 K/UL (ref 1.8–8)
NEUTS SEG NFR BLD: 47 % (ref 32–75)
NITRITE UR QL STRIP.AUTO: NEGATIVE
NRBC # BLD: 0 K/UL (ref 0–0.01)
NRBC BLD-RTO: 0 PER 100 WBC
PH UR STRIP: 5 (ref 5–8)
PHOSPHATE SERPL-MCNC: 3.2 MG/DL (ref 2.6–4.7)
PLATELET # BLD AUTO: 298 K/UL (ref 150–400)
PMV BLD AUTO: 10.5 FL (ref 8.9–12.9)
POTASSIUM SERPL-SCNC: 3.8 MMOL/L (ref 3.5–5.1)
PROT UR STRIP-MCNC: NEGATIVE MG/DL
RBC # BLD AUTO: 5.14 M/UL (ref 3.8–5.2)
RBC #/AREA URNS HPF: ABNORMAL /HPF (ref 0–5)
SODIUM SERPL-SCNC: 141 MMOL/L (ref 136–145)
SP GR UR REFRACTOMETRY: 1.01 (ref 1–1.03)
UA: UC IF INDICATED,UAUC: ABNORMAL
UROBILINOGEN UR QL STRIP.AUTO: 0.2 EU/DL (ref 0.2–1)
WBC # BLD AUTO: 5.6 K/UL (ref 3.6–11)
WBC URNS QL MICRO: ABNORMAL /HPF (ref 0–4)

## 2023-01-30 ENCOUNTER — HOSPITAL ENCOUNTER (OUTPATIENT)
Dept: PREADMISSION TESTING | Age: 63
Discharge: HOME | End: 2023-01-30
Payer: COMMERCIAL

## 2023-01-30 VITALS
WEIGHT: 210.1 LBS | HEIGHT: 67 IN | TEMPERATURE: 98.2 F | SYSTOLIC BLOOD PRESSURE: 126 MMHG | BODY MASS INDEX: 32.98 KG/M2 | DIASTOLIC BLOOD PRESSURE: 86 MMHG | HEART RATE: 69 BPM

## 2023-01-30 LAB
ABO + RH BLD: NORMAL
ANION GAP SERPL CALC-SCNC: 4 MMOL/L (ref 5–15)
APPEARANCE UR: CLEAR
BACTERIA URNS QL MICRO: NEGATIVE /HPF
BILIRUB UR QL: NEGATIVE
BLOOD GROUP ANTIBODIES SERPL: NORMAL
BUN SERPL-MCNC: 15 MG/DL (ref 6–20)
BUN/CREAT SERPL: 20 (ref 12–20)
CALCIUM SERPL-MCNC: 9.5 MG/DL (ref 8.5–10.1)
CHLORIDE SERPL-SCNC: 107 MMOL/L (ref 97–108)
CO2 SERPL-SCNC: 29 MMOL/L (ref 21–32)
COLOR UR: ABNORMAL
CREAT SERPL-MCNC: 0.76 MG/DL (ref 0.55–1.02)
EPITH CASTS URNS QL MICRO: ABNORMAL /LPF
GLUCOSE SERPL-MCNC: 105 MG/DL (ref 65–100)
GLUCOSE UR STRIP.AUTO-MCNC: NEGATIVE MG/DL
HGB UR QL STRIP: NEGATIVE
INR PPP: 1 (ref 0.9–1.1)
KETONES UR QL STRIP.AUTO: NEGATIVE MG/DL
LEUKOCYTE ESTERASE UR QL STRIP.AUTO: ABNORMAL
NITRITE UR QL STRIP.AUTO: NEGATIVE
PH UR STRIP: 6 (ref 5–8)
POTASSIUM SERPL-SCNC: 3.2 MMOL/L (ref 3.5–5.1)
PROT UR STRIP-MCNC: NEGATIVE MG/DL
PROTHROMBIN TIME: 10.9 SEC (ref 9–11.1)
RBC #/AREA URNS HPF: ABNORMAL /HPF (ref 0–5)
SODIUM SERPL-SCNC: 140 MMOL/L (ref 136–145)
SP GR UR REFRACTOMETRY: 1.01 (ref 1–1.03)
SPECIMEN EXP DATE BLD: NORMAL
UA: UC IF INDICATED,UAUC: ABNORMAL
UROBILINOGEN UR QL STRIP.AUTO: 0.2 EU/DL (ref 0.2–1)
WBC URNS QL MICRO: ABNORMAL /HPF (ref 0–4)

## 2023-01-30 PROCEDURE — 86900 BLOOD TYPING SEROLOGIC ABO: CPT

## 2023-01-30 PROCEDURE — 81001 URINALYSIS AUTO W/SCOPE: CPT

## 2023-01-30 PROCEDURE — 36415 COLL VENOUS BLD VENIPUNCTURE: CPT

## 2023-01-30 PROCEDURE — 80048 BASIC METABOLIC PNL TOTAL CA: CPT

## 2023-01-30 PROCEDURE — 85610 PROTHROMBIN TIME: CPT

## 2023-01-30 NOTE — PERIOP NOTES
[address] INSTRUCTIONS  ORTHOPAEDIC    Surgery Date:   2/7/23    Your surgeon's office or [facility] staff will call you between 4 PM- 8 PM the day before surgery with your arrival time. If your surgery is on a Monday, you will receive a call the preceding Friday. Please report to [facility] Patient Access/Admitting on the 1st floor. Bring your insurance card, photo identification, and any copayment (if applicable). If you are going home the same day of your surgery, you must have a responsible adult to drive you home. You need to have a responsible adult to stay with you the first 24 hours after surgery and you should not drive a car for 24 hours following your surgery. Do NOT eat any solid foods after midnight the night before surgery including candy, mints or gum. You may drink clear liquids from midnight until 1 hour prior to arrival time. You may drink up to 12 ounces at one time every 4 hours. Do NOT drink alcohol or smoke 24 hours before surgery. STOP smoking for 14 days prior as it helps with breathing and healing after surgery. If your arrival time is 3pm or later, you may eat a light breakfast before 8am (toast, bagel-no butter, black coffee, plain tea, fruit juice-no pulp) Please note special instructions, if applicable, below for medications. If you are being admitted to the hospital,please leave personal belongings/luggage in your car until you have an assigned hospital room number. Please wear comfortable clothes. Wear your glasses instead of contacts. We ask that all money, jewelry and valuables be left at home. Wear no make up, particularly mascara, the day of surgery. All body piercings, rings, and jewelry need to be removed and left at home. Please remove any nail polish or artificial nails from your fingernails. Please wear your hair loose or down. Please no pony-tails, buns, or any metal hair accessories.  If you shower the morning of surgery, please do not apply any lotions or powders afterwards. You may wear deodorant. Do not shave any body area within 24 hours of your surgery. Please follow all instructions to avoid any potential surgical cancellation. Should your physical condition change, (i.e. fever, cold, flu, etc.) please notify your surgeon as soon as possible. It is important to be on time. If a situation occurs where you may be delayed, please call:  [phone] / [phone] on the day of surgery. The Preadmission Testing staff can be reached at [phone]. Special instructions: [address] DAY OF SURGERY    Current Outpatient Medications   Medication Sig    amLODIPine (NORVASC) 5 mg tablet Take 1 Tablet by mouth daily. hydroCHLOROthiazide (HYDRODIURIL) 12.5 mg tablet Take 1 Tablet by mouth daily. nystatin-triamcinolone (MYCOLOG) 100,000-0.1 unit/gram-% ointment Apply  to affected area two (2) times a day. No current facility-administered medications for this encounter. YOU MUST ONLY TAKE THESE MEDICATIONS THE MORNING OF SURGERY WITH A SIP OF WATER: AMLODIPINE    MEDICATIONS TO TAKE THE MORNING OF SURGERY ONLY IF NEEDED: NONE    HOLD these prescription medications BEFORE Surgery: NONE    Ask your surgeon/prescribing physician about when/if to STOP taking these medications: NONE    Stop any non-steroidal anti-inflammatory drugs (i.e. Ibuprofen, Naproxen, Advil, Aleve) 3 days before surgery. You may take Tylenol. STOP all vitamins and herbal supplements 1 week prior to  surgery. If you are currently taking Plavix, Coumadin, or any other blood-thinning/anticoagulant medication contact your prescribing physician for instructions. Preventing Infections Before and After - Your Surgery    IMPORTANT INSTRUCTIONS    You play an important role in your health and preparation for surgery. To reduce the germs on your skin you will need to shower with CHG soap (Chorhexidine gluconate 4%) two times before surgery.     CHG soap (Hibiclens, Hex-A-Clens or store brand)  CHG soap will be provided at your Preadmission Testing (PAT) appointment. If you do not have a PAT appointment before surgery, you may arrange to  CHG soap from our office or purchase CHG soap at a pharmacy, grocery or department store. You need to purchase TWO 4 ounce bottles to use for your 2 showers. Steps to follow:  Almer Debar your hair with your normal shampoo and your body with regular soap and rinse well to remove shampoo and soap from your skin. Wet a clean washcloth and turn off the shower. Put CHG soap on washcloth and apply to your entire body from the neck down. Do not use on your head, face or private parts(genitals). Do not use CHG soap on open sores, wounds or areas of skin irritation. Wash you body gently for 5 minutes. Do not wash your skin too hard. This soap does not create lather. Pay special attention to your underarms and from your belly button to your feet. Turn the shower back on and rinse well to get CHG soap off your body. Pat your skin dry with a clean, dry towel. Do not apply lotions or moisturizer. Put on clean clothes and sleep on fresh bed sheets and do not allow pets to sleep with you. Shower with CHG soap 2 times before your surgery  The evening before your surgery  The morning of your surgery      Tips to help prevent infections after your surgery:  Protect your surgical wound from germs:  Hand washing is the most important thing you and your caregivers can do to prevent infections. Keep your bandage clean and dry! Do not touch your surgical wound. Use clean, freshly washed towels and washcloths every time you shower; do not share bath linens with others. Until your surgical wound is healed, wear clothing and sleep on bed linens each day that are clean and freshly washed. Do not allow pets to sleep in your bed with you or touch your surgical wound. Do not smoke - smoking delays wound healing.  This may be a good time to stop smoking. If you have diabetes, it is important for you to manage your blood sugar levels properly before your surgery as well as after your surgery. Poorly managed blood sugar levels slow down wound healing and prevent you from healing completely. Prevention of Infection  Testing for Staphylococcus aureus on your skin before surgery    Staphylococcus aureus (staph) is a common bacteria that is found on the body. It normally does not cause infection on healthy skin. Before surgery, you will be tested to see if you have staph by swabbing the inside of your nose. When you have an incision with surgery, the goal is to protect that incision from infection. Removal of the staph bacteria before surgery can decrease the risk of a surgical site infection. If your nose swab is positive for staph you will be called. Your treatment will include 2 steps:  Prescription for Mupirocin ointment to be used in each nostril twice a day for 5 days. Showering with Chlorhexidine (CHG) liquid soap for 5 days prior to surgery. How to use Mupirocin ointment in your nose   the prescription from your pharmacy. You will receive a large tube of ointment which will be big enough for all of your treatments. You will apply this ointment to each nostril 2 times a day for 5 days. Wash your hands with  gel or soap and water for 20 seconds before using ointment. Place a pea-sized amount of ointment on a cotton Q-tip. Apply ointment just inside of each nostril with the Q-tip. Do not push Q-tip or ointment deep inside you nose. Press your nostrils together and massage for a few seconds. Wash your hands with  gel or soap and water after you are finished. Do not get ointment near your eyes. If it gets into your eyes, rinse them with cool water. If you need to use nasal spray, clean the tip of the bottle with alcohol before use and do not use both at the same time.   If you are scheduled for COVID testing during the 5 days, do NOT apply morning dose until after the COVID test has been performed. How to use Chlorhexidine (CHG) 4% liquid soap  Purchase an 8 ounce bottle of CHG liquid soap (Chlorhexidine 4%, Hibiclens, Hex-A-Clens or store brand) at a pharmacy or grocery store. Wash your hair with your normal shampoo and your body with regular soap and rinse well to remove shampoo and soap from your skin. Wet a clean washcloth and turn off the shower. Put CHG soap on washcloth and apply to your entire body from the neck down. Do not use on your head, face or private parts(genitals). Do not use CHG soap on open sores, wounds or areas of skin irritation. Wash your body gently for 5 minutes. Do not wash your skin too hard. This soap does not create lather. Pay special attention to your underarms and from your belly button to your feet. Turn the shower back on and rinse well to get CHG soap off your body. Pat your skin dry with a clean, dry towel. Do not apply lotions or moisturizer. Put on clean clothes and sleep on fresh bed sheets the night before surgery. Do not allow pets to sleep with you. Eating and Drinking Before Surgery    You may eat a regular dinner at the usual time on the day before your surgery. Do NOT eat any solid foods after midnight unless your arrival time at the hospital is 3pm or later. You may drink clear liquids only from 12 midnight until 1 hours prior to your arrival time at the hospital on the day of your surgery. Do NOT drink alcohol.   Clear liquids include:  Water  Fruit juices without pulp( i.e. apple juice)  Carbonated beverages  Black coffee (no cream/milk)  Tea (no cream/milk)  Gatorade  You may drink up to 12-16 ounces at one time every 4 hours between the hours of midnight and 1 hour before your arrival time at the hospital. Example- if your arrival time at the hospital is 6am, you may drink 12-16 ounces of clear liquids no later than 5am.  If your arrival time at the hospital is 3pm or later, you may eat a light breakfast before 8am.  A light breakfast includes: Toast or bagel (no butter)  Black coffee (no cream/milk)  Tea (no cream/milk)  Fruit juices without pulp ( i.e. apple juice)  Do NOT eat meat, eggs, vegetables or fruit  If you have any questions, please contact your surgeon's office. Patient Information Regarding COVID Restrictions    Day of Procedure    Please park in the parking deck or any designated visitor parking lot. Enter the facility through the Main Entrance of the hospital.  On the day of surgery, please provide the cell phone number of the person who will be waiting for you to the Patient Access representative at the time of registration. Masks are highly recommended in the hospital, but not required. Once your procedure and the immediate recovery period is completed, a nurse in the recovery area will contact your designated visitor to inform them of your room number or to otherwise review other pertinent information regarding your care. Social distancing practices are strongly encouraged in waiting areas and the cafeteria. The patient was contacted in person. She verbalized understanding of all instructions does not  need reinforcement.

## 2023-01-31 LAB
BACTERIA SPEC CULT: NORMAL
BACTERIA SPEC CULT: NORMAL
SERVICE CMNT-IMP: NORMAL

## 2023-01-31 NOTE — PERIOP NOTES
PAT Nurse Practitioner   Pre-Operative Chart Review/Assessment:-ORTHOPEDIC                Patient Name:  Shana Middleton                                                           Age:   65 y.o.    :  1960     Today's Date:  2023     Date of PAT:   23      Date of Surgery:    23      Procedure(s):  Right Total Knee Arthroplasty     Surgeon:   Dr. Craig Lux:      1)  Medical Clearance/PCP:  Lincoln Elise MD      2)  Cardiac Clearance:  EKG and METs reviewed. No further cardiac evaluation requested. EKG from PCP on 23 showed NSR. 3)  Diabetic Treatment Consult:  Not indicated. A1c-6.0      4)  Sleep Apnea evaluation:   Not indicated.  ANSHUL Score 2.       5) Treatment for MRSA/Staph Aureus:  Neg      6) Additional Concerns:  HTN, PreDM                Vital Signs:         Vitals:    23 1051   BP: 126/86   Pulse: 69   Temp: 98.2 °F (36.8 °C)   Weight: 95.3 kg (210 lb 1.6 oz)   Height: 5' 7\" (1.702 m)          Body mass index is 32.91 kg/m².         ____________________________________________  PAST MEDICAL HISTORY  Past Medical History:   Diagnosis Date    Arthritis     Encounter for Hemoccult screening 2017    neg    Hot flash, menopausal     Hyperparathyroidism, primary ([address] 2021    Hypertension 2000    Menopause     Obesity 2000    Pelvic mass in female     Prediabetes 2017    Preop examination 2023    Right knee DJD 2022    Right thyroid nodule 2018    Atypia of undetermined significance - pt states dr. William Whitman said no further eval    S/P colonoscopic polypectomy 2021    Justin Hyde MD tubular adenoma,tubulovillious 3 yrs    S/P colonoscopy 2015    briana bo    S/P TKR (total knee replacement), left 10/07/2019    Naomi Morales MD      ____________________________________________  PAST SURGICAL HISTORY  Past Surgical History:   Procedure Laterality Date    COLONOSCOPY N/A 2021 COLONOSCOPY performed by Jammie Morales MD at [address] [place] [address] Drive ARTHROSCOPY Right 2013    HX KNEE ARTHROSCOPY Left 2015    HX KNEE REPLACEMENT Left 2019    HX TUBAL LIGATION        ____________________________________________  HOME MEDICATIONS  Current Outpatient Medications   Medication Sig    amLODIPine (NORVASC) 5 mg tablet Take 1 Tablet by mouth daily. hydroCHLOROthiazide (HYDRODIURIL) 12.5 mg tablet Take 1 Tablet by mouth daily. nystatin-triamcinolone (MYCOLOG) 100,000-0.1 unit/gram-% ointment Apply  to affected area two (2) times a day.      No current facility-administered medications for this encounter.      ____________________________________________  ALLERGIES  No Known Allergies   ____________________________________________  SOCIAL HISTORY  Social History     Tobacco Use    Smoking status: Never    Smokeless tobacco: Never   Substance Use Topics    Alcohol use: Not Currently      ____________________________________________   Internal Administration   First Dose COVID-19, MODERNA BLUE border, Primary or Immunocompromised, (age 18y+), IM, 100 mcg/0.5mL  03/04/2021   Second Dose COVID-19, MODERNA BLUE border, Primary or Immunocompromised, (age 18y+), IM, 100 mcg/0.5mL  04/01/2021      Last COVID Lab SARS-CoV-2 ( )   Date Value   11/12/2021 Not detected                    Labs:     Hospital Outpatient Visit on 01/30/2023   Component Date Value Ref Range Status    Sodium 01/30/2023 140  136 - 145 mmol/L Final    Potassium 01/30/2023 3.2 (A)  3.5 - 5.1 mmol/L Final    Chloride 01/30/2023 107  97 - 108 mmol/L Final    CO2 01/30/2023 29  21 - 32 mmol/L Final    Anion gap 01/30/2023 4 (A)  5 - 15 mmol/L Final    Glucose 01/30/2023 105 (A)  65 - 100 mg/dL Final    BUN 01/30/2023 15  6 - 20 MG/DL Final    Creatinine 01/30/2023 0.76  0.55 - 1.02 MG/DL Final    BUN/Creatinine ratio 01/30/2023 20  12 - 20   Final    eGFR 01/30/2023 >60  >60 ml/min/1.73m2 Final    Comment:      Pediatric calculator link: [facility].at. org/professionals/kdoqi/gfr_calculatorped       These results are not intended for use in patients <25years of age. eGFR results are calculated without a race factor using  the 2021 CKD-EPI equation. Careful clinical correlation is recommended, particularly when comparing to results calculated using previous equations. The CKD-EPI equation is less accurate in patients with extremes of muscle mass, extra-renal metabolism of creatinine, excessive creatine ingestion, or following therapy that affects renal tubular secretion. Calcium 01/30/2023 9.5  8.5 - 10.1 MG/DL Final    Crossmatch Expiration 01/30/2023 02/10/2023,2359   Final    ABO/Rh(D) 01/30/2023 O POSITIVE   Final    Antibody screen 01/30/2023 NEG   Final    INR 01/30/2023 1.0  0.9 - 1.1   Final    A single therapeutic range for Vit K antagonists may not be optimal for all indications - see June, 2008 issue of Chest, American College of Chest Physicians Evidence-Based Clinical Practice Guidelines, 8th Edition. Prothrombin time 01/30/2023 10.9  9.0 - 11.1 sec Final    Color 01/30/2023 YELLOW/STRAW    Final    Color Reference Range: Straw, Yellow or Dark Yellow    Appearance 01/30/2023 CLEAR  CLEAR   Final    Specific gravity 01/30/2023 1.007  1.003 - 1.030   Final    pH (UA) 01/30/2023 6.0  5.0 - 8.0   Final    Protein 01/30/2023 Negative  NEG mg/dL Final    Glucose 01/30/2023 Negative  NEG mg/dL Final    Ketone 01/30/2023 Negative  NEG mg/dL Final    Bilirubin 01/30/2023 Negative  NEG   Final    Blood 01/30/2023 Negative  NEG   Final    Urobilinogen 01/30/2023 0.2  0.2 - 1.0 EU/dL Final    Nitrites 01/30/2023 Negative  NEG   Final    Leukocyte Esterase 01/30/2023 SMALL (A)  NEG   Final    WBC 01/30/2023 5-10  0 - 4 /hpf Final    RBC 01/30/2023 0-5  0 - 5 /hpf Final    Epithelial cells 01/30/2023 FEW  FEW /lpf Final    Epithelial cell category consists of squamous cells and /or transitional urothelial cells.  Renal tubular cells, if present, are separately identified as such. Bacteria 01/30/2023 Negative  NEG /hpf Final    UA:UC IF INDICATED 01/30/2023 CULTURE NOT INDICATED BY UA RESULT  CNI   Final    Special Requests: 01/30/2023 NO SPECIAL REQUESTS    Final    Culture result: 01/30/2023 MRSA NOT PRESENT    Final    Culture result: 01/30/2023     Final                    Value:Screening of patient nares for MRSA is for surveillance purposes and, if positive, to facilitate isolation considerations in high risk settings. It is not intended for automatic decolonization interventions per se as regimens are not sufficiently effective to warrant routine use. Office Visit on 01/26/2023   Component Date Value Ref Range Status    Color 01/26/2023 YELLOW/STRAW    Final    Color Reference Range: Straw, Yellow or Dark Yellow    Appearance 01/26/2023 CLEAR  CLEAR   Final    Specific gravity 01/26/2023 1.013  1.003 - 1.030   Final    pH (UA) 01/26/2023 5.0  5.0 - 8.0   Final    Protein 01/26/2023 Negative  Negative mg/dL Final    Glucose 01/26/2023 Negative  Negative mg/dL Final    Ketone 01/26/2023 Negative  Negative mg/dL Final    Bilirubin 01/26/2023 Negative  Negative   Final    Blood 01/26/2023 Negative  Negative   Final    Urobilinogen 01/26/2023 0.2  0.2 - 1.0 EU/dL Final    Nitrites 01/26/2023 Negative  Negative   Final    Leukocyte Esterase 01/26/2023 MODERATE (A)  Negative   Final    WBC 01/26/2023 5-10  0 - 4 /hpf Final    RBC 01/26/2023 0-5  0 - 5 /hpf Final    Epithelial cells 01/26/2023 FEW  FEW /lpf Final    Epithelial cell category consists of squamous cells and /or transitional urothelial cells. Renal tubular cells, if present, are separately identified as such.     Bacteria 01/26/2023 Negative  Negative /hpf Final    UA:UC IF INDICATED 01/26/2023 CULTURE NOT INDICATED BY UA RESULT  CULTURE NOT INDICATED BY UA RESULT   Final    Sodium 01/26/2023 141  136 - 145 mmol/L Final    Potassium 01/26/2023 3.8  3.5 - 5.1 mmol/L Final Chloride 01/26/2023 105  97 - 108 mmol/L Final    CO2 01/26/2023 30  21 - 32 mmol/L Final    Anion gap 01/26/2023 6  5 - 15 mmol/L Final    Glucose 01/26/2023 77  65 - 100 mg/dL Final    BUN 01/26/2023 12  6 - 20 MG/DL Final    Creatinine 01/26/2023 0.83  0.55 - 1.02 MG/DL Final    BUN/Creatinine ratio 01/26/2023 14  12 - 20   Final    eGFR 01/26/2023 >60  >60 ml/min/1.73m2 Final    Comment:   Pediatric calculator link: [employer]da.at. org/professionals/kdoqi/gfr_calculatorped    These results are not intended for use in patients <25years of age. eGFR results are calculated without a race factor using  the 2021 CKD-EPI equation. Careful clinical correlation is recommended, particularly when comparing to results calculated using previous equations. The CKD-EPI equation is less accurate in patients with extremes of muscle mass, extra-renal metabolism of creatinine, excessive creatine ingestion, or following therapy that affects renal tubular secretion.       Calcium 01/26/2023 10.1  8.5 - 10.1 MG/DL Final    Phosphorus 01/26/2023 3.2  2.6 - 4.7 MG/DL Final    Albumin 01/26/2023 4.0  3.5 - 5.0 g/dL Final    WBC 01/26/2023 5.6  3.6 - 11.0 K/uL Final    RBC 01/26/2023 5.14  3.80 - 5.20 M/uL Final    HGB 01/26/2023 13.3  11.5 - 16.0 g/dL Final    HCT 01/26/2023 40.8  35.0 - 47.0 % Final    MCV 01/26/2023 79.4 (A)  80.0 - 99.0 FL Final    MCH 01/26/2023 25.9 (A)  26.0 - 34.0 PG Final    MCHC 01/26/2023 32.6  30.0 - 36.5 g/dL Final    RDW 01/26/2023 14.7 (A)  11.5 - 14.5 % Final    PLATELET 57/64/6014 717  150 - 400 K/uL Final    MPV 01/26/2023 10.5  8.9 - 12.9 FL Final    NRBC 01/26/2023 0.0  0  WBC Final    ABSOLUTE NRBC 01/26/2023 0.00  0.00 - 0.01 K/uL Final    NEUTROPHILS 01/26/2023 47  32 - 75 % Final    LYMPHOCYTES 01/26/2023 42  12 - 49 % Final    MONOCYTES 01/26/2023 7  5 - 13 % Final    EOSINOPHILS 01/26/2023 3  0 - 7 % Final    BASOPHILS 01/26/2023 1  0 - 1 % Final    IMMATURE GRANULOCYTES 01/26/2023 0  0.0 - 0.5 % Final    ABS. NEUTROPHILS 01/26/2023 2.6  1.8 - 8.0 K/UL Final    ABS. LYMPHOCYTES 01/26/2023 2.3  0.8 - 3.5 K/UL Final    ABS. MONOCYTES 01/26/2023 0.4  0.0 - 1.0 K/UL Final    ABS. EOSINOPHILS 01/26/2023 0.2  0.0 - 0.4 K/UL Final    ABS. BASOPHILS 01/26/2023 0.1  0.0 - 0.1 K/UL Final    ABS. IMM. GRANS. 01/26/2023 0.0  0.00 - 0.04 K/UL Final    DF 01/26/2023 AUTOMATED    Final       Skin:     Denies open wounds, cuts, sores, rashes or other areas of concern in PAT assessment.           Esau Garcia NP  Available via [facility]

## 2023-02-06 ENCOUNTER — ANESTHESIA EVENT (OUTPATIENT)
Dept: SURGERY | Age: 63
End: 2023-02-06
Payer: COMMERCIAL

## 2023-02-07 ENCOUNTER — ANESTHESIA (OUTPATIENT)
Dept: SURGERY | Age: 63
End: 2023-02-07
Payer: COMMERCIAL

## 2023-02-07 ENCOUNTER — HOSPITAL ENCOUNTER (OUTPATIENT)
Age: 63
Setting detail: OBSERVATION
Discharge: HOME HEALTH | End: 2023-02-08
Attending: ORTHOPAEDIC SURGERY | Admitting: ORTHOPAEDIC SURGERY
Payer: COMMERCIAL

## 2023-02-07 DIAGNOSIS — M17.11 PRIMARY OSTEOARTHRITIS OF RIGHT KNEE: Primary | ICD-10-CM

## 2023-02-07 LAB
GLUCOSE BLD STRIP.AUTO-MCNC: 110 MG/DL (ref 65–117)
GLUCOSE BLD STRIP.AUTO-MCNC: 90 MG/DL (ref 65–117)
SERVICE CMNT-IMP: NORMAL
SERVICE CMNT-IMP: NORMAL

## 2023-02-07 PROCEDURE — C1776 JOINT DEVICE (IMPLANTABLE): HCPCS | Performed by: ORTHOPAEDIC SURGERY

## 2023-02-07 PROCEDURE — 76210000016 HC OR PH I REC 1 TO 1.5 HR: Performed by: ORTHOPAEDIC SURGERY

## 2023-02-07 PROCEDURE — 77030039497 HC CST PAD STERILE CHCS -A: Performed by: ORTHOPAEDIC SURGERY

## 2023-02-07 PROCEDURE — 77030000032 HC CUF TRNQT ZIMM -B: Performed by: ORTHOPAEDIC SURGERY

## 2023-02-07 PROCEDURE — 74011000250 HC RX REV CODE- 250: Performed by: ANESTHESIOLOGY

## 2023-02-07 PROCEDURE — P9045 ALBUMIN (HUMAN), 5%, 250 ML: HCPCS | Performed by: ANESTHESIOLOGY

## 2023-02-07 PROCEDURE — 77030031139 HC SUT VCRL2 J&J -A: Performed by: ORTHOPAEDIC SURGERY

## 2023-02-07 PROCEDURE — 2709999900 HC NON-CHARGEABLE SUPPLY: Performed by: ORTHOPAEDIC SURGERY

## 2023-02-07 PROCEDURE — 82962 GLUCOSE BLOOD TEST: CPT

## 2023-02-07 PROCEDURE — 77030020274 HC MISC IMPL ORTHOPEDIC: Performed by: ORTHOPAEDIC SURGERY

## 2023-02-07 PROCEDURE — 77030002933 HC SUT MCRYL J&J -A: Performed by: ORTHOPAEDIC SURGERY

## 2023-02-07 PROCEDURE — 74011250636 HC RX REV CODE- 250/636: Performed by: ANESTHESIOLOGY

## 2023-02-07 PROCEDURE — G0378 HOSPITAL OBSERVATION PER HR: HCPCS

## 2023-02-07 PROCEDURE — 77030003601 HC NDL NRV BLK BBMI -A

## 2023-02-07 PROCEDURE — 97116 GAIT TRAINING THERAPY: CPT

## 2023-02-07 PROCEDURE — 77030040750 HC INSTR NAV SYS DISP ORLN -G: Performed by: ORTHOPAEDIC SURGERY

## 2023-02-07 PROCEDURE — 97161 PT EVAL LOW COMPLEX 20 MIN: CPT

## 2023-02-07 PROCEDURE — 74011000250 HC RX REV CODE- 250: Performed by: PHYSICIAN ASSISTANT

## 2023-02-07 PROCEDURE — 77030010507 HC ADH SKN DERMBND J&J -B: Performed by: ORTHOPAEDIC SURGERY

## 2023-02-07 PROCEDURE — 77030033067 HC SUT PDO STRATFX SPIR J&J -B: Performed by: ORTHOPAEDIC SURGERY

## 2023-02-07 PROCEDURE — 76060000036 HC ANESTHESIA 2.5 TO 3 HR: Performed by: ORTHOPAEDIC SURGERY

## 2023-02-07 PROCEDURE — 77030040922 HC BLNKT HYPOTHRM STRY -A

## 2023-02-07 PROCEDURE — C1713 ANCHOR/SCREW BN/BN,TIS/BN: HCPCS | Performed by: ORTHOPAEDIC SURGERY

## 2023-02-07 PROCEDURE — 77030006822 HC BLD SAW SAG BRSM -B: Performed by: ORTHOPAEDIC SURGERY

## 2023-02-07 PROCEDURE — 77030019905 HC CATH URETH INTMIT MDII -A: Performed by: ORTHOPAEDIC SURGERY

## 2023-02-07 PROCEDURE — 74011000258 HC RX REV CODE- 258: Performed by: ANESTHESIOLOGY

## 2023-02-07 PROCEDURE — 74011250637 HC RX REV CODE- 250/637: Performed by: PHYSICIAN ASSISTANT

## 2023-02-07 PROCEDURE — 76010000172 HC OR TIME 2.5 TO 3 HR INTENSV-TIER 1: Performed by: ORTHOPAEDIC SURGERY

## 2023-02-07 PROCEDURE — 74011250636 HC RX REV CODE- 250/636: Performed by: PHYSICIAN ASSISTANT

## 2023-02-07 PROCEDURE — 97530 THERAPEUTIC ACTIVITIES: CPT

## 2023-02-07 PROCEDURE — 77030006835 HC BLD SAW SAG STRY -B: Performed by: ORTHOPAEDIC SURGERY

## 2023-02-07 DEVICE — DOMED TRI-PEG PATELLA, 32X8MM, E-PLUS
Type: IMPLANTABLE DEVICE | Site: KNEE | Status: FUNCTIONAL
Brand: DJO SURGICAL

## 2023-02-07 DEVICE — DJO EMPOWR KNEETM, FIN BP, NP 7R
Type: IMPLANTABLE DEVICE | Site: KNEE | Status: FUNCTIONAL
Brand: DJO SURGICAL

## 2023-02-07 DEVICE — EMPOWR PS KNEETM FEMUR, NONPOROUS, 8R
Type: IMPLANTABLE DEVICE | Site: KNEE | Status: FUNCTIONAL
Brand: DJO SURGICAL

## 2023-02-07 DEVICE — CEMENT BNE MED VISC 80 GM GENTAMICIN PALACOS MV+G PRO: Type: IMPLANTABLE DEVICE | Site: KNEE | Status: FUNCTIONAL

## 2023-02-07 DEVICE — IMPL CAPPED KNEE K1 TOTAL/HEMI STD CEMENTED DJO: Type: IMPLANTABLE DEVICE | Status: FUNCTIONAL

## 2023-02-07 DEVICE — EMPOWR VVC KNEETM TIBIAL INSERT, SIZE 7, 16MM
Type: IMPLANTABLE DEVICE | Site: KNEE | Status: FUNCTIONAL
Brand: DJO SURGICAL

## 2023-02-07 RX ORDER — SODIUM CHLORIDE 0.9 % (FLUSH) 0.9 %
5-40 SYRINGE (ML) INJECTION AS NEEDED
Status: DISCONTINUED | OUTPATIENT
Start: 2023-02-07 | End: 2023-02-07 | Stop reason: HOSPADM

## 2023-02-07 RX ORDER — POLYETHYLENE GLYCOL 3350 17 G/17G
17 POWDER, FOR SOLUTION ORAL DAILY
Status: DISCONTINUED | OUTPATIENT
Start: 2023-02-08 | End: 2023-02-08 | Stop reason: HOSPADM

## 2023-02-07 RX ORDER — SODIUM CHLORIDE 0.9 % (FLUSH) 0.9 %
5-40 SYRINGE (ML) INJECTION EVERY 8 HOURS
Status: DISCONTINUED | OUTPATIENT
Start: 2023-02-07 | End: 2023-02-08 | Stop reason: HOSPADM

## 2023-02-07 RX ORDER — KETAMINE HYDROCHLORIDE 50 MG/ML
INJECTION INTRAMUSCULAR; INTRAVENOUS AS NEEDED
Status: DISCONTINUED | OUTPATIENT
Start: 2023-02-07 | End: 2023-02-07 | Stop reason: HOSPADM

## 2023-02-07 RX ORDER — OXYCODONE HYDROCHLORIDE 5 MG/1
2.5-5 TABLET ORAL
Qty: 42 TABLET | Refills: 0 | Status: SHIPPED | OUTPATIENT
Start: 2023-02-07 | End: 2023-02-14

## 2023-02-07 RX ORDER — NALOXONE HYDROCHLORIDE 0.4 MG/ML
0.4 INJECTION, SOLUTION INTRAMUSCULAR; INTRAVENOUS; SUBCUTANEOUS AS NEEDED
Status: DISCONTINUED | OUTPATIENT
Start: 2023-02-07 | End: 2023-02-08 | Stop reason: HOSPADM

## 2023-02-07 RX ORDER — SODIUM CHLORIDE 9 MG/ML
INJECTION, SOLUTION INTRAVENOUS
Status: DISCONTINUED | OUTPATIENT
Start: 2023-02-07 | End: 2023-02-07 | Stop reason: HOSPADM

## 2023-02-07 RX ORDER — ALBUMIN HUMAN 50 G/1000ML
SOLUTION INTRAVENOUS AS NEEDED
Status: DISCONTINUED | OUTPATIENT
Start: 2023-02-07 | End: 2023-02-07 | Stop reason: HOSPADM

## 2023-02-07 RX ORDER — SODIUM CHLORIDE, SODIUM LACTATE, POTASSIUM CHLORIDE, CALCIUM CHLORIDE 600; 310; 30; 20 MG/100ML; MG/100ML; MG/100ML; MG/100ML
50 INJECTION, SOLUTION INTRAVENOUS CONTINUOUS
Status: DISCONTINUED | OUTPATIENT
Start: 2023-02-07 | End: 2023-02-07 | Stop reason: HOSPADM

## 2023-02-07 RX ORDER — HYDROMORPHONE HYDROCHLORIDE 1 MG/ML
0.5 INJECTION, SOLUTION INTRAMUSCULAR; INTRAVENOUS; SUBCUTANEOUS
Status: ACTIVE | OUTPATIENT
Start: 2023-02-07 | End: 2023-02-08

## 2023-02-07 RX ORDER — AMOXICILLIN 250 MG
1 CAPSULE ORAL 2 TIMES DAILY
Status: DISCONTINUED | OUTPATIENT
Start: 2023-02-07 | End: 2023-02-08 | Stop reason: HOSPADM

## 2023-02-07 RX ORDER — ACETAMINOPHEN 500 MG
500 TABLET ORAL EVERY 4 HOURS
Qty: 100 TABLET | Refills: 0 | Status: SHIPPED | OUTPATIENT
Start: 2023-02-07

## 2023-02-07 RX ORDER — LIDOCAINE HYDROCHLORIDE 10 MG/ML
0.1 INJECTION, SOLUTION EPIDURAL; INFILTRATION; INTRACAUDAL; PERINEURAL AS NEEDED
Status: DISCONTINUED | OUTPATIENT
Start: 2023-02-07 | End: 2023-02-07 | Stop reason: HOSPADM

## 2023-02-07 RX ORDER — FENTANYL CITRATE 50 UG/ML
50 INJECTION, SOLUTION INTRAMUSCULAR; INTRAVENOUS AS NEEDED
Status: DISCONTINUED | OUTPATIENT
Start: 2023-02-07 | End: 2023-02-07 | Stop reason: HOSPADM

## 2023-02-07 RX ORDER — SODIUM CHLORIDE 0.9 % (FLUSH) 0.9 %
5-40 SYRINGE (ML) INJECTION EVERY 8 HOURS
Status: DISCONTINUED | OUTPATIENT
Start: 2023-02-07 | End: 2023-02-07 | Stop reason: HOSPADM

## 2023-02-07 RX ORDER — PROPOFOL 10 MG/ML
INJECTION, EMULSION INTRAVENOUS AS NEEDED
Status: DISCONTINUED | OUTPATIENT
Start: 2023-02-07 | End: 2023-02-07 | Stop reason: HOSPADM

## 2023-02-07 RX ORDER — ALBUMIN HUMAN 50 G/1000ML
SOLUTION INTRAVENOUS
Status: COMPLETED
Start: 2023-02-07 | End: 2023-02-07

## 2023-02-07 RX ORDER — ACETAMINOPHEN 500 MG
1000 TABLET ORAL ONCE
Status: COMPLETED | OUTPATIENT
Start: 2023-02-07 | End: 2023-02-07

## 2023-02-07 RX ORDER — ROPIVACAINE HYDROCHLORIDE 5 MG/ML
30 INJECTION, SOLUTION EPIDURAL; INFILTRATION; PERINEURAL AS NEEDED
Status: DISCONTINUED | OUTPATIENT
Start: 2023-02-07 | End: 2023-02-07 | Stop reason: HOSPADM

## 2023-02-07 RX ORDER — ONDANSETRON 2 MG/ML
4 INJECTION INTRAMUSCULAR; INTRAVENOUS
Status: ACTIVE | OUTPATIENT
Start: 2023-02-07 | End: 2023-02-08

## 2023-02-07 RX ORDER — MORPHINE SULFATE 2 MG/ML
2 INJECTION, SOLUTION INTRAMUSCULAR; INTRAVENOUS
Status: DISCONTINUED | OUTPATIENT
Start: 2023-02-07 | End: 2023-02-07 | Stop reason: HOSPADM

## 2023-02-07 RX ORDER — ONDANSETRON 2 MG/ML
INJECTION INTRAMUSCULAR; INTRAVENOUS AS NEEDED
Status: DISCONTINUED | OUTPATIENT
Start: 2023-02-07 | End: 2023-02-07 | Stop reason: HOSPADM

## 2023-02-07 RX ORDER — ASPIRIN 81 MG/1
81 TABLET ORAL 2 TIMES DAILY
Qty: 60 TABLET | Refills: 0 | Status: SHIPPED | OUTPATIENT
Start: 2023-02-07

## 2023-02-07 RX ORDER — ROPIVACAINE HYDROCHLORIDE 5 MG/ML
INJECTION, SOLUTION EPIDURAL; INFILTRATION; PERINEURAL
Status: COMPLETED | OUTPATIENT
Start: 2023-02-07 | End: 2023-02-07

## 2023-02-07 RX ORDER — NORETHINDRONE AND ETHINYL ESTRADIOL 0.5-0.035
KIT ORAL AS NEEDED
Status: DISCONTINUED | OUTPATIENT
Start: 2023-02-07 | End: 2023-02-07 | Stop reason: HOSPADM

## 2023-02-07 RX ORDER — AMLODIPINE BESYLATE 5 MG/1
5 TABLET ORAL DAILY
Status: DISCONTINUED | OUTPATIENT
Start: 2023-02-08 | End: 2023-02-08 | Stop reason: HOSPADM

## 2023-02-07 RX ORDER — LIDOCAINE HYDROCHLORIDE 20 MG/ML
INJECTION, SOLUTION EPIDURAL; INFILTRATION; INTRACAUDAL; PERINEURAL AS NEEDED
Status: DISCONTINUED | OUTPATIENT
Start: 2023-02-07 | End: 2023-02-07 | Stop reason: HOSPADM

## 2023-02-07 RX ORDER — SODIUM CHLORIDE 9 MG/ML
125 INJECTION, SOLUTION INTRAVENOUS CONTINUOUS
Status: DISPENSED | OUTPATIENT
Start: 2023-02-07 | End: 2023-02-08

## 2023-02-07 RX ORDER — FACIAL-BODY WIPES
10 EACH TOPICAL DAILY PRN
Status: DISCONTINUED | OUTPATIENT
Start: 2023-02-09 | End: 2023-02-08 | Stop reason: HOSPADM

## 2023-02-07 RX ORDER — BUPIVACAINE HYDROCHLORIDE 5 MG/ML
INJECTION, SOLUTION EPIDURAL; INTRACAUDAL
Status: COMPLETED | OUTPATIENT
Start: 2023-02-07 | End: 2023-02-07

## 2023-02-07 RX ORDER — SODIUM CHLORIDE 0.9 % (FLUSH) 0.9 %
5-40 SYRINGE (ML) INJECTION AS NEEDED
Status: DISCONTINUED | OUTPATIENT
Start: 2023-02-07 | End: 2023-02-08 | Stop reason: HOSPADM

## 2023-02-07 RX ORDER — MIDAZOLAM HYDROCHLORIDE 1 MG/ML
1 INJECTION, SOLUTION INTRAMUSCULAR; INTRAVENOUS AS NEEDED
Status: DISCONTINUED | OUTPATIENT
Start: 2023-02-07 | End: 2023-02-07 | Stop reason: HOSPADM

## 2023-02-07 RX ORDER — CELECOXIB 200 MG/1
200 CAPSULE ORAL ONCE
Status: COMPLETED | OUTPATIENT
Start: 2023-02-07 | End: 2023-02-07

## 2023-02-07 RX ORDER — ONDANSETRON 2 MG/ML
4 INJECTION INTRAMUSCULAR; INTRAVENOUS AS NEEDED
Status: DISCONTINUED | OUTPATIENT
Start: 2023-02-07 | End: 2023-02-07 | Stop reason: HOSPADM

## 2023-02-07 RX ORDER — HYDROMORPHONE HYDROCHLORIDE 1 MG/ML
0.2 INJECTION, SOLUTION INTRAMUSCULAR; INTRAVENOUS; SUBCUTANEOUS
Status: DISCONTINUED | OUTPATIENT
Start: 2023-02-07 | End: 2023-02-07 | Stop reason: HOSPADM

## 2023-02-07 RX ORDER — HYDROXYZINE HYDROCHLORIDE 10 MG/1
10 TABLET, FILM COATED ORAL
Status: DISCONTINUED | OUTPATIENT
Start: 2023-02-07 | End: 2023-02-08 | Stop reason: HOSPADM

## 2023-02-07 RX ORDER — PROPOFOL 10 MG/ML
INJECTION, EMULSION INTRAVENOUS
Status: DISCONTINUED | OUTPATIENT
Start: 2023-02-07 | End: 2023-02-07 | Stop reason: HOSPADM

## 2023-02-07 RX ORDER — OXYCODONE HYDROCHLORIDE 5 MG/1
5 TABLET ORAL
Status: DISCONTINUED | OUTPATIENT
Start: 2023-02-07 | End: 2023-02-08 | Stop reason: HOSPADM

## 2023-02-07 RX ORDER — ASPIRIN 81 MG/1
81 TABLET ORAL 2 TIMES DAILY
Status: DISCONTINUED | OUTPATIENT
Start: 2023-02-07 | End: 2023-02-08 | Stop reason: HOSPADM

## 2023-02-07 RX ORDER — PREGABALIN 75 MG/1
75 CAPSULE ORAL ONCE
Status: COMPLETED | OUTPATIENT
Start: 2023-02-07 | End: 2023-02-07

## 2023-02-07 RX ORDER — PHENYLEPHRINE HCL IN 0.9% NACL 0.4MG/10ML
SYRINGE (ML) INTRAVENOUS
Status: DISCONTINUED | OUTPATIENT
Start: 2023-02-07 | End: 2023-02-07

## 2023-02-07 RX ORDER — OXYCODONE HYDROCHLORIDE 5 MG/1
2.5 TABLET ORAL
Status: DISCONTINUED | OUTPATIENT
Start: 2023-02-07 | End: 2023-02-08 | Stop reason: HOSPADM

## 2023-02-07 RX ORDER — ACETAMINOPHEN 500 MG
500 TABLET ORAL
Status: DISCONTINUED | OUTPATIENT
Start: 2023-02-07 | End: 2023-02-08 | Stop reason: HOSPADM

## 2023-02-07 RX ORDER — AMOXICILLIN 250 MG
1 CAPSULE ORAL
Qty: 60 TABLET | Refills: 0 | Status: SHIPPED | OUTPATIENT
Start: 2023-02-07

## 2023-02-07 RX ORDER — FENTANYL CITRATE 50 UG/ML
25 INJECTION, SOLUTION INTRAMUSCULAR; INTRAVENOUS
Status: DISCONTINUED | OUTPATIENT
Start: 2023-02-07 | End: 2023-02-07 | Stop reason: HOSPADM

## 2023-02-07 RX ORDER — KETOROLAC TROMETHAMINE 30 MG/ML
15 INJECTION, SOLUTION INTRAMUSCULAR; INTRAVENOUS EVERY 6 HOURS
Status: COMPLETED | OUTPATIENT
Start: 2023-02-07 | End: 2023-02-08

## 2023-02-07 RX ADMIN — FENTANYL CITRATE 100 MCG: 50 INJECTION, SOLUTION INTRAMUSCULAR; INTRAVENOUS at 08:44

## 2023-02-07 RX ADMIN — PREGABALIN 75 MG: 75 CAPSULE ORAL at 08:18

## 2023-02-07 RX ADMIN — PHENYLEPHRINE HYDROCHLORIDE 30 MCG/MIN: 10 INJECTION INTRAVENOUS at 08:55

## 2023-02-07 RX ADMIN — BUPIVACAINE HYDROCHLORIDE 10 MG: 5 INJECTION, SOLUTION EPIDURAL; INTRACAUDAL; PERINEURAL at 08:59

## 2023-02-07 RX ADMIN — PROPOFOL 40 MCG/KG/MIN: 10 INJECTION, EMULSION INTRAVENOUS at 08:55

## 2023-02-07 RX ADMIN — CELECOXIB 200 MG: 200 CAPSULE ORAL at 08:18

## 2023-02-07 RX ADMIN — ASPIRIN 81 MG: 81 TABLET, COATED ORAL at 18:19

## 2023-02-07 RX ADMIN — SENNOSIDES AND DOCUSATE SODIUM 1 TABLET: 50; 8.6 TABLET ORAL at 18:19

## 2023-02-07 RX ADMIN — SODIUM CHLORIDE: 900 INJECTION, SOLUTION INTRAVENOUS at 11:22

## 2023-02-07 RX ADMIN — WATER 2 G: 1 INJECTION INTRAMUSCULAR; INTRAVENOUS; SUBCUTANEOUS at 09:08

## 2023-02-07 RX ADMIN — MIDAZOLAM 2 MG: 1 INJECTION INTRAMUSCULAR; INTRAVENOUS at 08:44

## 2023-02-07 RX ADMIN — SODIUM CHLORIDE, PRESERVATIVE FREE 10 ML: 5 INJECTION INTRAVENOUS at 08:00

## 2023-02-07 RX ADMIN — ACETAMINOPHEN 500 MG: 500 TABLET, FILM COATED ORAL at 18:19

## 2023-02-07 RX ADMIN — SODIUM CHLORIDE 125 ML/HR: 9 INJECTION, SOLUTION INTRAVENOUS at 11:30

## 2023-02-07 RX ADMIN — LIDOCAINE HYDROCHLORIDE 40 MG: 20 INJECTION, SOLUTION EPIDURAL; INFILTRATION; INTRACAUDAL; PERINEURAL at 08:55

## 2023-02-07 RX ADMIN — ONDANSETRON HYDROCHLORIDE 4 MG: 2 INJECTION, SOLUTION INTRAMUSCULAR; INTRAVENOUS at 11:02

## 2023-02-07 RX ADMIN — ALBUMIN (HUMAN) 250 ML: 12.5 INJECTION, SOLUTION INTRAVENOUS at 11:36

## 2023-02-07 RX ADMIN — EPHEDRINE SULFATE 5 MG: 50 INJECTION INTRAVENOUS at 10:56

## 2023-02-07 RX ADMIN — KETAMINE HYDROCHLORIDE 20 MG: 50 INJECTION, SOLUTION INTRAMUSCULAR; INTRAVENOUS at 09:13

## 2023-02-07 RX ADMIN — SODIUM CHLORIDE, PRESERVATIVE FREE 10 ML: 5 INJECTION INTRAVENOUS at 13:46

## 2023-02-07 RX ADMIN — EPHEDRINE SULFATE 5 MG: 50 INJECTION INTRAVENOUS at 09:25

## 2023-02-07 RX ADMIN — TRANEXAMIC ACID 1 G: 100 INJECTION, SOLUTION INTRAVENOUS at 09:09

## 2023-02-07 RX ADMIN — ACETAMINOPHEN 500 MG: 500 TABLET, FILM COATED ORAL at 22:04

## 2023-02-07 RX ADMIN — EPHEDRINE SULFATE 5 MG: 50 INJECTION INTRAVENOUS at 10:11

## 2023-02-07 RX ADMIN — EPHEDRINE SULFATE 10 MG: 50 INJECTION INTRAVENOUS at 11:18

## 2023-02-07 RX ADMIN — SODIUM CHLORIDE, POTASSIUM CHLORIDE, SODIUM LACTATE AND CALCIUM CHLORIDE 50 ML/HR: 600; 310; 30; 20 INJECTION, SOLUTION INTRAVENOUS at 08:15

## 2023-02-07 RX ADMIN — ACETAMINOPHEN 1000 MG: 500 TABLET, FILM COATED ORAL at 08:18

## 2023-02-07 RX ADMIN — CEFAZOLIN 2 G: 1 INJECTION, POWDER, FOR SOLUTION INTRAMUSCULAR; INTRAVENOUS at 18:19

## 2023-02-07 RX ADMIN — ROPIVACAINE HYDROCHLORIDE 30 ML: 5 INJECTION, SOLUTION EPIDURAL; INFILTRATION; PERINEURAL at 08:45

## 2023-02-07 RX ADMIN — KETOROLAC TROMETHAMINE 15 MG: 30 INJECTION, SOLUTION INTRAMUSCULAR; INTRAVENOUS at 18:19

## 2023-02-07 RX ADMIN — PROPOFOL 20 MG: 10 INJECTION, EMULSION INTRAVENOUS at 08:55

## 2023-02-07 RX ADMIN — ACETAMINOPHEN 500 MG: 500 TABLET, FILM COATED ORAL at 13:45

## 2023-02-07 NOTE — ANESTHESIA PROCEDURE NOTES
Peripheral Block    Start time: 2/7/2023 8:40 AM  End time: 2/7/2023 8:45 AM  Performed by: Kizzy Del Angel MD  Authorized by: Kizzy Del Angel MD       Pre-procedure: Indications: at surgeon's request and primary anesthetic    Preanesthetic Checklist: patient identified, risks and benefits discussed, site marked, timeout performed, anesthesia consent given, patient being monitored and fire risk safety assessment completed and verbalized    Timeout Time: 08:40 EST      Block Type:   Block Type:   Adductor canal block  Laterality:  Right  Monitoring:  Oxygen, responsive to questions, standard ASA monitoring, continuous pulse ox, frequent vital sign checks and heart rate  Injection Technique:  Single shot  Procedures: ultrasound guided    Patient Position: supine  Prep: chlorhexidine    Location:  Mid thigh  Needle Type:  Stimuplex  Needle Gauge:  20 G  Needle Localization:  Ultrasound guidance  Medication Injected:  Ropivacaine (PF) (NAROPIN)(0.5%) 5 mg/mL injection - Peripheral Nerve Block, Right Leg   30 mL - 2/7/2023 8:45:00 AM  Med Admin Time: 2/7/2023 8:45 AM    Assessment:  Number of attempts:  1  Injection Assessment:  No paresthesia, negative aspiration for CSF, incremental injection every 5 mL, low pressure verified by pressure monitor, no intravascular symptoms, negative aspiration for blood and local visualized surrounding nerve on ultrasound  Patient tolerance:  Patient tolerated the procedure well with no immediate complications

## 2023-02-07 NOTE — ANESTHESIA PREPROCEDURE EVALUATION
Relevant Problems   CARDIOVASCULAR   (+) Hypertension      ENDOCRINE   (+) Arthritis   (+) Arthritis of knee, left       Anesthetic History   No history of anesthetic complications            Review of Systems / Medical History  Patient summary reviewed, nursing notes reviewed and pertinent labs reviewed    Pulmonary  Within defined limits                 Neuro/Psych   Within defined limits           Cardiovascular    Hypertension: well controlled                   GI/Hepatic/Renal  Within defined limits              Endo/Other      Hypothyroidism: well controlled  Obesity and arthritis     Other Findings              Physical Exam    Airway  Mallampati: II  TM Distance: > 6 cm  Neck ROM: normal range of motion   Mouth opening: Normal     Cardiovascular  Regular rate and rhythm,  S1 and S2 normal,  no murmur, click, rub, or gallop             Dental  No notable dental hx       Pulmonary  Breath sounds clear to auscultation               Abdominal  GI exam deferred       Other Findings            Anesthetic Plan    ASA: 3  Anesthesia type: spinal      Post-op pain plan if not by surgeon: peripheral nerve block single    Induction: Intravenous  Anesthetic plan and risks discussed with: Patient

## 2023-02-07 NOTE — DISCHARGE INSTRUCTIONS
Post-op Discharge Instructions Following Total Joint Replacement  Noman Rondon MD  [facility]  [phone]  Follow-up Office Visit  See Dr. Keila Gillespie approximately 3-4 weeks from date of surgery. Call [phone] to make an appointment. Call Kamille Benítez LPN if you have questions or concerns, [phone]. Activity  Use your walker for ambulation. Weight bearing as tolerated unless instructed otherwise by the physical therapist. Get up every hour you are awake and take a brief walk. Lengthen walking distance daily as your strength improves. Continue using your walker until seen in the office for your first follow up visit. Practice your exercises 3 times daily as instructed by the physical therapist. [place] for 20 minutes after exercising. No driving until seen in the office for your first follow up visit. Incision Care  The light brown Aquacel surgical dressing is waterproof and is to remain on your incision for 7 days. On the 7th day, carefully lift the edge of the dressing to break the adhesive seal and gently peel it off. If your Aquacel dressings comes loose or falls off before the 7th day, replace it with a dry sterile gauze dressing and change this dressing daily. Once there is no drainage on the bandage, you mean leave the incision open to air. You may take a shower with the Aquacel dressing in place. After you remove the Aquacel dressing on day 7, you may continue to shower and get your incision wet in the shower. Do not submerge your incision under water in a bathtub, hot tub, swimming pool, etc. until after you have been evaluated at your first office visit. Medications  Blood Clot Prevention: Take medication as prescribed by your physician for 4 weeks postop. Pain Management: Take pain medication as prescribed; wean yourself off of pain medication as your pain lessens. Take with food. You make also take Tylenol every 4-6 hours as needed for pain.   Do not exceed 3 grams (3000mg) per day. Place an ice bag on or around the incision for 20 minutes on / 20 minutes off as needed throughout the day and night, especially after exercising. Stool Softener: You may want to take a stool softener (such as Senokot-S or Colace) to prevent constipation while taking pain medication. If constipation occurs, you may also use a laxative (such as Dulcolax tablets, Miralax, or a suppository). Diet  Resume usual diet at home. Drink plenty of fluids. Eat foods high in fiber and protein. Calcium and Vitamin D supplements recommended. Avoid alcoholic beverages. No smoking. When to call your Orthopaedic Surgeon: If you call after 5pm or on a weekend, the on call physician will return your call  Pain that is not relieved by pain medication, ice, and activity modification  Signs of infection (red incision, continuous drainage from the incision, malodorous drainage, persistent fever greater than 101 degrees Fahrenheit)  Signs of a blood clot in your leg (calf pain, tenderness, redness, and/or swelling of the lower leg)  ?   When to call your Primary Care Physician  Concerns about your medical conditions such as diabetes, high blood pressure, asthma, congestive heart failure  Call if blood sugars are elevated, if you have a persistent headache or dizziness, coughing or congestion, constipation or diarrhea, burning with urination, abnormal heart rate (fast or slow)  When to call 911 and go to the nearest Emergency Room  Acute onset of chest pain, shortness of breath, difficulty breathing

## 2023-02-07 NOTE — BRIEF OP NOTE
Brief Postoperative Note    Patient: Forest Patino  YOB: 1960  MRN: [id number]    Date of Procedure: 2/7/2023     Pre-Op Diagnosis: Primary osteoarthritis of right knee [M17.11]    Post-Op Diagnosis: Same as preoperative diagnosis. Procedure(s):  RIGHT TOTAL KNEE ARTHROPLASTY (SPINAL W/BLOCK W/IV SEDATION)    Surgeon(s):  Gaston Cage MD    Surgical Assistant: Physician Assistant: Unruly Mallory PA-C  Surg Asst-1: David Reynolds    Anesthesia: Spinal     Estimated Blood Loss (mL): 560    Complications: None    Specimens: * No specimens in log *     Implants:   Implant Name Type Inv.  Item Serial No.  Lot No. LRB No. Used Action   CEMENT BNE MED VISC 80 GM GENTAMICIN PALACOS MV+G PRO - SN/A  CEMENT BNE MED VISC 80 GM GENTAMICIN PALACOS MV+G PRO N/A [employer] [id number] Right 1 Implanted   COMPONENT PATELLAR 3 PEG 32X8 MM KNEE DOMED POLYETH E-PLUS - SN/A  COMPONENT PATELLAR 3 PEG 32X8 MM KNEE DOMED POLYETH E-PLUS N/A [employer] SURGICAL_DVS [employer] [id number] Right 1 Implanted   INSERT TIB 7 16 MM KNEE EMPOWR VVC KNEE - SN/A  INSERT TIB 7 16 MM KNEE EMPOWR VVC KNEE N/A [employer] SURGICAL_DVS [employer] [id number] Right 1 Implanted   COMPONENT FEM SZ 8 RT KNEE NP POST STBL EMPOWR - SN/A  COMPONENT FEM SZ 8 RT KNEE NP POST STBL EMPOWR N/A [employer] SURGICAL_DVS [employer] [id number] Right 1 Implanted   BASEPLATE TIB SZ 7 RT KNEE NP STEMMABLE EMPOWR - SN/A  BASEPLATE TIB SZ 7 RT KNEE NP [facility] EMPOWR N/A [facility] SURGICAL_WD [id number] Right 1 Implanted       Drains: * No LDAs found *    Findings: oa right knee    Electronically Signed by Penny Moran PA-C on 2/7/2023 at 10:37 AM

## 2023-02-07 NOTE — PROGRESS NOTES
Ortho NP Note    POD# 0  s/p RIGHT TOTAL KNEE ARTHROPLASTY (SPINAL W/BLOCK W/IV SEDATION)     Pt resting in bed, denies pain at present. Has worked with therapy and continues to deny need for pain medication at this time. Reports she has used oxycodone in the past and is willing to use on PRN basis but denies needing medication at this time. Denies CP, SOB. No dizziness, lightheadedness. Has eaten snack/drinking water--no N/V. Of note, patient reports  is in ER and possibly being admitted to hospital but children still available to assist at discharge. VSS Afebrile. Visit Vitals  /78   Pulse 99   Temp 97.3 °F (36.3 °C)   Resp 16   Ht 5' 7\" (1.702 m)   Wt 95.3 kg (210 lb 1.6 oz)   SpO2 99%   BMI 32.91 kg/m²       Most Recent Labs:   Lab Results   Component Value Date/Time    HGB 13.3 01/26/2023 04:40 PM    Hemoglobin A1c 6.0 (H) 11/14/2022 01:06 PM       Body mass index is 32.91 kg/m². Reference: BMI greater than 30 is classified as obesity and greater than 40 is classified as morbid obesity. Voiding status: + void     Ace wrap + gauze to right leg c.d.i. Cryotherapy in place over incision. Bilateral LEs warm, dry. 1+ DP pulses  Sensation and motor intact. DF/PF/EHL 5/5. Foot Pumps for mechanical DVT proph    Plan:  1) PT: starting today, WBAT  2) Eloina-op Antibiotics Ancef  3) Pain:  Received tylenol, Celebrex, Lyrica in preop. Perioperative anesthesia: Spinal.  Post operative analgesia includes scheduled tylenol, toradol and PRN oxycodone, IV Dilaudid. 4) DVT Prophylaxis:  Aspirin 81 mg PO BID. Encouraged early mobilization, bed exercises, and SCD use. 5) HTN: Routine VS.  Resume home amlodipine. 6) Discharge plans: to home with daughter/son in law's support. DME: has walker.   Rx: [facility] [place] and [facility]        Yandel Branch NP

## 2023-02-07 NOTE — PROGRESS NOTES
TRANSFER - IN REPORT:    Verbal report received from [facility], RN (name) on Erica Espinosa  being received from PACU (unit) for routine post - op      Report consisted of patients Situation, Background, Assessment and   Recommendations(SBAR). Information from the following report(s) SBAR was reviewed with the receiving nurse. Opportunity for questions and clarification was provided. Assessment completed upon patients arrival to unit and care assumed.

## 2023-02-07 NOTE — PROGRESS NOTES
[place] Outpatient Observation Notice (Ghada Verduzco) provided to patient/representative with verbal explanation of the notice. Time allotted for questions regarding the notice. Patient /representative provided a completed copy of the VOON notice. Copy placed on bedside chart.

## 2023-02-07 NOTE — DISCHARGE SUMMARY
[address] SUMMARY     Name: Yulisa Whitney       MR#: [id number]    : 1960  ADMIT DATE: 2023  DISCHARGE DATE: 2023     ADMISSION DIAGNOSIS: Primary osteoarthritis of right knee [M17.11]     DISCHARGE DIAGNOSIS: Primary osteoarthritis of right knee [M17.11]     PROCEDURE PERFORMED: Procedure(s):  RIGHT TOTAL KNEE ARTHROPLASTY (SPINAL W/BLOCK W/IV SEDATION)     CONSULTATIONS:  None. HISTORY OF PRESENT ILLNESS: The patient is a 76-year-old female with progressive right knee pain due to severe valgus osteoarthritis of the right knee. Symptoms have progressed despite comprehensive conservative treatment, and she presents for right total knee replacement. Risks, benefits, and alternatives of the procedure were reviewed with her in detail, and she desires to proceed. HOSPITAL COURSE:  The patient underwent the aforementioned procedure on date of admission under spinal anesthesia. There were no immediate postoperative complications. She was started on a multimodal pain regimen and DVT prophylaxis. DISPOSITION: The patient made slow, steady progress with physical therapy and was appropriate for discharge to Home in stable condition on postoperative day 1. DISCHARGE MEDICATIONS:  Reinitiate preadmission medications. In addition, the patient will be on ASA for DVT prophylaxis and low dose oxycodone and Tylenol for pain. DISCHARGE INSTRUCTIONS:  Detailed printed instructions were provided to the patient. Follow up with Dr. Meredith Bojorquez in approximately 3 weeks. The patient will receive home health physical therapy in the meantime.     Signed by: Tg Butler PA-C  2023

## 2023-02-07 NOTE — ANESTHESIA PROCEDURE NOTES
Spinal Block    Start time: 2/7/2023 8:55 AM  End time: 2/7/2023 9:00 AM  Performed by: Roshni Khan CRNA  Authorized by: Jameel Moore MD     Pre-procedure:   Indications: primary anesthetic  Preanesthetic Checklist: patient identified, risks and benefits discussed, anesthesia consent, site marked, patient being monitored, timeout performed and fire risk safety assessment completed and verbalized    Timeout Time: 08:54 EST      Spinal Block:   Patient Position:  Seated  Prep Region:  Lumbar  Prep: Betadine and patient draped      Location:  L3-4  Technique:  Single shot  Local: bupivacaine (PF) (MARCAINE) 0.5 % (5 mg/mL) intrathecal - Intrathecal   10 mg - 2/7/2023 8:59:00 AM  Local Dose (mL):  2  Med Admin Time: 2/7/2023 8:59 AM    Needle:   Needle Type:  Pencan  Needle Gauge:  24 G  Attempts:  1      Events: CSF confirmed, no blood with aspiration and no paresthesia        Assessment:  Insertion:  Uncomplicated  Patient tolerance:  Patient tolerated the procedure well with no immediate complications

## 2023-02-07 NOTE — PROGRESS NOTES
Bedside and Verbal shift change report given to Ary Mcbride, [facility]  (oncoming nurse) by Erich De Anda RN  (offgoing nurse). Report included the following information SBAR.

## 2023-02-07 NOTE — PROGRESS NOTES
Problem: Falls - Risk of  Goal: *Absence of Falls  Description: Document Chemo Comment Fall Risk and appropriate interventions in the flowsheet.   Outcome: Progressing Towards Goal  Note: Fall Risk Interventions:                                Problem: Patient Education: Go to Patient Education Activity  Goal: Patient/Family Education  Outcome: Progressing Towards Goal

## 2023-02-07 NOTE — PROGRESS NOTES
Primary Nurse Jorge Freeman RN and Zach Pope RN performed a dual skin assessment on this patient Impairment noted- see wound doc flow sheet. Pt has surgical wound on R knee. No other wounds noted.

## 2023-02-08 ENCOUNTER — DOCUMENTATION ONLY (OUTPATIENT)
Dept: ORTHOPEDIC SURGERY | Age: 63
End: 2023-02-08

## 2023-02-08 VITALS
DIASTOLIC BLOOD PRESSURE: 84 MMHG | HEIGHT: 67 IN | WEIGHT: 210.1 LBS | BODY MASS INDEX: 32.98 KG/M2 | RESPIRATION RATE: 18 BRPM | HEART RATE: 80 BPM | OXYGEN SATURATION: 98 % | SYSTOLIC BLOOD PRESSURE: 130 MMHG | TEMPERATURE: 98.2 F

## 2023-02-08 LAB
ANION GAP SERPL CALC-SCNC: 7 MMOL/L (ref 5–15)
BUN SERPL-MCNC: 14 MG/DL (ref 6–20)
BUN/CREAT SERPL: 18 (ref 12–20)
CALCIUM SERPL-MCNC: 8.6 MG/DL (ref 8.5–10.1)
CHLORIDE SERPL-SCNC: 114 MMOL/L (ref 97–108)
CO2 SERPL-SCNC: 23 MMOL/L (ref 21–32)
CREAT SERPL-MCNC: 0.79 MG/DL (ref 0.55–1.02)
GLUCOSE SERPL-MCNC: 110 MG/DL (ref 65–100)
HGB BLD-MCNC: 9.7 G/DL (ref 11.5–16)
POTASSIUM SERPL-SCNC: 3.3 MMOL/L (ref 3.5–5.1)
SODIUM SERPL-SCNC: 144 MMOL/L (ref 136–145)

## 2023-02-08 PROCEDURE — 97530 THERAPEUTIC ACTIVITIES: CPT

## 2023-02-08 PROCEDURE — 74011250636 HC RX REV CODE- 250/636: Performed by: PHYSICIAN ASSISTANT

## 2023-02-08 PROCEDURE — 74011000250 HC RX REV CODE- 250: Performed by: PHYSICIAN ASSISTANT

## 2023-02-08 PROCEDURE — 74011250637 HC RX REV CODE- 250/637: Performed by: PHYSICIAN ASSISTANT

## 2023-02-08 PROCEDURE — 96374 THER/PROPH/DIAG INJ IV PUSH: CPT

## 2023-02-08 PROCEDURE — G0378 HOSPITAL OBSERVATION PER HR: HCPCS

## 2023-02-08 PROCEDURE — 97116 GAIT TRAINING THERAPY: CPT

## 2023-02-08 PROCEDURE — 80048 BASIC METABOLIC PNL TOTAL CA: CPT

## 2023-02-08 PROCEDURE — 36415 COLL VENOUS BLD VENIPUNCTURE: CPT

## 2023-02-08 PROCEDURE — 77030028907 HC WRP KNEE WO BGS SOLM -B

## 2023-02-08 PROCEDURE — 85018 HEMOGLOBIN: CPT

## 2023-02-08 PROCEDURE — 2709999900 HC NON-CHARGEABLE SUPPLY

## 2023-02-08 RX ADMIN — KETOROLAC TROMETHAMINE 15 MG: 30 INJECTION, SOLUTION INTRAMUSCULAR; INTRAVENOUS at 13:08

## 2023-02-08 RX ADMIN — CEFAZOLIN 2 G: 1 INJECTION, POWDER, FOR SOLUTION INTRAMUSCULAR; INTRAVENOUS at 00:35

## 2023-02-08 RX ADMIN — ACETAMINOPHEN 500 MG: 500 TABLET, FILM COATED ORAL at 05:24

## 2023-02-08 RX ADMIN — KETOROLAC TROMETHAMINE 15 MG: 30 INJECTION, SOLUTION INTRAMUSCULAR; INTRAVENOUS at 05:25

## 2023-02-08 RX ADMIN — SENNOSIDES AND DOCUSATE SODIUM 1 TABLET: 50; 8.6 TABLET ORAL at 10:17

## 2023-02-08 RX ADMIN — OXYCODONE HYDROCHLORIDE 2.5 MG: 5 TABLET ORAL at 13:14

## 2023-02-08 RX ADMIN — ACETAMINOPHEN 500 MG: 500 TABLET, FILM COATED ORAL at 10:17

## 2023-02-08 RX ADMIN — ASPIRIN 81 MG: 81 TABLET, COATED ORAL at 18:07

## 2023-02-08 RX ADMIN — KETOROLAC TROMETHAMINE 15 MG: 30 INJECTION, SOLUTION INTRAMUSCULAR; INTRAVENOUS at 00:36

## 2023-02-08 RX ADMIN — ASPIRIN 81 MG: 81 TABLET, COATED ORAL at 10:17

## 2023-02-08 RX ADMIN — POLYETHYLENE GLYCOL 3350 17 G: 17 POWDER, FOR SOLUTION ORAL at 10:17

## 2023-02-08 RX ADMIN — SENNOSIDES AND DOCUSATE SODIUM 1 TABLET: 50; 8.6 TABLET ORAL at 18:08

## 2023-02-08 RX ADMIN — OXYCODONE HYDROCHLORIDE 2.5 MG: 5 TABLET ORAL at 18:07

## 2023-02-08 RX ADMIN — ACETAMINOPHEN 500 MG: 500 TABLET, FILM COATED ORAL at 18:07

## 2023-02-08 RX ADMIN — OXYCODONE HYDROCHLORIDE 2.5 MG: 5 TABLET ORAL at 10:18

## 2023-02-08 NOTE — OP NOTES
[address]   OPERATIVE REPORT    Name:  Ash Muñiz  MR#:  [id number]  :  1960  ACCOUNT #:  [de-identified]  DATE OF SERVICE:  2023    PREOPERATIVE DIAGNOSIS:  Severe osteoarthritis of right knee. POSTOPERATIVE DIAGNOSIS:  Severe osteoarthritis of right knee. PROCEDURE PERFORMED:  Right total knee arthroplasty. SURGEON:  Go Randhawa MD    ASSISTANT:  Kylah Najera PA-C    ANESTHESIA:  Spinal with sedation as well as adductor canal block. COMPLICATIONS:  None. SPECIMENS REMOVED:  None. IMPLANTS:  [employer] Empowr posterior stabilized femur with size 7 tibial tray and 16-mm VVC polyethylene insert and 32-mm patella. ESTIMATED BLOOD LOSS:  200 mL. INDICATIONS:  The patient is a 71-year-old female with progressive right knee pain due to severe valgus osteoarthritis of the right knee. Symptoms have progressed despite comprehensive conservative treatment, and she presents for right total knee replacement. Risks, benefits, and alternatives of the procedure were reviewed with her in detail, and she desires to proceed. PROCEDURE:  Anesthesia team placed an adductor canal block in the right thigh before taking the patient to the operating room where they also placed a spinal.  Preoperative IV antibiotics were administered. Padded pneumatic tourniquet was placed around the right upper thigh. Right lower extremity was prepped and draped in usual sterile fashion. Tourniquet was inflated to 300. Through a midline anterior knee incision, I performed a medial parapatellar arthrotomy. No medial release was performed due to the valgus nature of the arthritis. As soon as the knee was flexed, the tourniquet was released. There were very large hypertrophic osteophytes on the posterior femur which remained initial exposure a little bit difficult.   10-mm distal femoral resection was performed using OrthAlign to navigate a neutral cut relative to the mechanical axis of the femur, and proximal tibial resection was performed using extramedullary alignment guide. Notch osteophytes and somewhat posterior osteophytes on the femur were removed. Piecrust technique was utilized to release tight posterolateral structures with the knee in extension until there was symmetrical extension gap with a 16-mm spacer block. The knee was flexed to 90 degrees and more of the posterior osteophytes were removed from the femur. Gap  was inserted and soft tissue tension was applied. Block was adjusted to produce a 16-mm flexion space. This was excessively externally rotating the femoral component as the lateral flexion gap was opening up significantly. The femur was sized and prepared off of the transepicondylar axis and Lee's line, a size 8. Femoral preparation was completed and remaining osteophytes were removed. Subperiosteal posterior capsular release was performed. Trials were inserted and with the 16-mm trial insert in place, the knee had full extension to gravity. There was satisfactory coronal plane stability and soft tissue tension in extension and mid flexion, but in flexion the lateral space opened up significantly to varus stress. I elected to utilize posterior stabilized femoral component with a semi constrained insert. Box was cut for the femur, and the posterior stabilized trial was placed. A 16 mm trial insert was placed. Patella was prepared for a 32-mm component and tracked centrally using no thumbs technique. Tourniquet was reinflated. Trials were removed and tibial preparation was completed. Bony surfaces were copiously irrigated by pulse lavage and dried before the real components were cemented into place using antibiotic-impregnated cement. Excess cement was removed and the knee was reduced in full extension with the real insert in place during cement setup. Again, VVC insert was utilized.   Periarticular soft tissues were injected with a solution containing 0.5% ropivacaine with epinephrine as well as clonidine and Toradol. Wound was irrigated. Tourniquet was released. Arthrotomy was closed with a combination of heavy Vicryl sutures and a running #2 Stratafix suture. Skin and subcutaneous layers were closed in layered fashion with Vicryl and a running Monocryl subcuticular stitch. Wound was dressed with Dermabond and an Aquacel occlusive dressing as well as sterile compressive dressing. The patient's bladder was decompressed with straight catheterization before she was transported to the postanesthesia care unit in stable condition. All counts were correct at the end of the procedure. The physician assistant was critical throughout the procedure to assist with patient positioning, retraction, and closure.   There were no [employer] residents or fellows available to Zachary West MD      [id number]  D:  02/07/2023 21:14  T:  02/08/2023 2:30  JOB #:  [id number]  CC:  Marixa Novak MD

## 2023-02-08 NOTE — PROGRESS NOTES
Transition of Care: Plan for discharge home with daughter and [facility]. Patient's spouse is in the hospital. [facility] [address] has accepted patient. Patient owns rolling walker. Patient has no stairs to enter, but has 8 stairs up to her bedroom. Family will transport patient home. The Plan for Transition of Care is related to the following treatment goals: home health, prefers [facility] or [facility] [address]    The Patient and/or patient representative  was provided with a choice of provider and agrees   with the discharge plan. [x] Yes [] No    Freedom of choice list was provided with basic dialogue that supports the patient's individualized plan of care/goals, treatment preferences and shares the quality data associated with the providers. [x] Yes [] No    [facility] denied, [facility] accepted.  CM updated AVS.    DAVID Evans/MISAEL

## 2023-02-08 NOTE — PROGRESS NOTES
Problem: Mobility Impaired (Adult and Pediatric)  Goal: *Acute Goals and Plan of Care (Insert Text)  Description: FUNCTIONAL STATUS PRIOR TO ADMISSION: Patient was independent and active without use of DME.    HOME SUPPORT PRIOR TO ADMISSION: The patient lived with  but did not require assist.  On day of surgery -  in ER with CHF - ? Whether hospital admission. Pt's daughter and son-n-law here from [facility] and will be assisting parents. Physical Therapy Goals  Initiated 2/7/2023    1. Patient will move from supine to sit and sit to supine  and scoot up and down in bed with modified independence within 4 days. 2. Patient will perform sit to stand with modified independence within 4 days. 3. Patient will ambulate with modified independence for > 150 feet with the least restrictive device within 4 days. 4. Patient will ascend/descend 4 stairs with one handrail(s) with supervision within 4 days. 5. Patient will perform home exercise program per protocol with supervision/set-up within 4 days. 6. Patient will demonstrate AROM 0-90 degrees in operative joint within 4 days. PHYSICAL THERAPY EVALUATION  Patient: Mathew Gonzalez (25 y.o. female)  Date: 2/7/2023  Primary Diagnosis: Primary osteoarthritis of right knee [M17.11]  Procedure(s) (LRB):  RIGHT TOTAL KNEE ARTHROPLASTY (SPINAL W/BLOCK W/IV SEDATION) (Right) Day of Surgery   Precautions:   WBAT    ASSESSMENT  Based on the objective data described below, the patient presents POD # 0 right TKR with pain right knee, decreased AROM/strength and function right leg, decreased activity tolerance, decline in functional mobility and impaired standing balance/gait with RW. Pt mobilized easily with no c/o dizziness or nausea. Pt s/p Left TKR 2019. Anticipate pt will be able to increase amb distance, advance exercise and perform stairs during am session on 2/8 and be cleared for early discharge from PT standpoint.      Current Level of Function Impacting Discharge (mobility/balance): Standby assist supine to/from sit; CGA for transfers/amb with RW    Functional Outcome Measure: The patient scored 55/100 on the Barthel Index outcome measure. Other factors to consider for discharge: Left TKR 2019, has required DME and family support/assist available      Patient will benefit from skilled therapy intervention to address the above noted impairments. PLAN :  Recommendations and Planned Interventions: bed mobility training, transfer training, gait training, therapeutic exercises, patient and family training/education, and therapeutic activities      Frequency/Duration: Patient will be followed by physical therapy:  twice daily to address goals.     Recommendation for discharge: (in order for the patient to meet his/her long term goals)  Physical therapy at least 2 days/week in the home     This discharge recommendation:  Has been made in collaboration with the attending provider and/or case management    IF patient discharges home will need the following DME: patient owns DME required for discharge         SUBJECTIVE:   Patient stated My  is down in the ER - not sure whether he will be admitted - but my daughter and son-n-law are in town to assist.    OBJECTIVE DATA SUMMARY:   HISTORY:    Past Medical History:   Diagnosis Date    Arthritis     Encounter for Hemoccult screening 01/24/2017    neg    Hot flash, menopausal 2015    Hyperparathyroidism, primary ([address] 02/24/2021    Hypertension 2000    Menopause     Obesity 2000    Pelvic mass in female     Prediabetes 12/29/2017    Preop examination 01/26/2023    Right knee DJD 11/14/2022    Right thyroid nodule 01/17/2018    Atypia of undetermined significance - pt states dr. Logan Waters said no further eval    S/P colonoscopic polypectomy 11/16/2021    Main Cazares MD tubular adenoma,tubulovillious 3 yrs    S/P colonoscopy 11/2015    [place]    S/P TKR (total knee replacement), left 10/07/2019 Deborah Pastor MD     Past Surgical History:   Procedure Laterality Date    COLONOSCOPY N/A 11/16/2021    COLONOSCOPY performed by Alexia Medina MD at [address] ARTHROSCOPY Right 2013    HX KNEE ARTHROSCOPY Left 2015    HX KNEE REPLACEMENT Left 2019    HX TUBAL LIGATION         Personal factors and/or comorbidities impacting plan of care: as above    Home Situation  Home Environment: Private residence  # Steps to Enter: 0  One/Two Story Residence: Two story (plans to stay on first floor)  Living Alone: No  Support Systems: Spouse/Significant Other  Patient Expects to be Discharged to[de-identified] Home with home health  [facility] [place], Walker, rolling, Raised toilet seat    EXAMINATION/PRESENTATION/DECISION MAKING:   Critical Behavior:  Neurologic State: Alert  Orientation Level: Oriented X4  Cognition: Appropriate decision making, Appropriate safety awareness  Safety/Judgement: Awareness of environment, Good awareness of safety precautions  Hearing: Auditory  Auditory Impairment: None  Hearing Aids/Status: Does not own  Skin:  Right leg covered with ace wrap - no drainage noted    Range Of Motion:  AROM: Within functional limits (except right leg)                       Strength:    Strength: Within functional limits (except right leg)                    Tone & Sensation:   Tone: Normal              Sensation: Intact               Coordination:  Coordination: Within functional limits    Functional Mobility:  Bed Mobility:     Supine to Sit: Stand-by assistance  Sit to Supine: Stand-by assistance  Scooting: Stand-by assistance  Transfers:  Sit to Stand: Contact guard assistance  Stand to Sit: Contact guard assistance  Stand Pivot Transfers: Contact guard assistance; Adaptive equipment; Additional time;Assist x1                    Balance:   Sitting: Intact; Without support  Standing: Impaired; With support  Standing - Static: Good;Constant support  Standing - Dynamic : Fair;Constant support  Ambulation/Gait Training:  Distance (ft): 50 Feet (ft)  Assistive Device: Walker, rolling;Gait belt  Ambulation - Level of Assistance: Contact guard assistance; Adaptive equipment; Additional time;Assist x1        Gait Abnormalities: Decreased step clearance  Right Side Weight Bearing: As tolerated  Left Side Weight Bearing: Full  Base of Support: Center of gravity altered;Shift to left  Stance: Right decreased  Speed/Katty: Slow  Step Length: Right shortened;Left shortened  Swing Pattern: Right asymmetrical        Therapeutic Exercises:   Pt instructed and performed ankle pumps and demonstrated proper use of incentive spirometer - to be performed x 10 reps once hr when awake. Pt instructed and performed quad sets, hamstring sets and heel slides - to be performed 3 - 5 reps once hr when awake as tolerated. Written instructions provided on pt's communication board. Functional Measure:  Barthel Index:    Bathin  Bladder: 10  Bowels: 10  Groomin  Dressin  Feeding: 10  Mobility: 0  Stairs: 0  Toilet Use: 5  Transfer (Bed to Chair and Back): 10  Total: 55/100       The Barthel ADL Index: Guidelines  1. The index should be used as a record of what a patient does, not as a record of what a patient could do. 2. The main aim is to establish degree of independence from any help, physical or verbal, however minor and for whatever reason. 3. The need for supervision renders the patient not independent. 4. A patient's performance should be established using the best available evidence. Asking the patient, friends/relatives and nurses are the usual sources, but direct observation and common sense are also important. However direct testing is not needed. 5. Usually the patient's performance over the preceding 24-48 hours is important, but occasionally longer periods will be relevant. 6. Middle categories imply that the patient supplies over 50 per cent of the effort.   7. Use of aids to be independent is allowed. Score Interpretation (from [facility])    Independent   60-79 Minimally independent   40-59 Partially dependent   20-39 Very dependent   <20 Totally dependent     -Teena Reyes., Barthel, D.W. (1965). Functional evaluation: the Barthel Index. [facility] ([address] (1997). The Barthel activities of daily living index: self-reporting versus actual performance in the old (> or = 75 years). Journal [address] 45(7), [facility], J.J.MSiobhanF, Cheryl Adams., Vitor Merritt. (1999). Measuring the change in disability after inpatient rehabilitation; comparison of the responsiveness of the Barthel Index and Functional Foard Measure. Journal of Neurology, Neurosurgery, and Psychiatry, 66(4), 653-797. CHRISTIN Ko, MARISELA Land, & Chencho Whitman, MSiobhanA. (2004) Assessment of post-stroke quality of life in cost-effectiveness studies: The usefulness of the Barthel Index and the EuroQoL-5D.  Quality of Life Research, 15, 294-66           Physical Therapy Evaluation Charge Determination   History Examination Presentation Decision-Making   LOW Complexity : Zero comorbidities / personal factors that will impact the outcome / POC LOW Complexity : 1-2 Standardized tests and measures addressing body structure, function, activity limitation and / or participation in recreation  LOW Complexity : Stable, uncomplicated  LOW Complexity : FOTO score of       Based on the above components, the patient evaluation is determined to be of the following complexity level: LOW     Pain Rating:  Right knee - no pain    Activity Tolerance:   Good - POD# 0 mobilized easily with no complaints    After treatment patient left in no apparent distress:   Supine in bed, Call bell within reach, Side rails x 3, and ice applied to right knee    COMMUNICATION/EDUCATION:   The patients plan of care was discussed with: Registered nurse and NP - Janell Alston . Fall prevention education was provided and the patient/caregiver indicated understanding., Patient/family have participated as able in goal setting and plan of care. , and Patient/family agree to work toward stated goals and plan of care. Pt educated on fall prevention and safety in hospital - Instructed to utilize call button and wait for assistance prior to attempting OOB or mobility.      Thank you for this referral.  Nasir Holly, PT   Time Calculation: 35 mins

## 2023-02-08 NOTE — PROGRESS NOTES
Ortho NP Note    POD# 1  s/p RIGHT TOTAL KNEE ARTHROPLASTY (SPINAL W/BLOCK W/IV SEDATION)   Pt seen with Dr Bella Valdovinos    Pt resting in bed. Completed therapy this morning with reported BP drop and subjective complaint of fatigue. Reports increased pain with activity, just received PO oxycodone for first time in post op period. Overnight primarily using tylenol, toradol. No CP, no SOB. No N/V. Ate breakfast, continuing to drink fluids. + void  VSS Afebrile. Visit Vitals  /86 (BP 1 Location: Right upper arm)   Pulse 91   Temp 98.9 °F (37.2 °C)   Resp 16   Ht 5' 7\" (1.702 m)   Wt 95.3 kg (210 lb 1.6 oz)   SpO2 98%   BMI 32.91 kg/m²       Voiding status: spontaneous void   Output (mL)  Urine Voided: 400 ml (02/07/23 1502)  Unmeasurable Output  Urine Occurrence(s): 2 (02/08/23 0735)      Labs    Lab Results   Component Value Date/Time    HGB 9.7 (L) 02/08/2023 12:43 AM      Lab Results   Component Value Date/Time    INR 1.0 01/30/2023 11:12 AM      Lab Results   Component Value Date/Time    Sodium 144 02/08/2023 12:43 AM    Potassium 3.3 (L) 02/08/2023 12:43 AM    Chloride 114 (H) 02/08/2023 12:43 AM    CO2 23 02/08/2023 12:43 AM    Glucose 110 (H) 02/08/2023 12:43 AM    BUN 14 02/08/2023 12:43 AM    Creatinine 0.79 02/08/2023 12:43 AM    Calcium 8.6 02/08/2023 12:43 AM     Recent Glucose Results:   Lab Results   Component Value Date/Time     (H) 02/08/2023 12:43 AM    GLUCPOC 90 02/07/2023 10:01 PM           Body mass index is 32.91 kg/m². : A BMI > 30 is classified as obesity and > 40 is classified as morbid obesity. Aquacel dressing to right knee c.d.i  Cryotherapy in place over incision  Calves soft and supple; No pain with passive stretch  Bilateral LEs warm, dry. 2+ DP pulses. Sensation and motor intact - PF/DF/EHL intact 5/5  Foot Pumps for mechanical DVT proph while in bed     PLAN:  1) PT: BID WBAT  2) Anticoagulation:  Aspirin 81 mg PO BID for DVT Prophylaxis.   Encouraged early mobilization, bed exercises, and SCD use. 3) Pain - Multimodal approach including cryotherapy, scheduled Tylenol & Toradol with  PRN  oxycodone   4) Hypotension: Hx of HTN, with relative hypotension this morning with PT. Amlodipine held. 500 mL fluid bolus ordered. Continue routine VS.  Reeval with PM therapy. 5) Post operative anemia:  Hgb on 1/26 at PAT: 13.3. Hgb on POD 1: 9.7. EBL: 200 mL. No active bleeding on exam. Expected Acute blood loss post-op anemia, continue to monitor. 6) Post op care: Continue bowel regimen, encouraged IS. Follow up in 3-4 weeks with Dr Lynne Lawson. Aquacel to remain in place x 7 days unless integrity is lost.   7) Readiness for discharge:     [] Vital Signs stable    [] Hgb stable    [] + Voiding    [] Wound intact, drainage minimal    [] Tolerating PO intake     [] Cleared by PT (OT if applicable)     [] Stair training completed (if applicable)    [] Independent / Contact Guard Assist (household distance)     [] Bed mobility     [] Car transfers     [] ADLs    [] Adequate pain control on oral medication alone     Pending PM therapy, VS stability. Goal for home with [place] and daughter's support.       Leeanna Voss NP  Available via [employer]

## 2023-02-08 NOTE — PROGRESS NOTES
Problem: Mobility Impaired (Adult and Pediatric)  Goal: *Acute Goals and Plan of Care (Insert Text)  Description: FUNCTIONAL STATUS PRIOR TO ADMISSION: Patient was independent and active without use of DME.    HOME SUPPORT PRIOR TO ADMISSION: The patient lived with  but did not require assist.  On day of surgery -  in ER with CHF - ? Whether hospital admission. Pt's daughter and son-n-law here from [facility] and will be assisting parents. Physical Therapy Goals  Initiated 2/7/2023    1. Patient will move from supine to sit and sit to supine  and scoot up and down in bed with modified independence within 4 days. 2. Patient will perform sit to stand with modified independence within 4 days. 3. Patient will ambulate with modified independence for > 150 feet with the least restrictive device within 4 days. 4. Patient will ascend/descend 4 stairs with one handrail(s) with supervision within 4 days. 5. Patient will perform home exercise program per protocol with supervision/set-up within 4 days. 6. Patient will demonstrate AROM 0-90 degrees in operative joint within 4 days. 2/8/2023 1040 by Kenya Sheets  Outcome: Progressing Towards Goal   PHYSICAL THERAPY TREATMENT  Patient: Uma Snyder (19 y.o. female)  Date: 2/8/2023  Diagnosis: Primary osteoarthritis of right knee [M17.11] <principal problem not specified>  Procedure(s) (LRB):  RIGHT TOTAL KNEE ARTHROPLASTY (SPINAL W/BLOCK W/IV SEDATION) (Right) 1 Day Post-Op  Precautions: WBAT  Chart, physical therapy assessment, plan of care and goals were reviewed. ASSESSMENT  Patient continues with skilled PT services and is progressing towards goals. She was received sitting up in chair w/ LE's elevated. She reported 5-6/10 pain w/ mobility. Tolerated about 40 ft of gait w/ the RW and CGA. Her gait pattern was very antalgic and \"choppy\" as she demonstrated step to gait pattern. Pt appeared somewhat \"out of it\" and fatigued but initially denied dizziness and lightheaded feeling. Pt did request to sit once she arrived to practiced steps, did not verbalize any orthostatic symptoms when asked. Pt's BP taken while seated 109/72mmHg and taken again in standing (89/60 mmHg). Pt returned to sitting in w/c and transported back to bedside. During transport back to room is when pt reports feeling lightheaded. Pt safely transferred back to bed w/ CGA and Min/Mod A for BLE's into bed. RN and NP made aware. Will follow up this afternoon for continued mobility progression/ training as appropriate. Pt may require 2-3 more sessions to meet therapy goals prior to d/c home w/ HHPT pending BP stability. Vitals:    02/08/23 0954 02/08/23 0956 02/08/23 1000 02/08/23 1009   BP: 109/72 (!) 89/60 (!) 93/54 112/69   BP 1 Location: Left upper arm  Left upper arm Right upper arm   BP Patient Position: Sitting  Comment: post amb Standing Sitting; Other (Comment)  Comment: Long sit in bed At rest;Semi fowlers;Supine; Other (Comment)  Comment: post-activity   Pulse: 80 87 83 78   Temp:       Resp:       Height:       Weight:       SpO2:            Current Level of Function Impacting Discharge (mobility/balance): CGA for sit<>stand from chair and to EOB; Mod A for BLE's into bed. Other factors to consider for discharge: Mobility below baseline. pt's  currently admitted here at [facility] (4th floor). Pt to have daughter and FARHAD assist at d/c. PLAN :  Patient continues to benefit from skilled intervention to address the above impairments. Continue treatment per established plan of care. to address goals.     Recommendation for discharge: (in order for the patient to meet his/her long term goals)  Physical therapy at least 2 days/week in the home     This discharge recommendation:  Has been made in collaboration with the attending provider and/or case management    IF patient discharges home will need the following DME: patient owns DME required for discharge SUBJECTIVE:   Patient stated I know I have to keep moving if I want it to get better. Sarahi Salinas    OBJECTIVE DATA SUMMARY:   Critical Behavior:  Neurologic State: Alert  Orientation Level: Oriented X4  Cognition: Appropriate decision making, Appropriate safety awareness  Safety/Judgement: Awareness of environment, Good awareness of safety precautions    Range of Motion:      Generally decreased, functional                       Functional Mobility Training:  Bed Mobility:     Supine to Sit:  (received OOB in chair)  Sit to Supine: Moderate assistance;Assist x1 (LE's into bed)           Transfers:  Sit to Stand: Contact guard assistance  Stand to Sit: Contact guard assistance                             Balance:  Sitting: Intact  Standing: Impaired; With support  Standing - Static: Constant support;Good  Standing - Dynamic : Constant support; Fair  Ambulation/Gait Training:  Distance (ft): 40 Feet (ft)  Assistive Device: Gait belt;Walker, rolling  Ambulation - Level of Assistance: Contact guard assistance;Assist x1        Gait Abnormalities: Decreased step clearance; Step to gait; Ataxic  Right Side Weight Bearing: As tolerated  Left Side Weight Bearing: Full  Base of Support: Shift to left; Widened;Center of gravity altered  Stance: Right decreased  Speed/Katty: Pace decreased (<100 feet/min); Slow  Step Length: Right shortened;Left shortened  Swing Pattern: Right asymmetrical                 Stairs:     Stairs - Level of Assistance:  (amb to steps however pt requesting to sit. . pt appeared fatigued butdenied dizziness.  BP soft in sitting, then decreased to 89/60mmHg w/ standing.)        Pain Ratin-6/10    Activity Tolerance:   Fair, requires rest breaks, and signs and symptoms of orthostatic hypotension    After treatment patient left in no apparent distress:   Supine in bed, Call bell within reach, and Side rails x 3    COMMUNICATION/COLLABORATION:   The patients plan of care was discussed with: Registered nurse and MARISEL FAROOQ Means,PTA   Time Calculation: 29 mins

## 2023-02-08 NOTE — PROGRESS NOTES
Spiritual Care Assessment/Progress Note  [facility]      NAME: Sammy Menchaca      MRN: [id number]  AGE: 65 y.o.  SEX: female  Protestant Affiliation: No Caodaism   Language: English     2/8/2023     Total Time (in minutes): 5     Spiritual Assessment begun in [facility] through conversation with:         [x]Patient        [] Family    [] Friend(s)        Reason for Consult: Interdisciplinary rounds, patient floor, Initial/Spiritual assessment, patient floor     Spiritual beliefs: (Please include comment if needed)     [] Identifies with a sha tradition:         [] Supported by a sha community:            [] Claims no spiritual orientation:           [] Seeking spiritual identity:                [] Adheres to an individual form of spirituality:           [x] Not able to assess:                           Identified resources for coping:      [x] Prayer                               [] Music                  [] Guided Imagery     [x] Family/friends                 [] Pet visits     [] Devotional reading                         [] Unknown     [] Other:                                               Interventions offered during this visit: (See comments for more details)    Patient Interventions: Interdisciplinary rounds, Initial/Spiritual assessment, patient floor           Plan of Care:     [x] Support spiritual and/or cultural needs    [] Support AMD and/or advance care planning process      [] Support grieving process   [] Coordinate Rites and/or Rituals    [] Coordination with community clergy   [] No spiritual needs identified at this time   [] Detailed Plan of Care below (See Comments)  [] Make referral to Music Therapy  [] Make referral to Pet Therapy     [] Make referral to Addiction services  [] Make referral to [facility]  [] Make referral to Spiritual Care Partner  [] No future visits requested        [x] Contact Spiritual Care for further referrals     Comments: Interdisciplinary rounds initial visit in [facility] [address] [facility]. Patient was present during the visit. Patient shared about supportive family and hopes of discharge. Provided encouragement and prayer. Advised of  availability. Advised nurse to contact [facility] for any further referrals. Jadyn Sánchez MS.  Intern.

## 2023-02-08 NOTE — PROGRESS NOTES
Problem: Mobility Impaired (Adult and Pediatric)  Goal: *Acute Goals and Plan of Care (Insert Text)  Description: FUNCTIONAL STATUS PRIOR TO ADMISSION: Patient was independent and active without use of DME.    HOME SUPPORT PRIOR TO ADMISSION: The patient lived with  but did not require assist.  On day of surgery -  in ER with CHF - ? Whether hospital admission. Pt's daughter and son-n-law here from [facility] and will be assisting parents. Physical Therapy Goals  Initiated 2/7/2023    1. Patient will move from supine to sit and sit to supine  and scoot up and down in bed with modified independence within 4 days. 2. Patient will perform sit to stand with modified independence within 4 days. 3. Patient will ambulate with modified independence for > 150 feet with the least restrictive device within 4 days. 4. Patient will ascend/descend 4 stairs with one handrail(s) with supervision within 4 days. 5. Patient will perform home exercise program per protocol with supervision/set-up within 4 days. 6. Patient will demonstrate AROM 0-90 degrees in operative joint within 4 days. 2/8/2023 1503 by Kenya Sheets  Outcome: Progressing Towards Goal   PHYSICAL THERAPY NOTE  Patient: Donovan Newton (22 y.o. female)  Date: 2/8/2023  Primary Diagnosis: Primary osteoarthritis of right knee [M17.11]  Procedure(s) (LRB):  RIGHT TOTAL KNEE ARTHROPLASTY (SPINAL W/BLOCK W/IV SEDATION) (Right) 1 Day Post-Op   Precautions: Fall,  WBAT    Donovan Newton was seen for afternoon PT session. She is walking 40 feet with the RW (CGA). Noted gait pattern improved this afternoon yet still amb w/ step to pattern w/ occasional step through pattern noted. Her BP improved this session (before and after activity; see VS below); no c/o orthostatic symptoms. Patient was instructed in stair management and able to negotiate 4 steps using Right rail and SPC (CGA). Reviewed activity recommendations and restrictions with patient. Tamara Dickerson is cleared for discharge home w/ HHPT from a mobility standpoint. Ortho NP and RN aware. Vitals:    02/08/23 1439 02/08/23 1450   BP: 123/70 130/84   BP 1 Location: Right upper arm Right upper arm   BP Patient Position: Standing Other (Comment)  Comment: Long sitting in bed. Pulse: 89 80   Temp:     Resp:     Height:     Weight:     SpO2:           Afternoon session functional mobility:  Bed Mobility:     Supine to Sit:  (received OOB in chair)  Sit to Supine: Minimum assistance;Assist x1 (RLE into bed)     Transfers:  Sit to Stand: Contact guard assistance  Stand to Sit: Contact guard assistance                       Balance:   Sitting: Intact  Standing: Intact; With support  Standing - Static: Constant support;Good  Standing - Dynamic : Constant support;Good  Ambulation/Gait Training:  Distance (ft): 40 Feet (ft)  Assistive Device: Gait belt;Walker, rolling  Ambulation - Level of Assistance: Contact guard assistance        Gait Abnormalities: Decreased step clearance; Step to gait; Ataxic  Right Side Weight Bearing: As tolerated  Left Side Weight Bearing: Full  Base of Support: Shift to left; Widened;Center of gravity altered  Stance: Right decreased  Speed/Katty: Pace decreased (<100 feet/min); Slow  Step Length: Right shortened;Left shortened  Swing Pattern: Right asymmetrical     Interventions: Safety awareness training        Stairs:  Number of Stairs Trained: 4  Stairs - Level of Assistance: Contact guard assistance; Adaptive equipment;Assist X1  Rail Use: Right     Thank you,  Kenya FAROOQ Means,PTA   Time Calculation: 17 mins

## 2023-02-15 NOTE — ANESTHESIA POSTPROCEDURE EVALUATION
Post-Anesthesia Evaluation and Assessment    Patient: Shonna Mantilla MRN: [id number]  SSN: [id number]    YOB: 1960  Age: 65 y.o. Sex: female      I have evaluated the patient and they are stable and ready for discharge from the PACU. Cardiovascular Function/Vital Signs  Visit Vitals  /84 (BP 1 Location: Right upper arm, BP Patient Position: Other (Comment))   Pulse 80   Temp 36.8 °C (98.2 °F)   Resp 18   Ht 5' 7\" (1.702 m)   Wt 95.3 kg (210 lb 1.6 oz)   SpO2 98%   BMI 32.91 kg/m²       Patient is status post Spinal anesthesia for Procedure(s):  RIGHT TOTAL KNEE ARTHROPLASTY (SPINAL W/BLOCK W/IV SEDATION). Nausea/Vomiting: None    Postoperative hydration reviewed and adequate. Pain:  Pain Scale 1: Numeric (0 - 10) (02/08/23 1315)  Pain Intensity 1: 5 (02/08/23 1315)   Managed    Neurological Status:   Neuro (WDL): Exceptions to WDL (02/07/23 1135)  Neuro  Neurologic State: Alert (02/08/23 1022)  Orientation Level: Oriented X4 (02/08/23 1022)  Cognition: Appropriate decision making; Appropriate for age attention/concentration;Decreased attention/concentration; Follows commands (02/08/23 1022)  LUE Motor Response: Purposeful (02/08/23 1022)  LLE Motor Response: Purposeful (02/08/23 1022)  RUE Motor Response: Purposeful (02/08/23 1022)  RLE Motor Response: Purposeful;Weak (02/08/23 1022)   At baseline    Mental Status, Level of Consciousness: Alert and  oriented to person, place, and time    Pulmonary Status:   O2 Device: None (Room air) (02/08/23 1022)   Adequate oxygenation and airway patent    Complications related to anesthesia: None    Post-anesthesia assessment completed. No concerns    Signed By: Maranda Otero MD     February 15, 2023              Procedure(s):  RIGHT TOTAL KNEE ARTHROPLASTY (SPINAL W/BLOCK W/IV SEDATION).     spinal    <BSHSIANPOST>    INITIAL Post-op Vital signs:   Vitals Value Taken Time   /67 02/07/23 1150   Temp 36.8 °C (98.3 °F) 02/07/23 1135   Pulse 74 02/07/23 1150   Resp 15 02/07/23 1150   SpO2 99 % 02/07/23 1150

## 2023-02-25 PROBLEM — Z01.818 PREOP EXAMINATION: Status: RESOLVED | Noted: 2023-01-26 | Resolved: 2023-02-25

## 2023-03-02 ENCOUNTER — OFFICE VISIT (OUTPATIENT)
Dept: ORTHOPEDIC SURGERY | Age: 63
End: 2023-03-02
Payer: COMMERCIAL

## 2023-03-02 VITALS — BODY MASS INDEX: 34.53 KG/M2 | HEIGHT: 67 IN | WEIGHT: 220 LBS

## 2023-03-02 DIAGNOSIS — Z09 SURGERY FOLLOW-UP: ICD-10-CM

## 2023-03-02 DIAGNOSIS — Z96.651 STATUS POST TOTAL KNEE REPLACEMENT, RIGHT: Primary | ICD-10-CM

## 2023-03-02 PROCEDURE — 99024 POSTOP FOLLOW-UP VISIT: CPT | Performed by: PHYSICIAN ASSISTANT

## 2023-03-02 NOTE — LETTER
3/2/2023    Patient: Mikey Johnson   YOB: 1960   Date of Visit: 3/2/2023     Yamilex Ghosh MD  [facility]  [address] [facility] [address]  [address] [address], [facility]    Dear Yamilex Ghosh MD,      Thank you for referring Ms. Mikey Johnson to [facility] for evaluation. My notes for this consultation are attached. If you have questions, please do not hesitate to call me. I look forward to following your patient along with you.       Sincerely,    Sadie Mcguire PA-C

## 2023-03-02 NOTE — PROGRESS NOTES
José Luis Chen (: 1960) is a 65 y.o. female, patient, here for evaluation of the following chief complaint(s):  Surgical Follow-up (PO #1: RTK: 23)       SUBJECTIVE/OBJECTIVE:  José Luis Chen presents today for first postop visit status post right total knee arthroplasty on 23. She is finished with HHPT. States she got flexion to 90 with therapy. Denies pain, not taking anything- not even Tylenol. Denies taking blood thinner as prescribed. PHYSICAL EXAM:  Vitals: Ht 5' 7\" (1.702 m)   Wt 220 lb (99.8 kg)   BMI 34.46 kg/m²   Body mass index is 34.46 kg/m². 65y.o. year old F in no acute distress. Ambulates with a slight limp at start of, gait quickly normalizes. Neutral alignment of the right knee. Well approximated, well-healing anterior incision, some eschar remain. No marginal erythema, warmth, drainage. Range of motion lacks 1 to 2 degrees of full extension, flexion to 80/85, subjectively 90 with therapy. Preop 0-90. Motor 5/5. No distal edema. IMAGING:  Radiographs: XR Results (most recent):  Results from Appointment encounter on 22    XR KNEE RT MIN 4 V    Narrative  Four views (AP, PA-flex, lateral & sunrise) of the right knee were taken and reviewed today using digital radiography which reveal complete loss of lateral joint space with large marginal osteophytes, lateral joint erosion severe patellofemoral osteoarthritis. Incidental finding left total knee components, satisfactory position and alignment. ASSESSMENT/PLAN:  1. Status post total knee replacement, right  -     XR KNEE RT 3 V; Future  -     REFERRAL TO PHYSICAL THERAPY  2. Surgery follow-up    First postop visit status post right total knee arthroplasty on 2023. She was encouraged to transition to outpatient physical therapy, she is not interested at this time due to personal reasons. Therapy prescription provided anyway in case she has time.   Would recommend Tylenol if she has any pain, she is not currently taking anything. Follow-up in 4 weeks for clinical recheck. When she is ready to return to work, if it is before her next visit, she will call and we can fill out her paperwork accordingly. Return in about 4 weeks (around 3/30/2023) for POV #2 with Dr. Vic aHrp. Review Of Systems  ROS    Positive for: Skin, Musculoskeletal  Last edited by Jya Castaneda on 3/2/2023  3:32 PM.         Patient denies any recent fever, chills, nausea, vomiting, chest pain, or shortness of breath. No Known Allergies    Current Outpatient Medications   Medication Sig    amLODIPine (NORVASC) 5 mg tablet Take 1 Tablet by mouth daily. hydroCHLOROthiazide (HYDRODIURIL) 12.5 mg tablet Take 1 Tablet by mouth daily. nystatin-triamcinolone (MYCOLOG) 100,000-0.1 unit/gram-% ointment Apply  to affected area two (2) times a day. aspirin delayed-release 81 mg tablet Take 1 Tablet by mouth two (2) times a day. Indications: blood clot prevention (Patient not taking: Reported on 3/2/2023)    senna-docusate (PERICOLACE) 8.6-50 mg per tablet Take 1 Tablet by mouth two (2) times daily as needed for Constipation. (Patient not taking: Reported on 3/2/2023)    acetaminophen (TYLENOL) 500 mg tablet Take 1 Tablet by mouth every four (4) hours. (Patient not taking: Reported on 3/2/2023)     No current facility-administered medications for this visit.        Past Medical History:   Diagnosis Date    Arthritis     Encounter for Hemoccult screening 01/24/2017    neg    Hot flash, menopausal 2015    Hyperparathyroidism, primary ([facility] [address] 02/24/2021    Hypertension 2000    Menopause     Obesity 2000    Pelvic mass in female     Prediabetes 12/29/2017    Preop examination 01/26/2023    Right knee DJD 11/14/2022    Right thyroid nodule 01/17/2018    Atypia of undetermined significance - pt states dr. Neetu Ayala said no further eval    S/P colonoscopic polypectomy 11/16/2021    Radha Sosa MD tubular adenoma,tubulovillious 3 yrs    S/P colonoscopy 2015    [place], [place]    S/P TKR (total knee replacement), left 10/07/2019    Safia Zuniga MD        Past Surgical History:   Procedure Laterality Date    COLONOSCOPY N/A 2021    COLONOSCOPY performed by Cate Saab MD at [facility]    HX KNEE ARTHROSCOPY Right     HX KNEE ARTHROSCOPY Left     HX KNEE REPLACEMENT Left     HX KNEE REPLACEMENT Right 2023    HX TUBAL LIGATION         Family History   Problem Relation Age of Onset    Hypertension Mother     Diabetes Mother     No Known Problems Father     Diabetes Sister     Hypertension Sister     No Known Problems Brother     Breast Cancer Maternal Aunt     Hypertension Sister     Other Sister         SPINA BIFIDA    No Known Problems Brother     Anesth Problems Neg Hx         Social History     Socioeconomic History    Marital status:      Spouse name: Not on file    Number of children: Not on file    Years of education: Not on file    Highest education level: Not on file   Occupational History    Not on file   Tobacco Use    Smoking status: Never    Smokeless tobacco: Never   Vaping Use    Vaping Use: Never used   Substance and Sexual Activity    Alcohol use: Not Currently    Drug use: Never    Sexual activity: Yes     Partners: Male   Other Topics Concern    Not on file   Social History Narrative    Habits: There is no history of smoking, alcohol abuse or drug abuse. Social History:  The patient is  and lives with her . The patient is the mother of 29 25and 39year-old children and three grandchildren. The patient is gainfully employed as a CNA at [employer] She completed high school. Her Anabaptist background is Buddhist.          Family History:  Father  at 79 of a sickness. Mother  of complications of alcoholism. She has eight siblings.      Social Determinants of Health     Financial Resource Strain: Not on file Food Insecurity: Not on file   Transportation Needs: Not on file   Physical Activity: Not on file   Stress: Not on file   Social Connections: Not on file   Intimate Partner Violence: Not on file   Housing Stability: Not on file         Orders Placed This Encounter    XR KNEE RT 3 V     Standing Status:   Future     Number of Occurrences:   1     Standing Expiration Date:   3/2/2024    REFERRAL TO PHYSICAL THERAPY     Referral Priority:   Routine     Referral Type:   PT/OT/ST     Referral Reason:   Specialty Services Required     Number of Visits Requested:   [address] Rebeka Bridges M.D. was available for immediate consultation as the supervising physician. An electronic signature was used to authenticate this note.   -- Trice éMndez PA-C

## 2023-03-20 ENCOUNTER — DOCUMENTATION ONLY (OUTPATIENT)
Dept: ORTHOPEDIC SURGERY | Age: 63
End: 2023-03-20

## 2023-03-30 ENCOUNTER — OFFICE VISIT (OUTPATIENT)
Dept: ORTHOPEDIC SURGERY | Age: 63
End: 2023-03-30
Payer: COMMERCIAL

## 2023-03-30 VITALS — BODY MASS INDEX: 34.53 KG/M2 | WEIGHT: 220 LBS | HEIGHT: 67 IN

## 2023-03-30 DIAGNOSIS — Z09 SURGERY FOLLOW-UP: ICD-10-CM

## 2023-03-30 DIAGNOSIS — Z96.651 STATUS POST TOTAL KNEE REPLACEMENT, RIGHT: Primary | ICD-10-CM

## 2023-03-30 PROCEDURE — 99024 POSTOP FOLLOW-UP VISIT: CPT | Performed by: PHYSICIAN ASSISTANT

## 2023-03-30 NOTE — PROGRESS NOTES
Cortney Goldman (: 1960) is a 65 y.o. female, patient, here for evaluation of the following chief complaint(s):  Surgical Follow-up (PO #2: RTK: 23)       SUBJECTIVE/OBJECTIVE:  Cortney Goldman presents today for routine follow-up 7 weeks out from right total knee replacement. She is very happy with her progress. Has no pain. She is back to work. She is doing home exercise program on her own. PHYSICAL EXAM:  Vitals: Ht 5' 7\" (1.702 m)   Wt 220 lb (99.8 kg)   BMI 34.46 kg/m²   Body mass index is 34.46 kg/m². 65y.o. year old F, no distress. Ambulates without a limp. Right anterior knee incision is well-healed. Trace residual local swelling. Full active extension with flexion to 90 degrees. Preoperative range of motion was 0-90. No distal edema. No calf tenderness. IMAGING:  Radiographs: No images today. ASSESSMENT/PLAN:  1. Status post total knee replacement, right  2. Surgery follow-up    Satisfactory progress. Continue home exercise program and low-impact activities as tolerated. Return in 10 months for routine 1 year postop x-ray right knee, sooner if needed. No follow-ups on file. Review Of Systems  ROS    Positive for: Musculoskeletal  Last edited by Carlo Mckee on 3/30/2023  3:26 PM.         Patient denies any recent fever, chills, nausea, vomiting, chest pain, or shortness of breath. No Known Allergies    Current Outpatient Medications   Medication Sig    amLODIPine (NORVASC) 5 mg tablet Take 1 Tablet by mouth daily. hydroCHLOROthiazide (HYDRODIURIL) 12.5 mg tablet Take 1 Tablet by mouth daily. nystatin-triamcinolone (MYCOLOG) 100,000-0.1 unit/gram-% ointment Apply  to affected area two (2) times a day. aspirin delayed-release 81 mg tablet Take 1 Tablet by mouth two (2) times a day.  Indications: blood clot prevention (Patient not taking: No sig reported)    senna-docusate (PERICOLACE) 8.6-50 mg per tablet Take 1 Tablet by mouth two (2) times daily as needed for Constipation. (Patient not taking: No sig reported)    acetaminophen (TYLENOL) 500 mg tablet Take 1 Tablet by mouth every four (4) hours. (Patient not taking: No sig reported)     No current facility-administered medications for this visit.        Past Medical History:   Diagnosis Date    Arthritis     Encounter for Hemoccult screening 01/24/2017    neg    Hot flash, menopausal 2015    Hyperparathyroidism, primary ([address] 02/24/2021    Hypertension 2000    Menopause     Obesity 2000    Pelvic mass in female     Prediabetes 12/29/2017    Preop examination 01/26/2023    Right knee DJD 11/14/2022    Right thyroid nodule 01/17/2018    Atypia of undetermined significance - pt states dr. Jenae Thakur said no further eval    S/P colonoscopic polypectomy 11/16/2021    Marika Fine MD tubular adenoma,tubulovillious 3 yrs    S/P colonoscopy 11/2015    [place], [place]    S/P TKR (total knee replacement), left 10/07/2019    Edis Ozuna MD        Past Surgical History:   Procedure Laterality Date    COLONOSCOPY N/A 11/16/2021    COLONOSCOPY performed by Marika Fine MD at [place]    HX KNEE ARTHROSCOPY Right 2013    HX KNEE ARTHROSCOPY Left 2015    HX KNEE REPLACEMENT Left 2019    HX KNEE REPLACEMENT Right 02/07/2023    HX TUBAL LIGATION         Family History   Problem Relation Age of Onset    Hypertension Mother     Diabetes Mother     No Known Problems Father     Diabetes Sister     Hypertension Sister     No Known Problems Brother     Breast Cancer Maternal Aunt     Hypertension Sister     Other Sister         SPINA BIFIDA    No Known Problems Brother     Anesth Problems Neg Hx         Social History     Socioeconomic History    Marital status:      Spouse name: Not on file    Number of children: Not on file    Years of education: Not on file    Highest education level: Not on file   Occupational History    Not on file   Tobacco Use    Smoking status: Never    Smokeless tobacco: Never   Vaping Use    Vaping Use: Never used   Substance and Sexual Activity    Alcohol use: Not Currently    Drug use: Never    Sexual activity: Yes     Partners: Male   Other Topics Concern    Not on file   Social History Narrative    Habits: There is no history of smoking, alcohol abuse or drug abuse. Social History:  The patient is  and lives with her . The patient is the mother of 29 25and 39year-old children and three grandchildren. The patient is gainfully employed as a CNA at [employer] She completed high school. Her [employer] background is [employer].          Family History:  Father  at 79 of a sickness. Mother  of complications of alcoholism. She has eight siblings. Social Determinants of Health     Financial Resource Strain: Not on file   Food Insecurity: Not on file   Transportation Needs: Not on file   Physical Activity: Not on file   Stress: Not on file   Social Connections: Not on file   Intimate Partner Violence: Not on file   Housing Stability: Not on file       No orders of the defined types were placed in this encounter. An electronic signature was used to authenticate this note.   -- Rudy Murray MD

## 2023-03-30 NOTE — LETTER
3/30/2023    Patient: Lois Wakefield   YOB: 1960   Date of Visit: 3/30/2023     Mira Ramesh MD  [facility]  [address] [facility] [address]  [address]    Dear Mira Ramesh MD,      Thank you for referring Ms. Lois Wakefield to [facility] for evaluation. My notes for this consultation are attached. If you have questions, please do not hesitate to call me. I look forward to following your patient along with you.       Sincerely,    Leesa Cruz PA-C

## 2023-05-08 ENCOUNTER — OFFICE VISIT (OUTPATIENT)
Facility: CLINIC | Age: 63
End: 2023-05-08
Payer: COMMERCIAL

## 2023-05-08 VITALS
WEIGHT: 169.3 LBS | HEART RATE: 84 BPM | BODY MASS INDEX: 26.57 KG/M2 | OXYGEN SATURATION: 99 % | SYSTOLIC BLOOD PRESSURE: 96 MMHG | HEIGHT: 67 IN | TEMPERATURE: 98 F | DIASTOLIC BLOOD PRESSURE: 68 MMHG | RESPIRATION RATE: 18 BRPM

## 2023-05-08 DIAGNOSIS — I10 PRIMARY HYPERTENSION: ICD-10-CM

## 2023-05-08 DIAGNOSIS — E66.9 OBESITY (BMI 30.0-34.9): ICD-10-CM

## 2023-05-08 DIAGNOSIS — M17.11 PRIMARY OSTEOARTHRITIS OF RIGHT KNEE: Primary | ICD-10-CM

## 2023-05-08 LAB
ALBUMIN SERPL-MCNC: 3.6 G/DL (ref 3.5–5)
ANION GAP SERPL CALC-SCNC: 2 MMOL/L (ref 5–15)
BUN SERPL-MCNC: 14 MG/DL (ref 6–20)
BUN/CREAT SERPL: 15 (ref 12–20)
CALCIUM SERPL-MCNC: 10 MG/DL (ref 8.5–10.1)
CHLORIDE SERPL-SCNC: 109 MMOL/L (ref 97–108)
CO2 SERPL-SCNC: 30 MMOL/L (ref 21–32)
CREAT SERPL-MCNC: 0.94 MG/DL (ref 0.55–1.02)
GLUCOSE SERPL-MCNC: 89 MG/DL (ref 65–100)
PHOSPHATE SERPL-MCNC: 3.6 MG/DL (ref 2.6–4.7)
POTASSIUM SERPL-SCNC: 4.2 MMOL/L (ref 3.5–5.1)
SODIUM SERPL-SCNC: 141 MMOL/L (ref 136–145)

## 2023-05-08 PROCEDURE — 3074F SYST BP LT 130 MM HG: CPT | Performed by: INTERNAL MEDICINE

## 2023-05-08 PROCEDURE — 3078F DIAST BP <80 MM HG: CPT | Performed by: INTERNAL MEDICINE

## 2023-05-08 PROCEDURE — 99213 OFFICE O/P EST LOW 20 MIN: CPT | Performed by: INTERNAL MEDICINE

## 2023-05-08 SDOH — ECONOMIC STABILITY: HOUSING INSECURITY
IN THE LAST 12 MONTHS, WAS THERE A TIME WHEN YOU DID NOT HAVE A STEADY PLACE TO SLEEP OR SLEPT IN A SHELTER (INCLUDING NOW)?: NO

## 2023-05-08 SDOH — ECONOMIC STABILITY: FOOD INSECURITY: WITHIN THE PAST 12 MONTHS, THE FOOD YOU BOUGHT JUST DIDN'T LAST AND YOU DIDN'T HAVE MONEY TO GET MORE.: NEVER TRUE

## 2023-05-08 SDOH — ECONOMIC STABILITY: FOOD INSECURITY: WITHIN THE PAST 12 MONTHS, YOU WORRIED THAT YOUR FOOD WOULD RUN OUT BEFORE YOU GOT MONEY TO BUY MORE.: NEVER TRUE

## 2023-05-08 SDOH — ECONOMIC STABILITY: INCOME INSECURITY: HOW HARD IS IT FOR YOU TO PAY FOR THE VERY BASICS LIKE FOOD, HOUSING, MEDICAL CARE, AND HEATING?: NOT HARD AT ALL

## 2023-05-08 ASSESSMENT — PATIENT HEALTH QUESTIONNAIRE - PHQ9
SUM OF ALL RESPONSES TO PHQ QUESTIONS 1-9: 0
SUM OF ALL RESPONSES TO PHQ QUESTIONS 1-9: 0
1. LITTLE INTEREST OR PLEASURE IN DOING THINGS: 0
SUM OF ALL RESPONSES TO PHQ QUESTIONS 1-9: 0
SUM OF ALL RESPONSES TO PHQ QUESTIONS 1-9: 0
2. FEELING DOWN, DEPRESSED OR HOPELESS: 0
SUM OF ALL RESPONSES TO PHQ9 QUESTIONS 1 & 2: 0

## 2023-05-08 ASSESSMENT — ANXIETY QUESTIONNAIRES
7. FEELING AFRAID AS IF SOMETHING AWFUL MIGHT HAPPEN: 0
4. TROUBLE RELAXING: 0
IF YOU CHECKED OFF ANY PROBLEMS ON THIS QUESTIONNAIRE, HOW DIFFICULT HAVE THESE PROBLEMS MADE IT FOR YOU TO DO YOUR WORK, TAKE CARE OF THINGS AT HOME, OR GET ALONG WITH OTHER PEOPLE: NOT DIFFICULT AT ALL
5. BEING SO RESTLESS THAT IT IS HARD TO SIT STILL: 0
6. BECOMING EASILY ANNOYED OR IRRITABLE: 0
2. NOT BEING ABLE TO STOP OR CONTROL WORRYING: 0
GAD7 TOTAL SCORE: 0
1. FEELING NERVOUS, ANXIOUS, OR ON EDGE: 0
3. WORRYING TOO MUCH ABOUT DIFFERENT THINGS: 0

## 2023-05-08 NOTE — PROGRESS NOTES
Sharron Lambert is a 65 y.o. female    Chief Complaint   Patient presents with    Hypertension     1. Have you been to the ER, urgent care clinic since your last visit? Hospitalized since your last visit? No    2. Have you seen or consulted any other health care providers outside of the [address] [place] [address] since your last visit? Include any pap smears or colon screening.  No
received an after visit summary and questions were answered concerning  future plans  Patient labs and/or xrays were reviewed  Past records were reviewed. PLAN:  .  Orders Placed This Encounter   Procedures    OR COLLECTION VENOUS BLOOD VENIPUNCTURE    AMB POC RENAL FUNCTION PANEL        6 mo           ATTENTION:   This medical record was transcribed using an electronic medical records system. Although proofread, it may and can contain electronic and spelling errors. Other human spelling and other errors may be present. Corrections may be executed at a later time. Please feel free to contact us for any clarifications as needed.

## 2023-08-21 ENCOUNTER — TRANSCRIBE ORDERS (OUTPATIENT)
Facility: HOSPITAL | Age: 63
End: 2023-08-21

## 2023-08-21 DIAGNOSIS — Z12.31 SCREENING MAMMOGRAM FOR HIGH-RISK PATIENT: Primary | ICD-10-CM

## 2023-09-11 ENCOUNTER — HOSPITAL ENCOUNTER (OUTPATIENT)
Facility: HOSPITAL | Age: 63
Discharge: HOME | End: 2023-09-14
Attending: INTERNAL MEDICINE
Payer: COMMERCIAL

## 2023-09-11 VITALS — WEIGHT: 195 LBS | BODY MASS INDEX: 30.61 KG/M2 | HEIGHT: 67 IN

## 2023-09-11 DIAGNOSIS — Z12.31 SCREENING MAMMOGRAM FOR HIGH-RISK PATIENT: ICD-10-CM

## 2023-09-11 PROCEDURE — 77067 SCR MAMMO BI INCL CAD: CPT

## 2023-11-05 RX ORDER — AMLODIPINE BESYLATE 5 MG/1
5 TABLET ORAL DAILY
Qty: 90 TABLET | Refills: 3 | Status: SHIPPED | OUTPATIENT
Start: 2023-11-05

## 2023-11-08 ENCOUNTER — OFFICE VISIT (OUTPATIENT)
Facility: CLINIC | Age: 63
End: 2023-11-08
Payer: COMMERCIAL

## 2023-11-08 VITALS
WEIGHT: 202.3 LBS | OXYGEN SATURATION: 98 % | BODY MASS INDEX: 31.75 KG/M2 | SYSTOLIC BLOOD PRESSURE: 107 MMHG | DIASTOLIC BLOOD PRESSURE: 81 MMHG | RESPIRATION RATE: 20 BRPM | HEIGHT: 67 IN | HEART RATE: 78 BPM | TEMPERATURE: 98 F

## 2023-11-08 DIAGNOSIS — R73.02 IGT (IMPAIRED GLUCOSE TOLERANCE): ICD-10-CM

## 2023-11-08 DIAGNOSIS — E21.0 HYPERPARATHYROIDISM, PRIMARY (HCC): ICD-10-CM

## 2023-11-08 DIAGNOSIS — E66.9 OBESITY (BMI 30.0-34.9): ICD-10-CM

## 2023-11-08 DIAGNOSIS — I10 PRIMARY HYPERTENSION: Primary | ICD-10-CM

## 2023-11-08 DIAGNOSIS — E78.5 DYSLIPIDEMIA: ICD-10-CM

## 2023-11-08 DIAGNOSIS — E04.1 RIGHT THYROID NODULE: ICD-10-CM

## 2023-11-08 DIAGNOSIS — Z63.4 BEREAVEMENT: ICD-10-CM

## 2023-11-08 DIAGNOSIS — M19.90 ARTHRITIS: ICD-10-CM

## 2023-11-08 DIAGNOSIS — Z23 NEED FOR VACCINATION: ICD-10-CM

## 2023-11-08 PROBLEM — Z12.11 ENCOUNTER FOR HEMOCCULT SCREENING: Status: RESOLVED | Noted: 2017-01-24 | Resolved: 2023-11-08

## 2023-11-08 PROBLEM — M17.11 RIGHT KNEE DJD: Status: RESOLVED | Noted: 2022-11-14 | Resolved: 2023-11-08

## 2023-11-08 PROBLEM — M17.12 ARTHRITIS OF KNEE, LEFT: Status: RESOLVED | Noted: 2019-10-07 | Resolved: 2023-11-08

## 2023-11-08 PROCEDURE — 99214 OFFICE O/P EST MOD 30 MIN: CPT | Performed by: INTERNAL MEDICINE

## 2023-11-08 PROCEDURE — 3074F SYST BP LT 130 MM HG: CPT | Performed by: INTERNAL MEDICINE

## 2023-11-08 PROCEDURE — 3079F DIAST BP 80-89 MM HG: CPT | Performed by: INTERNAL MEDICINE

## 2023-11-08 SDOH — SOCIAL STABILITY - SOCIAL INSECURITY: DISSAPEARANCE AND DEATH OF FAMILY MEMBER: Z63.4

## 2023-11-08 ASSESSMENT — PATIENT HEALTH QUESTIONNAIRE - PHQ9
SUM OF ALL RESPONSES TO PHQ QUESTIONS 1-9: 0
SUM OF ALL RESPONSES TO PHQ9 QUESTIONS 1 & 2: 0
2. FEELING DOWN, DEPRESSED OR HOPELESS: 0
SUM OF ALL RESPONSES TO PHQ QUESTIONS 1-9: 0
SUM OF ALL RESPONSES TO PHQ QUESTIONS 1-9: 0
1. LITTLE INTEREST OR PLEASURE IN DOING THINGS: 0
SUM OF ALL RESPONSES TO PHQ QUESTIONS 1-9: 0

## 2023-11-09 DIAGNOSIS — E83.52 HYPERCALCEMIA: Primary | ICD-10-CM

## 2023-11-09 LAB
ALBUMIN SERPL-MCNC: 4.1 G/DL (ref 3.5–5)
ALBUMIN/GLOB SERPL: 1.1 (ref 1.1–2.2)
ALP SERPL-CCNC: 68 U/L (ref 45–117)
ALT SERPL-CCNC: 24 U/L (ref 12–78)
ANION GAP SERPL CALC-SCNC: 4 MMOL/L (ref 5–15)
APPEARANCE UR: CLEAR
AST SERPL-CCNC: 9 U/L (ref 15–37)
BACTERIA URNS QL MICRO: NEGATIVE /HPF
BASOPHILS # BLD: 0 K/UL (ref 0–0.1)
BASOPHILS NFR BLD: 1 % (ref 0–1)
BILIRUB SERPL-MCNC: 0.8 MG/DL (ref 0.2–1)
BILIRUB UR QL: NEGATIVE
BUN SERPL-MCNC: 25 MG/DL (ref 6–20)
BUN/CREAT SERPL: 27 (ref 12–20)
CALCIUM SERPL-MCNC: 10.5 MG/DL (ref 8.5–10.1)
CALCIUM SERPL-MCNC: 11 MG/DL (ref 8.5–10.1)
CHLORIDE SERPL-SCNC: 106 MMOL/L (ref 97–108)
CHOLEST SERPL-MCNC: 142 MG/DL
CO2 SERPL-SCNC: 30 MMOL/L (ref 21–32)
COLOR UR: NORMAL
CREAT SERPL-MCNC: 0.91 MG/DL (ref 0.55–1.02)
DIFFERENTIAL METHOD BLD: ABNORMAL
EOSINOPHIL # BLD: 0.1 K/UL (ref 0–0.4)
EOSINOPHIL NFR BLD: 3 % (ref 0–7)
EPITH CASTS URNS QL MICRO: NORMAL /LPF
ERYTHROCYTE [DISTWIDTH] IN BLOOD BY AUTOMATED COUNT: 15 % (ref 11.5–14.5)
EST. AVERAGE GLUCOSE BLD GHB EST-MCNC: 114 MG/DL
GLOBULIN SER CALC-MCNC: 3.9 G/DL (ref 2–4)
GLUCOSE SERPL-MCNC: 97 MG/DL (ref 65–100)
GLUCOSE UR STRIP.AUTO-MCNC: NEGATIVE MG/DL
HBA1C MFR BLD: 5.6 % (ref 4–5.6)
HBV SURFACE AB SER QL: NONREACTIVE
HBV SURFACE AB SER-ACNC: 8.57 MIU/ML
HBV SURFACE AG SER QL: <0.1 INDEX
HBV SURFACE AG SER QL: NEGATIVE
HCT VFR BLD AUTO: 43.3 % (ref 35–47)
HCV AB SER IA-ACNC: 0.08 INDEX
HCV AB SERPL QL IA: NONREACTIVE
HDLC SERPL-MCNC: 65 MG/DL
HDLC SERPL: 2.2 (ref 0–5)
HGB BLD-MCNC: 14.2 G/DL (ref 11.5–16)
HGB UR QL STRIP: NEGATIVE
HIV 1+2 AB+HIV1 P24 AG SERPL QL IA: NONREACTIVE
HIV 1/2 RESULT COMMENT: NORMAL
HYALINE CASTS URNS QL MICRO: NORMAL /LPF (ref 0–5)
IMM GRANULOCYTES # BLD AUTO: 0 K/UL (ref 0–0.04)
IMM GRANULOCYTES NFR BLD AUTO: 0 % (ref 0–0.5)
KETONES UR QL STRIP.AUTO: NEGATIVE MG/DL
LDLC SERPL CALC-MCNC: 60.8 MG/DL (ref 0–100)
LEUKOCYTE ESTERASE UR QL STRIP.AUTO: NEGATIVE
LYMPHOCYTES # BLD: 1.3 K/UL (ref 0.8–3.5)
LYMPHOCYTES NFR BLD: 32 % (ref 12–49)
MCH RBC QN AUTO: 25.7 PG (ref 26–34)
MCHC RBC AUTO-ENTMCNC: 32.8 G/DL (ref 30–36.5)
MCV RBC AUTO: 78.3 FL (ref 80–99)
MONOCYTES # BLD: 0.4 K/UL (ref 0–1)
MONOCYTES NFR BLD: 9 % (ref 5–13)
NEUTS SEG # BLD: 2.3 K/UL (ref 1.8–8)
NEUTS SEG NFR BLD: 55 % (ref 32–75)
NITRITE UR QL STRIP.AUTO: NEGATIVE
NRBC # BLD: 0 K/UL (ref 0–0.01)
NRBC BLD-RTO: 0 PER 100 WBC
PH UR STRIP: 5.5 (ref 5–8)
PLATELET # BLD AUTO: 322 K/UL (ref 150–400)
PMV BLD AUTO: 10.5 FL (ref 8.9–12.9)
POTASSIUM SERPL-SCNC: 3.8 MMOL/L (ref 3.5–5.1)
PROT SERPL-MCNC: 8 G/DL (ref 6.4–8.2)
PROT UR STRIP-MCNC: NEGATIVE MG/DL
PTH-INTACT SERPL-MCNC: 71.3 PG/ML (ref 18.4–88)
RBC # BLD AUTO: 5.53 M/UL (ref 3.8–5.2)
RBC #/AREA URNS HPF: NORMAL /HPF (ref 0–5)
SODIUM SERPL-SCNC: 140 MMOL/L (ref 136–145)
SP GR UR REFRACTOMETRY: 1.01 (ref 1–1.03)
TRIGL SERPL-MCNC: 81 MG/DL
TSH SERPL DL<=0.05 MIU/L-ACNC: 1.42 UIU/ML (ref 0.36–3.74)
UROBILINOGEN UR QL STRIP.AUTO: 0.2 EU/DL (ref 0.2–1)
VLDLC SERPL CALC-MCNC: 16.2 MG/DL
WBC # BLD AUTO: 4.1 K/UL (ref 3.6–11)
WBC URNS QL MICRO: NORMAL /HPF (ref 0–4)

## 2023-11-09 PROCEDURE — 36415 COLL VENOUS BLD VENIPUNCTURE: CPT | Performed by: INTERNAL MEDICINE

## 2023-11-10 ENCOUNTER — TELEPHONE (OUTPATIENT)
Facility: CLINIC | Age: 63
End: 2023-11-10

## 2023-11-10 LAB — HBV CORE AB SERPL QL IA: NEGATIVE

## 2023-11-10 NOTE — TELEPHONE ENCOUNTER
Patient notified by phone and ask if carissa park is taking any calcium supplements she states no we ask her to return on dec. 7th for additional labs that dr. Constance Francois has ordered

## 2023-11-10 NOTE — TELEPHONE ENCOUNTER
----- Message from Nancy Adair MD sent at 11/9/2023  6:11 PM EST -----  f you are taking calcium supplements please discontinue them.   Please call for nurse visit and return the office in about a month for repeat BMP, ionized calcium, and gammopathy panel

## 2023-11-17 ENCOUNTER — HOSPITAL ENCOUNTER (OUTPATIENT)
Facility: HOSPITAL | Age: 63
Discharge: HOME | End: 2023-11-17
Attending: INTERNAL MEDICINE
Payer: COMMERCIAL

## 2023-11-17 DIAGNOSIS — E04.1 RIGHT THYROID NODULE: ICD-10-CM

## 2023-11-17 PROCEDURE — 76536 US EXAM OF HEAD AND NECK: CPT

## 2023-11-20 PROBLEM — E04.1 THYROID NODULE: Status: ACTIVE | Noted: 2023-11-20

## 2023-12-13 RX ORDER — HYDROCHLOROTHIAZIDE 12.5 MG/1
12.5 TABLET ORAL DAILY
Qty: 90 TABLET | Refills: 3 | Status: SHIPPED | OUTPATIENT
Start: 2023-12-13

## 2024-01-03 ENCOUNTER — NURSE ONLY (OUTPATIENT)
Facility: CLINIC | Age: 64
End: 2024-01-03

## 2024-01-03 DIAGNOSIS — E83.52 HYPERCALCEMIA: ICD-10-CM

## 2024-01-03 LAB
ANION GAP SERPL CALC-SCNC: 3 MMOL/L (ref 5–15)
BUN SERPL-MCNC: 15 MG/DL (ref 6–20)
BUN/CREAT SERPL: 19 (ref 12–20)
CALCIUM SERPL-MCNC: 9.5 MG/DL (ref 8.5–10.1)
CHLORIDE SERPL-SCNC: 108 MMOL/L (ref 97–108)
CO2 SERPL-SCNC: 31 MMOL/L (ref 21–32)
CREAT SERPL-MCNC: 0.78 MG/DL (ref 0.55–1.02)
GLUCOSE SERPL-MCNC: 93 MG/DL (ref 65–100)
POTASSIUM SERPL-SCNC: 3.8 MMOL/L (ref 3.5–5.1)
SODIUM SERPL-SCNC: 142 MMOL/L (ref 136–145)

## 2024-01-04 LAB — CA-I SERPL ISE-MCNC: 5.3 MG/DL (ref 4.5–5.6)

## 2024-01-08 LAB
ALBUMIN SERPL ELPH-MCNC: 3.6 G/DL (ref 2.9–4.4)
ALBUMIN/GLOB SERPL: 1.2 (ref 0.7–1.7)
ALPHA1 GLOB SERPL ELPH-MCNC: 0.2 G/DL (ref 0–0.4)
ALPHA2 GLOB SERPL ELPH-MCNC: 0.7 G/DL (ref 0.4–1)
B-GLOBULIN SERPL ELPH-MCNC: 1 G/DL (ref 0.7–1.3)
GAMMA GLOB SERPL ELPH-MCNC: 1.4 G/DL (ref 0.4–1.8)
GLOBULIN SER-MCNC: 3.2 G/DL (ref 2.2–3.9)
IGA SERPL-MCNC: 365 MG/DL (ref 87–352)
IGG SERPL-MCNC: 1377 MG/DL (ref 586–1602)
IGM SERPL-MCNC: 225 MG/DL (ref 26–217)
INTERPRETATION SERPL IEP-IMP: ABNORMAL
KAPPA LC FREE SER-MCNC: 25.6 MG/L (ref 3.3–19.4)
KAPPA LC FREE/LAMBDA FREE SER: 1.71 (ref 0.26–1.65)
LAMBDA LC FREE SERPL-MCNC: 15 MG/L (ref 5.7–26.3)
M PROTEIN SERPL ELPH-MCNC: ABNORMAL G/DL
PROT SERPL-MCNC: 6.8 G/DL (ref 6–8.5)

## 2024-03-18 NOTE — PROGRESS NOTES
----- Message from Mikey Hernandez MD sent at 3/18/2024  1:21 PM CDT -----  Call and make appt this week or next for follow-up and I heard that he has not been feeling well. Fit him in whenever     SPORTS MEDICINE AND PRIMARY CARE  Wilbert Arriaza MD, [address] [address]  Phone:  [phone]  Fax: [phone]       Chief Complaint   Patient presents with    Hypertension   . SUBJECTIVE:    Venice Coleman is a 62 y.o. female Patient returns today with a history of primary hypertension, impaired glucose tolerance, right thyroid nodule, arthritis, obesity, and is seen for evaluation. She states she is thinking about having surgery on the right knee because she is starting to limp and it bothers her a lot. Patient denies other new complaints and is seen for evaluation. She is working as a CNA at [employer]. Patient is seen for evaluation. Current Outpatient Medications   Medication Sig Dispense Refill    nystatin-triamcinolone (MYCOLOG) 100,000-0.1 unit/gram-% ointment Apply  to affected area two (2) times a day. 30 g 11    amLODIPine (NORVASC) 10 mg tablet TAKE 1/2 TABLET BY MOUTH EVERY DAY 45 Tablet 7    terconazole (TERAZOL 7) 0.4 % vaginal cream Insert 1 Applicator into vagina nightly. 45 g 0    hydroCHLOROthiazide (HYDRODIURIL) 12.5 mg tablet TAKE 1 TAB BY MOUTH DAILY.  80 Tab 3     Past Medical History:   Diagnosis Date    Arthritis     Encounter for Hemoccult screening 01/24/2017    neg    Hot flash, menopausal 2015    Hyperparathyroidism, primary ([address] 02/24/2021    Hypertension 2000    Menopause     Obesity 2000    Pelvic mass in female     Prediabetes 12/29/17    Right thyroid nodule 01/17/2018    Atypia of undetermined significance - pt states dr. Ana Rivera said no further eval    [place] 11[place]    S/P TKR (total knee replacement), left 10/07/2019    Deric Guzman MD     Past Surgical History:   Procedure Laterality Date    HX KNEE ARTHROSCOPY Right 2013    HX KNEE ARTHROSCOPY Left 2015    HX KNEE REPLACEMENT Left     HX TUBAL LIGATION       No Known Allergies      REVIEW OF SYSTEMS:  General: negative for - chills or fever  ENT: negative for - headaches, nasal congestion or tinnitus  Respiratory: negative for - cough, hemoptysis, shortness of breath or wheezing  Cardiovascular : negative for - chest pain, edema, palpitations or shortness of breath  Gastrointestinal: negative for - abdominal pain, blood in stools, heartburn or nausea/vomiting  Genito-Urinary: no dysuria, trouble voiding, or hematuria  Musculoskeletal: negative for - gait disturbance, joint pain, joint stiffness or joint swelling  Neurological: no TIA or stroke symptoms  Hematologic: no bruises, no bleeding, no swollen glands  Integument: no lumps, mole changes, nail changes or rash  Endocrine: no malaise/lethargy or unexpected weight changes      Social History     Socioeconomic History    Marital status:      Spouse name: Not on file    Number of children: Not on file    Years of education: Not on file    Highest education level: Not on file   Tobacco Use    Smoking status: Never Smoker    Smokeless tobacco: Never Used   Vaping Use    Vaping Use: Never used   Substance and Sexual Activity    Alcohol use: Yes    Drug use: Never    Sexual activity: Yes     Partners: Male   Social History Narrative    Habits: There is no history of smoking, alcohol abuse or drug abuse.           Social History:  The patient is  and lives with her . The patient is the mother of 29 25and 39year-old children and three grandchildren. The patient is gainfully employed as a CNA at [employer] She completed high school. Her Latter-day background is Zoroastrianism.          Family History:  Father  at 79 of a sickness. Mother  of complications of alcoholism. She has eight siblings.      Social Determinants of Health     Financial Resource Strain:     Difficulty of Paying Living Expenses:    Food Insecurity:     Worried About Running Out of Food in the Last Year:     [address] in the Last Year:    Transportation Needs:     Lack of Transportation (Medical):  Lack of Transportation (Non-Medical):    Physical Activity:     Days of Exercise per Week:     Minutes of Exercise per Session:    Stress:     Feeling of Stress :    Social Connections:     Frequency of Communication with Friends and Family:     Frequency of Social Gatherings with Friends and Family:     Attends Latter day Services:     Active Member of Clubs or Organizations:     Attends Club or Organization Meetings:     Marital Status:      Family History   Problem Relation Age of Onset    Hypertension Mother     Diabetes Mother     No Known Problems Father     Diabetes Sister     Hypertension Sister     No Known Problems Brother     Breast Cancer Maternal Aunt     Hypertension Sister     Other Sister         SPINA BIFIDA    No Known Problems Brother     Anesth Problems Neg Hx        OBJECTIVE:    Visit Vitals  /86   Pulse 78   Temp 98.1 °F (36.7 °C) (Oral)   Resp 18   Ht 5' 7\" (1.702 m)   Wt 239 lb 11.2 oz (108.7 kg)   SpO2 98%   BMI 37.54 kg/m²     CONSTITUTIONAL: well , well nourished, appears age appropriate  EYES: perrla, eom intact  ENMT:moist mucous membranes, pharynx clear  NECK: supple. Thyroid normal  RESPIRATORY: Chest: clear bilaterally   CARDIOVASCULAR: Heart: regular rate and rhythm  GASTROINTESTINAL: Abdomen: soft, bowel sounds active  HEMATOLOGIC: no pathological lymph nodes palpated  MUSCULOSKELETAL: Extremities: no edema, pulse 1+   INTEGUMENT: No unusual rashes or suspicious skin lesions noted. Nails appear normal.  NEUROLOGIC: non-focal exam   MENTAL STATUS: alert and oriented, appropriate affect           ASSESSMENT:  1. Essential hypertension    2. Needs flu shot    3. Hyperparathyroidism, primary ([address]    4. IGT (impaired glucose tolerance)    5. Right thyroid nodule    6. Arthritis    7. Severe obesity with body mass index (BMI) of 35.0 to 39.9 with serious comorbidity ([address]    8. Vaginal pruritus      Blood pressure control is excellent and at goal. She is on Amlodipine 10 mg daily and Hydrochlorothiazide 12.5 mg daily. She got her flu shot today. She is up to date with her COVID vaccine. She is due for colorectal screening in [place] and she says she had it five years ago. She had a polyp removed five years ago in [place]. We will refer to GI so that can be accomplished well before she has the knee replacement. I certainly do not want to do it after the knee replacement. She reluctantly agrees. There is a history of primary hyperparathyroidism. Her last PTH in February was normal at 60.4. Calcium was slightly elevated at 10.05. We will check the calcium today. Her gammopathy profile was unremarkable. The right thyroid nodule is unchanged on palpation. She has impaired glucose tolerance and we will confirm it has not converted to diabetes by checking a hemoglobin A1c. The arthritis in her right knee is becoming problematic. She is limping more and is tired of the pain, so she is going to go and have the surgical procedure done. We remind her she has to have a BMI of less than 40 and suggest that she not gain any more weight. She wants a refill of the medications given to her by Dr. Tonya Torres. She will be back to see me in four to six months, sooner if needed. I have discussed the diagnosis with the patient and the intended plan as seen in the  Orders. The patient understands and agees with the plan. The patient has   received an after visit summary and questions were answered concerning  future plans  Patient labs and/or xrays were reviewed  Past records were reviewed.     PLAN:  .  Orders Placed This Encounter    Influenza Virus Vaccine QUAD, PF Syr 6 Months + (Flulaval, Fluzone, Fluarix [id number])    URINALYSIS W/ RFLX MICROSCOPIC    CBC WITH AUTOMATED DIFF    METABOLIC PANEL, COMPREHENSIVE    LIPID PANEL    TSH 3RD GENERATION    HEMOGLOBIN A1C WITH EAG    nystatin-triamcinolone (MYCOLOG) 100,000-0.1 unit/gram-% ointment       Follow-up and Dispositions    · Return in about 6 months (around 3/28/2022). ATTENTION:   This medical record was transcribed using an electronic medical records system. Although proofread, it may and can contain electronic and spelling errors. Other human spelling and other errors may be present. Corrections may be executed at a later time. Please feel free to contact us for any clarifications as needed.

## 2024-05-08 ENCOUNTER — OFFICE VISIT (OUTPATIENT)
Facility: CLINIC | Age: 64
End: 2024-05-08
Payer: COMMERCIAL

## 2024-05-08 VITALS
SYSTOLIC BLOOD PRESSURE: 118 MMHG | BODY MASS INDEX: 34.12 KG/M2 | WEIGHT: 217.4 LBS | DIASTOLIC BLOOD PRESSURE: 80 MMHG | HEART RATE: 66 BPM | RESPIRATION RATE: 18 BRPM | HEIGHT: 67 IN | TEMPERATURE: 97.6 F | OXYGEN SATURATION: 96 %

## 2024-05-08 DIAGNOSIS — R73.02 IGT (IMPAIRED GLUCOSE TOLERANCE): ICD-10-CM

## 2024-05-08 DIAGNOSIS — E66.9 OBESITY (BMI 30.0-34.9): ICD-10-CM

## 2024-05-08 DIAGNOSIS — Z98.890 S/P COLONOSCOPIC POLYPECTOMY: ICD-10-CM

## 2024-05-08 DIAGNOSIS — I10 PRIMARY HYPERTENSION: Primary | ICD-10-CM

## 2024-05-08 DIAGNOSIS — M17.11 PRIMARY OSTEOARTHRITIS OF RIGHT KNEE: ICD-10-CM

## 2024-05-08 DIAGNOSIS — Z12.11 SCREEN FOR COLON CANCER: ICD-10-CM

## 2024-05-08 PROBLEM — E21.0 HYPERPARATHYROIDISM, PRIMARY (HCC): Status: RESOLVED | Noted: 2021-02-24 | Resolved: 2024-05-08

## 2024-05-08 PROCEDURE — 3079F DIAST BP 80-89 MM HG: CPT | Performed by: INTERNAL MEDICINE

## 2024-05-08 PROCEDURE — 3074F SYST BP LT 130 MM HG: CPT | Performed by: INTERNAL MEDICINE

## 2024-05-08 PROCEDURE — 99214 OFFICE O/P EST MOD 30 MIN: CPT | Performed by: INTERNAL MEDICINE

## 2024-05-08 RX ORDER — MELOXICAM 15 MG/1
15 TABLET ORAL DAILY
COMMUNITY

## 2024-05-08 SDOH — ECONOMIC STABILITY: FOOD INSECURITY: WITHIN THE PAST 12 MONTHS, THE FOOD YOU BOUGHT JUST DIDN'T LAST AND YOU DIDN'T HAVE MONEY TO GET MORE.: NEVER TRUE

## 2024-05-08 SDOH — ECONOMIC STABILITY: INCOME INSECURITY: HOW HARD IS IT FOR YOU TO PAY FOR THE VERY BASICS LIKE FOOD, HOUSING, MEDICAL CARE, AND HEATING?: NOT VERY HARD

## 2024-05-08 SDOH — ECONOMIC STABILITY: FOOD INSECURITY: WITHIN THE PAST 12 MONTHS, YOU WORRIED THAT YOUR FOOD WOULD RUN OUT BEFORE YOU GOT MONEY TO BUY MORE.: NEVER TRUE

## 2024-05-08 ASSESSMENT — PATIENT HEALTH QUESTIONNAIRE - PHQ9
SUM OF ALL RESPONSES TO PHQ QUESTIONS 1-9: 0
SUM OF ALL RESPONSES TO PHQ QUESTIONS 1-9: 0
2. FEELING DOWN, DEPRESSED OR HOPELESS: NOT AT ALL
SUM OF ALL RESPONSES TO PHQ QUESTIONS 1-9: 0
SUM OF ALL RESPONSES TO PHQ QUESTIONS 1-9: 0
SUM OF ALL RESPONSES TO PHQ9 QUESTIONS 1 & 2: 0
1. LITTLE INTEREST OR PLEASURE IN DOING THINGS: NOT AT ALL

## 2024-05-08 NOTE — PROGRESS NOTES
Chief Complaint   Patient presents with    Hypertension     \"Have you been to the ER, urgent care clinic since your last visit?  Hospitalized since your last visit?\"    YES - When: approximately 4 months ago.  [facility].    “Have you seen or consulted any other health care providers outside of [facility] since your last visit?”    Dr. Sherman orthopedics for bursitis.           Click Here for Release of Records Request  
children and three grandchildren.  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  Her Mormon background is Mu-ism.              Family History:  Father  at 67 of a sickness.  Mother  of complications of alcoholism.  She has eight siblings.     Social Determinants of Health     Financial Resource Strain: Low Risk  (2024)    Overall Financial Resource Strain (CARDIA)     Difficulty of Paying Living Expenses: Not very hard   Food Insecurity: No Food Insecurity (2024)    Hunger Vital Sign     Worried About Running Out of Food in the Last Year: Never true     Ran Out of Food in the Last Year: Never true   Transportation Needs: Unknown (2024)    PRAPARE - Transportation     Lack of Transportation (Non-Medical): No   Housing Stability: Unknown (2024)    Housing Stability Vital Sign     Unstable Housing in the Last Year: No     Family History   Problem Relation Age of Onset    Hypertension Mother     Diabetes Mother     No Known Problems Father     Other Sister         SPINA BIFIDA    Hypertension Sister     Hypertension Sister     Diabetes Sister     No Known Problems Brother     No Known Problems Brother     Breast Cancer Maternal Grandmother     Breast Cancer Maternal Aunt     Anesth Problems Neg Hx        OBJECTIVE:    /80 (Site: Left Upper Arm, Position: Sitting)   Pulse 66   Temp 97.6 °F (36.4 °C) (Oral)   Resp 18   Ht 1.702 m (5' 7\")   Wt 98.6 kg (217 lb 6.4 oz)   SpO2 96%   BMI 34.05 kg/m²   CONSTITUTIONAL: well , well nourished, appears age appropriate  EYES: perrla, eom intact  ENMT:moist mucous membranes, pharynx clear  NECK: supple. Thyroid normal  RESPIRATORY: Chest: clear bilaterally   CARDIOVASCULAR: Heart: regular rate and rhythm  GASTROINTESTINAL: Abdomen: soft, bowel sounds active  HEMATOLOGIC: no pathological lymph nodes palpated  MUSCULOSKELETAL: Extremities: no edema, pulse 1+   INTEGUMENT: No unusual rashes or

## 2024-06-25 ENCOUNTER — ANESTHESIA (OUTPATIENT)
Facility: HOSPITAL | Age: 64
End: 2024-06-25
Payer: COMMERCIAL

## 2024-06-25 ENCOUNTER — HOSPITAL ENCOUNTER (OUTPATIENT)
Facility: HOSPITAL | Age: 64
Setting detail: OUTPATIENT SURGERY
Discharge: HOME | End: 2024-06-25
Attending: INTERNAL MEDICINE | Admitting: INTERNAL MEDICINE
Payer: COMMERCIAL

## 2024-06-25 ENCOUNTER — ANESTHESIA EVENT (OUTPATIENT)
Facility: HOSPITAL | Age: 64
End: 2024-06-25
Payer: COMMERCIAL

## 2024-06-25 VITALS
TEMPERATURE: 97.5 F | HEART RATE: 75 BPM | OXYGEN SATURATION: 99 % | BODY MASS INDEX: 33.59 KG/M2 | HEIGHT: 67 IN | DIASTOLIC BLOOD PRESSURE: 84 MMHG | WEIGHT: 214 LBS | SYSTOLIC BLOOD PRESSURE: 121 MMHG | RESPIRATION RATE: 22 BRPM

## 2024-06-25 PROCEDURE — 3700000000 HC ANESTHESIA ATTENDED CARE: Performed by: INTERNAL MEDICINE

## 2024-06-25 PROCEDURE — 3700000001 HC ADD 15 MINUTES (ANESTHESIA): Performed by: INTERNAL MEDICINE

## 2024-06-25 PROCEDURE — 2580000003 HC RX 258: Performed by: ANESTHESIOLOGY

## 2024-06-25 PROCEDURE — 7100000011 HC PHASE II RECOVERY - ADDTL 15 MIN: Performed by: INTERNAL MEDICINE

## 2024-06-25 PROCEDURE — 7100000000 HC PACU RECOVERY - FIRST 15 MIN: Performed by: INTERNAL MEDICINE

## 2024-06-25 PROCEDURE — 88305 TISSUE EXAM BY PATHOLOGIST: CPT

## 2024-06-25 PROCEDURE — 6360000002 HC RX W HCPCS: Performed by: ANESTHESIOLOGY

## 2024-06-25 PROCEDURE — 2709999900 HC NON-CHARGEABLE SUPPLY: Performed by: INTERNAL MEDICINE

## 2024-06-25 PROCEDURE — 3600000002 HC SURGERY LEVEL 2 BASE: Performed by: INTERNAL MEDICINE

## 2024-06-25 PROCEDURE — 7100000010 HC PHASE II RECOVERY - FIRST 15 MIN: Performed by: INTERNAL MEDICINE

## 2024-06-25 PROCEDURE — 2580000003 HC RX 258: Performed by: INTERNAL MEDICINE

## 2024-06-25 PROCEDURE — 3600000012 HC SURGERY LEVEL 2 ADDTL 15MIN: Performed by: INTERNAL MEDICINE

## 2024-06-25 RX ORDER — SODIUM CHLORIDE 0.9 % (FLUSH) 0.9 %
5-40 SYRINGE (ML) INJECTION EVERY 12 HOURS SCHEDULED
Status: DISCONTINUED | OUTPATIENT
Start: 2024-06-25 | End: 2024-06-25 | Stop reason: HOSPADM

## 2024-06-25 RX ORDER — SODIUM CHLORIDE, SODIUM LACTATE, POTASSIUM CHLORIDE, CALCIUM CHLORIDE 600; 310; 30; 20 MG/100ML; MG/100ML; MG/100ML; MG/100ML
INJECTION, SOLUTION INTRAVENOUS CONTINUOUS
Status: CANCELLED | OUTPATIENT
Start: 2024-06-25

## 2024-06-25 RX ORDER — 0.9 % SODIUM CHLORIDE 0.9 %
INTRAVENOUS SOLUTION INTRAVENOUS PRN
Status: DISCONTINUED | OUTPATIENT
Start: 2024-06-25 | End: 2024-06-25 | Stop reason: SDUPTHER

## 2024-06-25 RX ORDER — PROPOFOL 10 MG/ML
INJECTION, EMULSION INTRAVENOUS CONTINUOUS PRN
Status: DISCONTINUED | OUTPATIENT
Start: 2024-06-25 | End: 2024-06-25 | Stop reason: SDUPTHER

## 2024-06-25 RX ORDER — SODIUM CHLORIDE 0.9 % (FLUSH) 0.9 %
5-40 SYRINGE (ML) INJECTION PRN
Status: CANCELLED | OUTPATIENT
Start: 2024-06-25

## 2024-06-25 RX ORDER — SODIUM CHLORIDE 9 MG/ML
INJECTION, SOLUTION INTRAVENOUS PRN
Status: DISCONTINUED | OUTPATIENT
Start: 2024-06-25 | End: 2024-06-25 | Stop reason: HOSPADM

## 2024-06-25 RX ORDER — SODIUM CHLORIDE 0.9 % (FLUSH) 0.9 %
5-40 SYRINGE (ML) INJECTION EVERY 12 HOURS SCHEDULED
Status: CANCELLED | OUTPATIENT
Start: 2024-06-25

## 2024-06-25 RX ORDER — SODIUM CHLORIDE 0.9 % (FLUSH) 0.9 %
5-40 SYRINGE (ML) INJECTION PRN
Status: DISCONTINUED | OUTPATIENT
Start: 2024-06-25 | End: 2024-06-25 | Stop reason: HOSPADM

## 2024-06-25 RX ORDER — SODIUM CHLORIDE 9 MG/ML
INJECTION, SOLUTION INTRAVENOUS PRN
Status: CANCELLED | OUTPATIENT
Start: 2024-06-25

## 2024-06-25 RX ORDER — SODIUM CHLORIDE 9 MG/ML
25 INJECTION, SOLUTION INTRAVENOUS PRN
Status: DISCONTINUED | OUTPATIENT
Start: 2024-06-25 | End: 2024-06-25 | Stop reason: HOSPADM

## 2024-06-25 RX ADMIN — PROPOFOL 125 MCG/KG/MIN: 10 INJECTION, EMULSION INTRAVENOUS at 11:32

## 2024-06-25 RX ADMIN — PROPOFOL 50 MG: 10 INJECTION, EMULSION INTRAVENOUS at 11:38

## 2024-06-25 RX ADMIN — SODIUM CHLORIDE 25 ML: 9 INJECTION, SOLUTION INTRAVENOUS at 10:13

## 2024-06-25 RX ADMIN — SODIUM CHLORIDE 971 ML: 900 INJECTION, SOLUTION INTRAVENOUS at 11:33

## 2024-06-25 RX ADMIN — PROPOFOL 100 MG: 10 INJECTION, EMULSION INTRAVENOUS at 11:33

## 2024-06-25 ASSESSMENT — PAIN - FUNCTIONAL ASSESSMENT
PAIN_FUNCTIONAL_ASSESSMENT: 0-10

## 2024-06-25 NOTE — OP NOTE
G I Procedure Note            COLONOSCOPY   Dr. Britton Cobb   [place] office     Elise Powell                                   [id number]                                  [id number]   1960                                      64 y.o.                                    female      Procedure Date: @date@   [x]  Anesthesia MAC                                                                                                Pre Op Diagnosis:    Indications:                   1. Colon cancer screening [Z12.11]                                                                                                                                                                          Post Op Diagnosis:                    1.   5 mm polyp removed                                                           .diverticulosis /internal hemorrhoids                     H&p completed: Yes      Anesthesia Assessment: Performed prior to procedure:      No change  Anesthesia Plan: Performed prior to procedure:                   No change       Medications: See Reviewed List and Reconcilation           Informed consent was obtained     Risk Statement:  Prior to the procedure the risks were explained to the patient and/or to the family including but not limited to perforation, bleeding, adverse drug reaction, aspiration, and even the need for possible surgery.  A colonoscopy exam is not 100% accurate which may be related to preparation or blind spots during the exam.The possibility that an abnormality and /or cancer could be missed was also discussed as well as alternative x-ray options.         Instrument:    Olympus adult Videocolonoscope                                   Immediate Procedure Reassessment Completed     With the patient in the left lateral position, a rectal examination was performed and the findings were: negative without mass, lesions or

## 2024-06-25 NOTE — PERIOP NOTE
Elise Powell  1960  [id number]    Situation:  Verbal report given from: Dr. Wynn and Joslyn Carvalho RN  Procedure: Procedure(s):  COLONOSCOPY, COLON BIOPSY    Background:    Preoperative diagnosis: Colon cancer screening [Z12.11]    Postoperative diagnosis: * No post-op diagnosis entered *    :  Dr. Britton Cobb    Assistant(s): Circulator: Joslyn Carvalho RN  Scrub Person First: Nahomi Burton    Specimens:   ID Type Source Tests Collected by Time Destination   1 : Descending Colon Polyp Bx Tissue Colon-Descending SURGICAL PATHOLOGY Britton Cobb MD 6/25/2024 1144        Assessment:  Intra-procedure medications         Anesthesia gave intra-procedure sedation and medications, see anesthesia flow sheet     Intravenous fluids: LR@ KVO     Vital signs stable

## 2024-06-25 NOTE — DISCHARGE INSTRUCTIONS
Endoscopy Discharge Instructions     Dr. Britton Cobb     [place] office                                            NAME: Elise Powell RECORD NUMBER:[id number]    AGE:  64 y.o. YOB: 1960                                                              FINAL Discharge Procedure and Diagnosis:       Procedure(s):  COLONOSCOPY, COLON BIOPSY       FINDINGS:     Colon polyp removed   Diverticulosis   hemorrhoids                                        MEDICATIONS    [x] CONTINUE CURRENT MEDICATIONS     [] NEW MEDICATIONS           1.    2.    3.         Testing   Schedule              Colonoscopy Screening                                   Recommendations       []  Repeat colonoscopy in 6-12 month         2nd to Inadequate  prep    []  Repeat colonoscopy in 3 years    [x]  Repeat colonoscopy in 5 years    []  Repeat colonoscopy in 10 years         New additional  Tests  Call the office   [phone]) for the appointment time      []      []      []                                     YOUR NEXT APPOINTMENT WITH DR COBB:                                                                                                                                [x]   None follow up with pcp   []  1 week       []   2 week    []  1 month    Always keep KEEP  APPOINTMENT WITH  @PCP@ for regular medical follow up                                                                                                                         If you had a colonoscopy the \"C\" indicates specific instructions        x                                           Diet Instructions :   Ordinarily you may resume your previous diet but your initial diet should be       Light your discharge nurse will go over this with you.  Large meals can cause  abdominal discomfort after these procedures.

## 2024-06-25 NOTE — PERIOP NOTE
Patient meets discharge criteria.  Patient provided with verbal and written discharge instructions.  Patient discharged by wheelchair with friend to transport home.

## 2024-06-25 NOTE — ANESTHESIA POSTPROCEDURE EVALUATION
Department of Anesthesiology  Postprocedure Note    Patient: Elise Powell  MRN: [id number]  YOB: 1960  Date of evaluation: 6/25/2024    Procedure Summary       Date: 06/25/24 Room / Location: [facility] MAIN OR  / [facility] MAIN OR    Anesthesia Start: 1127 Anesthesia Stop: 1150    Procedure: COLONOSCOPY, COLON BIOPSY (Lower GI Region) Diagnosis:       Colon cancer screening      (Colon cancer screening [Z12.11])    Surgeons: Britton Cobb MD Responsible Provider: Hugo Wynn MD    Anesthesia Type: MAC ASA Status: 2            Anesthesia Type: No value filed.    Yann Phase I: Yann Score: 9    Yann Phase II:      Anesthesia Post Evaluation    Patient location during evaluation: PACU  Patient participation: complete - patient participated  Level of consciousness: awake and alert  Pain score: 0  Airway patency: patent  Nausea & Vomiting: no vomiting and no nausea  Cardiovascular status: blood pressure returned to baseline  Respiratory status: airway suctioned  Hydration status: stable  Pain management: satisfactory to patient    No notable events documented.

## 2024-06-25 NOTE — H&P
G I Procedure Note           Endoscopy History and Physical           Dr. Britton Cobb      [place] Office                Elise Powell [id number]  [id number]    1960  64 y.o.  female      Date of Procedure:   Preoperative Diagnosis:       Procedure:   [unfilled]      Colon cancer screening [Z12.11]                         Procedure(s):  COLONOSCOPY      Gastroenterologist:  Anesthesia:           Britton Cobb MD                               Monitor Anesthesia Care            History and procedure indication:  Elise Powell is a 64 y.o. Black /  female who presents with: Colon cancer screening [Z12.11]   including the additional history of Screening ,Screening for colon cancer,,        Past Medical History:   Diagnosis Date    Arthritis     Bereavement 2023      -heart disease    Encounter for Hemoccult screening 2017    neg    Hot flash, menopausal     Hyperparathyroidism, primary (HCC) 2021    Hypertension     Menopause     Obesity     Pelvic mass in female     Prediabetes 2017    Preop examination 2023    Right knee DJD 2022    Right thyroid nodule 2018    Atypia of undetermined significance - pt states dr. wright said no further eval    S/P colonoscopic polypectomy 2021    Britton Cobb MD tubular adenoma,tubulovillious 3 yrs    S/[place] colon[place]    [place]    S/P TKR (total knee replacement), left 10/07/2019    Sagar Rutherford MD    Thyroid nodule 2023    Solid/cystic right lobe thyroid nodule measuring up to 3 cm, TR 2,biopsy is not required.      Prior to Admission medications    Medication Sig Start Date End Date Taking? Authorizing Provider   meloxicam (MOBIC) 15 MG tablet Take 1 tablet by mouth daily    Provider, MD Diana   hydroCHLOROthiazide (HYDRODIURIL) 12.5 MG tablet TAKE 1 TABLET BY MOUTH EVERY

## 2024-06-25 NOTE — ANESTHESIA PRE PROCEDURE
Department of Anesthesiology  Preprocedure Note       Name:  Elise Powell   Age:  64 y.o.  :  1960                                          MRN:  [id number]         Date:  2024      Surgeon: Surgeon(s):  Britton Cobb MD    Procedure: Procedure(s):  COLONOSCOPY    Medications prior to admission:   Prior to Admission medications    Medication Sig Start Date End Date Taking? Authorizing Provider   meloxicam (MOBIC) 15 MG tablet Take 1 tablet by mouth daily    Provider, MD Diana   hydroCHLOROthiazide (HYDRODIURIL) 12.5 MG tablet TAKE 1 TABLET BY MOUTH EVERY DAY 23   Jack Jean MD   amLODIPine (NORVASC) 5 MG tablet TAKE 1 TABLET BY MOUTH EVERY DAY 23   Jack Jean MD       Current medications:    Current Facility-Administered Medications   Medication Dose Route Frequency Provider Last Rate Last Admin   • sodium chloride flush 0.9 % injection 5-40 mL  5-40 mL IntraVENous 2 times per day Britton Cobb MD       • sodium chloride flush 0.9 % injection 5-40 mL  5-40 mL IntraVENous PRN Britton Cobb MD       • 0.9 % sodium chloride infusion  25 mL IntraVENous PRN Britton Cobb MD       • sodium chloride flush 0.9 % injection 5-40 mL  5-40 mL IntraVENous 2 times per day Hugo Wynn MD       • sodium chloride flush 0.9 % injection 5-40 mL  5-40 mL IntraVENous PRN Hugo Wynn MD       • 0.9 % sodium chloride infusion   IntraVENous PRN Hugo Wynn MD       • sodium chloride flush 0.9 % injection 5-40 mL  5-40 mL IntraVENous 2 times per day Hugo Wynn MD       • sodium chloride flush 0.9 % injection 5-40 mL  5-40 mL IntraVENous PRN Hugo Wynn MD       • 0.9 % sodium chloride infusion   IntraVENous PRN Hugo Wynn MD           Allergies:  No Known Allergies    Problem List:    Patient Active Problem List   Diagnosis Code   • Arthritis M19.90   • IGT (impaired glucose tolerance) R73.02

## 2024-09-02 RX ORDER — HYDROCHLOROTHIAZIDE 12.5 MG/1
12.5 TABLET ORAL DAILY
Qty: 90 TABLET | Refills: 3 | Status: SHIPPED | OUTPATIENT
Start: 2024-09-02

## 2024-09-06 ENCOUNTER — TRANSCRIBE ORDERS (OUTPATIENT)
Facility: HOSPITAL | Age: 64
End: 2024-09-06

## 2024-09-06 DIAGNOSIS — Z12.31 VISIT FOR SCREENING MAMMOGRAM: Primary | ICD-10-CM

## 2024-09-13 ENCOUNTER — HOSPITAL ENCOUNTER (OUTPATIENT)
Facility: HOSPITAL | Age: 64
Discharge: HOME | End: 2024-09-16
Attending: INTERNAL MEDICINE
Payer: COMMERCIAL

## 2024-09-13 VITALS — HEIGHT: 67 IN | WEIGHT: 214 LBS | BODY MASS INDEX: 33.59 KG/M2

## 2024-09-13 DIAGNOSIS — Z12.31 VISIT FOR SCREENING MAMMOGRAM: ICD-10-CM

## 2024-09-13 PROCEDURE — 77063 BREAST TOMOSYNTHESIS BI: CPT

## 2024-10-18 RX ORDER — AMLODIPINE BESYLATE 5 MG/1
5 TABLET ORAL DAILY
Qty: 90 TABLET | Refills: 3 | Status: SHIPPED | OUTPATIENT
Start: 2024-10-18

## 2024-10-30 ENCOUNTER — OFFICE VISIT (OUTPATIENT)
Facility: CLINIC | Age: 64
End: 2024-10-30

## 2024-10-30 VITALS
SYSTOLIC BLOOD PRESSURE: 131 MMHG | BODY MASS INDEX: 34.61 KG/M2 | RESPIRATION RATE: 16 BRPM | HEART RATE: 81 BPM | DIASTOLIC BLOOD PRESSURE: 84 MMHG | HEIGHT: 67 IN | OXYGEN SATURATION: 99 % | WEIGHT: 220.5 LBS | TEMPERATURE: 97.9 F

## 2024-10-30 DIAGNOSIS — Z86.0100 H/O COLONOSCOPY WITH POLYPECTOMY: ICD-10-CM

## 2024-10-30 DIAGNOSIS — I10 PRIMARY HYPERTENSION: Primary | ICD-10-CM

## 2024-10-30 DIAGNOSIS — M19.90 ARTHRITIS: ICD-10-CM

## 2024-10-30 DIAGNOSIS — Z23 NEED FOR VACCINATION: ICD-10-CM

## 2024-10-30 DIAGNOSIS — Z98.890 H/O COLONOSCOPY WITH POLYPECTOMY: ICD-10-CM

## 2024-10-30 DIAGNOSIS — E66.811 OBESITY (BMI 30.0-34.9): ICD-10-CM

## 2024-10-30 DIAGNOSIS — E04.1 RIGHT THYROID NODULE: ICD-10-CM

## 2024-10-30 DIAGNOSIS — E78.5 DYSLIPIDEMIA: ICD-10-CM

## 2024-10-30 DIAGNOSIS — R73.02 IGT (IMPAIRED GLUCOSE TOLERANCE): ICD-10-CM

## 2024-10-30 RX ORDER — HYDROCHLOROTHIAZIDE 12.5 MG/1
12.5 TABLET ORAL DAILY
Qty: 90 TABLET | Refills: 3 | Status: CANCELLED | OUTPATIENT
Start: 2024-10-30

## 2024-10-30 RX ORDER — HYDROCHLOROTHIAZIDE 12.5 MG/1
12.5 TABLET ORAL DAILY
Qty: 90 TABLET | Refills: 3 | Status: SHIPPED | OUTPATIENT
Start: 2024-10-30

## 2024-10-30 RX ORDER — AMLODIPINE BESYLATE 5 MG/1
5 TABLET ORAL DAILY
Qty: 90 TABLET | Refills: 3 | Status: CANCELLED | OUTPATIENT
Start: 2024-10-30

## 2024-10-30 RX ORDER — AMLODIPINE BESYLATE 5 MG/1
5 TABLET ORAL DAILY
Qty: 90 TABLET | Refills: 3 | Status: SHIPPED | OUTPATIENT
Start: 2024-10-30

## 2024-10-30 NOTE — PROGRESS NOTES
SPORTS MEDICINE AND PRIMARY CARE  Jack Jean MD, FACP, CMD  [address] [facility] [address]  Phone:  [phone]  Fax: [phone]       Chief Complaint   Patient presents with    Follow-up    Hypertension   .      SUBJECTIVE:       Elise Powell is a 64 y.o. female Patient returns today with a known history of hyperparathyroidism, now resolved, arthritis, impaired glucose tolerance, hypertension, status post left total knee replacement and right thyroid nodule.  She had a colonoscopy by Britton Cobb on 24 with removal of tubular adenoma and recommended repeat colonoscopy in five years.    Patient returns today voicing no new complaints. She states she is \"walking, taking it easy and doing well\".             Current Outpatient Medications   Medication Sig Dispense Refill    amLODIPine (NORVASC) 5 MG tablet Take 1 tablet by mouth daily 90 tablet 3    hydroCHLOROthiazide 12.5 MG tablet Take 1 tablet by mouth daily 90 tablet 3     No current facility-administered medications for this visit.     Past Medical History:   Diagnosis Date    Arthritis     Bereavement 2023      -heart disease    Encounter for Hemoccult screening 2017    neg    H/O colonoscopy with polypectomy 2024    Britton Cobb MD 5 yrs - tibular adenoma    Hot flash, menopausal     Hyperparathyroidism, primary (HCC) 2021    Hypertension 2000    Menopause     Obesity 2000    Pelvic mass in female     Prediabetes 2017    Preop examination 2023    Right knee DJD 2022    Right thyroid nodule 2018    Atypia of undetermined significance - pt states dr. wright said no further eval    S/P colonoscopic polypectomy 2021    Britton Cobb MD tubular adenoma,tubulovillious 3 yrs    S/P colonoscopy 2015    [place]    S/P TKR (total knee replacement), left 10/07/2019    Sagar Rutherford MD    Thyroid nodule 2023    Solid/cystic right lobe

## 2024-10-30 NOTE — PROGRESS NOTES
Chief Complaint   Patient presents with    Follow-up    Hypertension     \"Have you been to the ER, urgent care clinic since your last visit?  Hospitalized since your last visit?\"    NO    “Have you seen or consulted any other health care providers outside our system since your last visit?”    NO

## 2024-10-31 LAB
ALBUMIN SERPL-MCNC: 4 G/DL (ref 3.5–5)
ALBUMIN/GLOB SERPL: 1 (ref 1.1–2.2)
ALP SERPL-CCNC: 67 U/L (ref 45–117)
ALT SERPL-CCNC: 20 U/L (ref 12–78)
ANION GAP SERPL CALC-SCNC: 5 MMOL/L (ref 2–12)
APPEARANCE UR: CLEAR
AST SERPL-CCNC: 12 U/L (ref 15–37)
BACTERIA URNS QL MICRO: NEGATIVE /HPF
BASOPHILS # BLD: 0.1 K/UL (ref 0–0.1)
BASOPHILS NFR BLD: 1 % (ref 0–1)
BILIRUB SERPL-MCNC: 0.7 MG/DL (ref 0.2–1)
BILIRUB UR QL: NEGATIVE
BUN SERPL-MCNC: 14 MG/DL (ref 6–20)
BUN/CREAT SERPL: 15 (ref 12–20)
CALCIUM SERPL-MCNC: 10.4 MG/DL (ref 8.5–10.1)
CHLORIDE SERPL-SCNC: 108 MMOL/L (ref 97–108)
CHOLEST SERPL-MCNC: 177 MG/DL
CO2 SERPL-SCNC: 29 MMOL/L (ref 21–32)
COLOR UR: ABNORMAL
CREAT SERPL-MCNC: 0.94 MG/DL (ref 0.55–1.02)
DIFFERENTIAL METHOD BLD: ABNORMAL
EOSINOPHIL # BLD: 0.2 K/UL (ref 0–0.4)
EOSINOPHIL NFR BLD: 4 % (ref 0–7)
EPITH CASTS URNS QL MICRO: ABNORMAL /LPF
ERYTHROCYTE [DISTWIDTH] IN BLOOD BY AUTOMATED COUNT: 15.4 % (ref 11.5–14.5)
EST. AVERAGE GLUCOSE BLD GHB EST-MCNC: 117 MG/DL
GLOBULIN SER CALC-MCNC: 3.9 G/DL (ref 2–4)
GLUCOSE SERPL-MCNC: 95 MG/DL (ref 65–100)
GLUCOSE UR STRIP.AUTO-MCNC: NEGATIVE MG/DL
HBA1C MFR BLD: 5.7 % (ref 4–5.6)
HCT VFR BLD AUTO: 42.7 % (ref 35–47)
HDLC SERPL-MCNC: 74 MG/DL
HDLC SERPL: 2.4 (ref 0–5)
HGB BLD-MCNC: 14 G/DL (ref 11.5–16)
HGB UR QL STRIP: NEGATIVE
HYALINE CASTS URNS QL MICRO: ABNORMAL /LPF (ref 0–5)
IMM GRANULOCYTES # BLD AUTO: 0 K/UL (ref 0–0.04)
IMM GRANULOCYTES NFR BLD AUTO: 0 % (ref 0–0.5)
KETONES UR QL STRIP.AUTO: NEGATIVE MG/DL
LDLC SERPL CALC-MCNC: 80.6 MG/DL (ref 0–100)
LEUKOCYTE ESTERASE UR QL STRIP.AUTO: ABNORMAL
LYMPHOCYTES # BLD: 1.8 K/UL (ref 0.8–3.5)
LYMPHOCYTES NFR BLD: 36 % (ref 12–49)
MCH RBC QN AUTO: 26.3 PG (ref 26–34)
MCHC RBC AUTO-ENTMCNC: 32.8 G/DL (ref 30–36.5)
MCV RBC AUTO: 80.1 FL (ref 80–99)
MONOCYTES # BLD: 0.4 K/UL (ref 0–1)
MONOCYTES NFR BLD: 9 % (ref 5–13)
NEUTS SEG # BLD: 2.5 K/UL (ref 1.8–8)
NEUTS SEG NFR BLD: 50 % (ref 32–75)
NITRITE UR QL STRIP.AUTO: NEGATIVE
NRBC # BLD: 0 K/UL (ref 0–0.01)
NRBC BLD-RTO: 0 PER 100 WBC
PH UR STRIP: 5 (ref 5–8)
PLATELET # BLD AUTO: 297 K/UL (ref 150–400)
PMV BLD AUTO: 10.5 FL (ref 8.9–12.9)
POTASSIUM SERPL-SCNC: 3.7 MMOL/L (ref 3.5–5.1)
PROT SERPL-MCNC: 7.9 G/DL (ref 6.4–8.2)
PROT UR STRIP-MCNC: NEGATIVE MG/DL
RBC # BLD AUTO: 5.33 M/UL (ref 3.8–5.2)
RBC #/AREA URNS HPF: ABNORMAL /HPF (ref 0–5)
SODIUM SERPL-SCNC: 142 MMOL/L (ref 136–145)
SP GR UR REFRACTOMETRY: 1.01 (ref 1–1.03)
TRIGL SERPL-MCNC: 112 MG/DL
TSH SERPL DL<=0.05 MIU/L-ACNC: 1.59 UIU/ML (ref 0.36–3.74)
UROBILINOGEN UR QL STRIP.AUTO: 0.2 EU/DL (ref 0.2–1)
VLDLC SERPL CALC-MCNC: 22.4 MG/DL
WBC # BLD AUTO: 5 K/UL (ref 3.6–11)
WBC URNS QL MICRO: ABNORMAL /HPF (ref 0–4)

## 2025-04-30 ENCOUNTER — OFFICE VISIT (OUTPATIENT)
Facility: CLINIC | Age: 65
End: 2025-04-30
Payer: COMMERCIAL

## 2025-04-30 VITALS
BODY MASS INDEX: 32.96 KG/M2 | RESPIRATION RATE: 16 BRPM | TEMPERATURE: 98.2 F | HEART RATE: 78 BPM | SYSTOLIC BLOOD PRESSURE: 120 MMHG | DIASTOLIC BLOOD PRESSURE: 80 MMHG | WEIGHT: 210 LBS | OXYGEN SATURATION: 95 % | HEIGHT: 67 IN

## 2025-04-30 DIAGNOSIS — E66.811 OBESITY (BMI 30.0-34.9): ICD-10-CM

## 2025-04-30 DIAGNOSIS — I10 PRIMARY HYPERTENSION: Primary | ICD-10-CM

## 2025-04-30 DIAGNOSIS — E04.1 RIGHT THYROID NODULE: ICD-10-CM

## 2025-04-30 DIAGNOSIS — M89.8X1 PAIN OF LEFT SCAPULA: ICD-10-CM

## 2025-04-30 DIAGNOSIS — M19.90 ARTHRITIS: ICD-10-CM

## 2025-04-30 DIAGNOSIS — M17.11 PRIMARY OSTEOARTHRITIS OF RIGHT KNEE: ICD-10-CM

## 2025-04-30 PROCEDURE — 3079F DIAST BP 80-89 MM HG: CPT | Performed by: INTERNAL MEDICINE

## 2025-04-30 PROCEDURE — 99214 OFFICE O/P EST MOD 30 MIN: CPT | Performed by: INTERNAL MEDICINE

## 2025-04-30 PROCEDURE — 3074F SYST BP LT 130 MM HG: CPT | Performed by: INTERNAL MEDICINE

## 2025-04-30 RX ORDER — HYDROCHLOROTHIAZIDE 12.5 MG/1
12.5 TABLET ORAL DAILY
Qty: 90 TABLET | Refills: 3 | Status: SHIPPED | OUTPATIENT
Start: 2025-04-30

## 2025-04-30 RX ORDER — AMLODIPINE BESYLATE 5 MG/1
5 TABLET ORAL DAILY
Qty: 90 TABLET | Refills: 3 | Status: SHIPPED | OUTPATIENT
Start: 2025-04-30

## 2025-04-30 SDOH — ECONOMIC STABILITY: FOOD INSECURITY: WITHIN THE PAST 12 MONTHS, YOU WORRIED THAT YOUR FOOD WOULD RUN OUT BEFORE YOU GOT MONEY TO BUY MORE.: NEVER TRUE

## 2025-04-30 SDOH — ECONOMIC STABILITY: FOOD INSECURITY: WITHIN THE PAST 12 MONTHS, THE FOOD YOU BOUGHT JUST DIDN'T LAST AND YOU DIDN'T HAVE MONEY TO GET MORE.: NEVER TRUE

## 2025-04-30 ASSESSMENT — PATIENT HEALTH QUESTIONNAIRE - PHQ9
2. FEELING DOWN, DEPRESSED OR HOPELESS: NOT AT ALL
1. LITTLE INTEREST OR PLEASURE IN DOING THINGS: NOT AT ALL
SUM OF ALL RESPONSES TO PHQ QUESTIONS 1-9: 0

## 2025-04-30 NOTE — PROGRESS NOTES
Chief Complaint   Patient presents with    6 Month Follow-Up    Hypertension    Shoulder Pain     Pt states that she is having pain in left shoulder for about 2 wks     Have you been to the ER, urgent care clinic since your last visit?  Hospitalized since your last visit?   NO    Have you seen or consulted any other health care providers outside our system since your last visit?   NO           
  4. Obesity (BMI 30.0-34.9)    5. Primary osteoarthritis of right knee    6. Pain of left scapula        I have discussed the diagnosis with the patient and the intended plan as seen in the  Orders.  The patient understands and agees with the plan.  The patient has   received an after visit summary and questions were answered concerning  future plans  Patient labs and/or xrays were reviewed  Past records were reviewed.    PLAN:  No orders of the defined types were placed in this encounter.       Follow-up and Dispositions    Return in about 6 months (around 10/30/2025).                ATTENTION:   This medical record was transcribed using an electronic medical records system.  Although proofread, it may and can contain electronic and spelling errors.  Other human spelling and other errors may be present.  Corrections may be executed at a later time.  Please feel free to contact us for any clarifications as needed.

## (undated) DEVICE — SYRINGE MED 20ML STD CLR PLAS LUERLOCK TIP N CTRL DISP

## (undated) DEVICE — Device

## (undated) DEVICE — DRAPE,EXTREMITY,89X128,STERILE: Brand: MEDLINE

## (undated) DEVICE — STERILE POLYISOPRENE POWDER-FREE SURGICAL GLOVES WITH EMOLLIENT COATING: Brand: PROTEXIS

## (undated) DEVICE — SUTURE MCRYL SZ 3-0 L27IN ABSRB UD L24MM PS-1 3/8 CIR PRIM Y936H

## (undated) DEVICE — YANKAUER,FLEXIBLE HANDLE,REGLR CAPACITY: Brand: MEDLINE INDUSTRIES, INC.

## (undated) DEVICE — BLADE SAW W083XL354IN THK0047IN CUT THK0047IN SAG FLR

## (undated) DEVICE — STRYKER PERFORMANCE SERIES SAGITTAL BLADE: Brand: STRYKER PERFORMANCE SERIES

## (undated) DEVICE — CANNULA CUSH AD W/ 14FT TBG

## (undated) DEVICE — CONTAINER,SPECIMEN,4OZ,OR STRL: Brand: MEDLINE

## (undated) DEVICE — T4 HOOD

## (undated) DEVICE — ZIMMER® STERILE DISPOSABLE TOURNIQUET CUFF WITH PLC, DUAL PORT, SINGLE BLADDER, 34 IN. (86 CM)

## (undated) DEVICE — SOLIDIFIER FLD 2OZ 1500CC N DISINF IN BTL DISP SAFESORB

## (undated) DEVICE — HANDPIECE SET WITH BONE CLEANING TIP AND SUCTION TUBE: Brand: INTERPULSE

## (undated) DEVICE — NDL HYPO SAFE GLDE 21GX1IN --

## (undated) DEVICE — TRAP SPEC COLL POLYP POLYSTYR --

## (undated) DEVICE — SOLUTION SURG PREP 26 CC PURPREP

## (undated) DEVICE — 4-PORT MANIFOLD: Brand: NEPTUNE 2

## (undated) DEVICE — GLOVE SURG SZ 65 L12IN FNGR THK79MIL GRN LTX FREE

## (undated) DEVICE — SPONGE GZ W4XL4IN COT RADPQ HIGHLY ABSRB

## (undated) DEVICE — SNARE ENDOSCP M L240CM W27MM SHTH DIA2.4MM CHN 2.8MM OVL

## (undated) DEVICE — SOLUTION IRRIG 3000ML 0.9% SOD CHL FLX CONT 0797208] ICU MEDICAL INC]

## (undated) DEVICE — SUTURE VCRL SZ 2 L54IN ABSRB UD L65MM TP-1 1/2 CIR J880T

## (undated) DEVICE — NET RETRV FB ENDOSCP 2.5MMX230 -- ROTH NET

## (undated) DEVICE — ZIPPERED TOGA, 2X LARGE: Brand: FLYTE, SURGICOOL

## (undated) DEVICE — DRAPE SHT 3 QTR PROXIMA 53X77 --

## (undated) DEVICE — SUTURE VCRL SZ 2-0 L36IN ABSRB UD L40MM CT 1/2 CIR J957H

## (undated) DEVICE — PADDING CST 6IN STERILE --

## (undated) DEVICE — SYR LR LCK 1ML GRAD NSAF 30ML --

## (undated) DEVICE — BANDAGE COMPR M W6INXL10YD WHT BGE VELC E MTRX HK AND LOOP

## (undated) DEVICE — ADHESIVE SKIN CLSR 0.7ML TOP DERMBND ADV

## (undated) DEVICE — TUBING, SUCTION, 9/32" X 12', STRAIGHT: Brand: MEDLINE INDUSTRIES, INC.

## (undated) DEVICE — CONTAINER SPEC 15 ML CAP NEUT BUFF FORMALIN 10 % POLYPR

## (undated) DEVICE — TOWEL 4 PLY TISS 19X30 SUE WHT

## (undated) DEVICE — STRAP,POSITIONING,KNEE/BODY,FOAM,4X60": Brand: MEDLINE

## (undated) DEVICE — ZIPPERED TOGA, X-LARGE: Brand: FLYTE, SURGICOOL

## (undated) DEVICE — NEEDLE HYPO 21GA L1.5IN INTRAMUSCULAR S STL LATCH BVL UP

## (undated) DEVICE — SUTURE STRATAFIX SPRL SZ 1 L14IN ABSRB VLT L48CM CTX 1/2 SXPD2B405

## (undated) DEVICE — SYSTEM NAVIGATION PALM SZ PRECIS ALIGN TECHNOLOGY DISP FOR

## (undated) DEVICE — ELECTRODE,RADIOTRANSLUCENT,FOAM,5PK: Brand: MEDLINE

## (undated) DEVICE — TUBING, SUCTION, 1/4" X 12', STRAIGHT: Brand: MEDLINE

## (undated) DEVICE — SUTURE STRATAFIX SYMMETRIC PDS + SZ 1 L18IN ABSRB VLT L48MM SXPP1A400

## (undated) DEVICE — CONTAINER SPEC 20 ML LID NEUT BUFF FORMALIN 10 % POLYPR STS

## (undated) DEVICE — TOTAL JOINT - SMH: Brand: MEDLINE INDUSTRIES, INC.

## (undated) DEVICE — CARTRIDGE BNE CEM MIX UNIV TWR VAC ROTOR BRK OFF NOZ W/O

## (undated) DEVICE — DRESSING HYDROFIBER AQUACEL AG ADVANTAGE 3.5X14 IN

## (undated) DEVICE — SOLUTION IRRIG 3000ML 0.9% SOD CHL USP UROMATIC PLAS CONT

## (undated) DEVICE — SCRUB DRY SURG EZ SCRUB BRUSH PREOPERATIVE GRN

## (undated) DEVICE — FORCEPS BX L240CM JAW DIA2.8MM L CAP W/ NDL MIC MESH TOOTH

## (undated) DEVICE — MARKER,SKIN,WI/RULER AND LABELS: Brand: MEDLINE

## (undated) DEVICE — CONTAINER,SPECIMEN,3OZ,OR STRL: Brand: MEDLINE

## (undated) DEVICE — SYRINGE MED 10ML LUERLOCK TIP W/O SFTY DISP

## (undated) DEVICE — PREP SKN CHLRAPRP APL 26ML STR --

## (undated) DEVICE — (D)PREP SKN CHLRAPRP APPL 26ML -- CONVERT TO ITEM 371833

## (undated) DEVICE — STERILE POLYISOPRENE POWDER-FREE SURGICAL GLOVES: Brand: PROTEXIS

## (undated) DEVICE — REM POLYHESIVE ADULT PATIENT RETURN ELECTRODE: Brand: VALLEYLAB

## (undated) DEVICE — SUTURE VCRL SZ 0 L27IN ABSRB UD L36MM CT-1 1/2 CIR J260H

## (undated) DEVICE — BLADE SAW W0.49XL3.15IN THK0.047IN CUT THK0.047IN REPL SAG

## (undated) DEVICE — BRUSH SCRB DRY NL CLN LF GRN --

## (undated) DEVICE — TIP SUCT CRV REG REDI

## (undated) DEVICE — PADDING CAST SPEC 6INX4YD COT --

## (undated) DEVICE — SYRINGE 20ML LL S/C 50

## (undated) DEVICE — ERASECAUTI INTERMIT TRAY: Brand: MEDLINE INDUSTRIES, INC.

## (undated) DEVICE — INFECTION CONTROL KIT SYS

## (undated) DEVICE — SPONGE GZ W4XL4IN COT 12 PLY TYP VII WVN C FLD DSGN STERILE

## (undated) DEVICE — GLOVE SURG SZ 65 L12IN FNGR THK94MIL STD WHT LTX FREE

## (undated) DEVICE — CUSTOM CAST PD STR

## (undated) DEVICE — HANDLE LT SNAP ON ULT DURABLE LENS FOR TRUMPF ALC DISPOSABLE

## (undated) DEVICE — SUTURE VCRL 1 L27IN ABSRB CT BRAID COAT UD J281H

## (undated) DEVICE — TOTAL TRAY, 16FR 10ML SIL FOLEY, URN: Brand: MEDLINE

## (undated) DEVICE — SOLUTION IRRIG 1000ML H2O STRL BLT

## (undated) DEVICE — BAG SPEC BIOHZRD 10 X 10 IN --

## (undated) DEVICE — KIT,1200CC CANISTER,3/16"X6' TUBING: Brand: MEDLINE INDUSTRIES, INC.